# Patient Record
Sex: FEMALE | Race: WHITE | NOT HISPANIC OR LATINO | Employment: OTHER | URBAN - METROPOLITAN AREA
[De-identification: names, ages, dates, MRNs, and addresses within clinical notes are randomized per-mention and may not be internally consistent; named-entity substitution may affect disease eponyms.]

---

## 2017-05-18 PROBLEM — N20.0 KIDNEY STONES: Status: ACTIVE | Noted: 2017-05-18

## 2017-09-13 ENCOUNTER — OFFICE VISIT (OUTPATIENT)
Dept: INTERNAL MEDICINE | Facility: CLINIC | Age: 62
End: 2017-09-13

## 2017-09-13 VITALS
SYSTOLIC BLOOD PRESSURE: 108 MMHG | DIASTOLIC BLOOD PRESSURE: 64 MMHG | HEIGHT: 66 IN | OXYGEN SATURATION: 97 % | BODY MASS INDEX: 24.08 KG/M2 | TEMPERATURE: 98.1 F | HEART RATE: 63 BPM | WEIGHT: 149.8 LBS

## 2017-09-13 DIAGNOSIS — M79.644 CHRONIC THUMB PAIN, BILATERAL: ICD-10-CM

## 2017-09-13 DIAGNOSIS — Z00.00 ANNUAL PHYSICAL EXAM: Primary | ICD-10-CM

## 2017-09-13 DIAGNOSIS — R73.09 ABNORMAL GLUCOSE: ICD-10-CM

## 2017-09-13 DIAGNOSIS — E61.1 IRON DEFICIENCY: ICD-10-CM

## 2017-09-13 DIAGNOSIS — Z12.11 SCREENING FOR COLON CANCER: ICD-10-CM

## 2017-09-13 DIAGNOSIS — R53.83 OTHER FATIGUE: ICD-10-CM

## 2017-09-13 DIAGNOSIS — E78.00 PURE HYPERCHOLESTEROLEMIA: ICD-10-CM

## 2017-09-13 DIAGNOSIS — G89.29 CHRONIC THUMB PAIN, BILATERAL: ICD-10-CM

## 2017-09-13 DIAGNOSIS — N84.1 ENDOCERVICAL POLYP: ICD-10-CM

## 2017-09-13 DIAGNOSIS — R82.90 ABNORMAL URINALYSIS: ICD-10-CM

## 2017-09-13 DIAGNOSIS — M79.645 CHRONIC THUMB PAIN, BILATERAL: ICD-10-CM

## 2017-09-13 DIAGNOSIS — Z01.411 ENCOUNTER FOR GYNECOLOGICAL EXAMINATION WITH ABNORMAL FINDING: ICD-10-CM

## 2017-09-13 PROBLEM — E78.5 HYPERLIPIDEMIA: Status: ACTIVE | Noted: 2017-09-13

## 2017-09-13 PROBLEM — Z85.3 HISTORY OF CANCER OF LEFT BREAST: Status: ACTIVE | Noted: 2017-09-13

## 2017-09-13 LAB
ALBUMIN SERPL-MCNC: 4.3 G/DL (ref 3.2–4.8)
ALBUMIN/GLOB SERPL: 1.6 G/DL (ref 1.5–2.5)
ALP SERPL-CCNC: 86 U/L (ref 25–100)
ALT SERPL-CCNC: 17 U/L (ref 7–40)
AST SERPL-CCNC: 21 U/L (ref 0–33)
BASOPHILS # BLD AUTO: 0.01 10*3/MM3 (ref 0–0.2)
BASOPHILS NFR BLD AUTO: 0.2 % (ref 0–1)
BILIRUB BLD-MCNC: NEGATIVE MG/DL
BILIRUB SERPL-MCNC: 0.8 MG/DL (ref 0.3–1.2)
BUN SERPL-MCNC: 17 MG/DL (ref 9–23)
BUN/CREAT SERPL: 24.3 (ref 7–25)
CALCIUM SERPL-MCNC: 8.9 MG/DL (ref 8.7–10.4)
CHLORIDE SERPL-SCNC: 103 MMOL/L (ref 99–109)
CHOLEST SERPL-MCNC: 293 MG/DL (ref 0–200)
CLARITY, POC: CLEAR
CO2 SERPL-SCNC: 30 MMOL/L (ref 20–31)
COLOR UR: YELLOW
CREAT SERPL-MCNC: 0.7 MG/DL (ref 0.6–1.3)
DEVELOPER EXPIRATION DATE: NORMAL
DEVELOPER LOT NUMBER: NORMAL
EOSINOPHIL # BLD AUTO: 0.04 10*3/MM3 (ref 0–0.3)
EOSINOPHIL NFR BLD AUTO: 1 % (ref 0–3)
ERYTHROCYTE [DISTWIDTH] IN BLOOD BY AUTOMATED COUNT: 13.2 % (ref 11.3–14.5)
EXPIRATION DATE: NORMAL
FECAL OCCULT BLOOD SCREEN, POC: NEGATIVE
FOLATE SERPL-MCNC: 20.76 NG/ML (ref 3.2–20)
GLOBULIN SER CALC-MCNC: 2.7 GM/DL
GLUCOSE SERPL-MCNC: 92 MG/DL (ref 70–100)
GLUCOSE UR STRIP-MCNC: NEGATIVE MG/DL
HBA1C MFR BLD: 5.3 % (ref 4.8–5.6)
HCT VFR BLD AUTO: 43.1 % (ref 34.5–44)
HDLC SERPL-MCNC: 90 MG/DL (ref 40–60)
HGB BLD-MCNC: 13.9 G/DL (ref 11.5–15.5)
IMM GRANULOCYTES # BLD: 0 10*3/MM3 (ref 0–0.03)
IMM GRANULOCYTES NFR BLD: 0 % (ref 0–0.6)
KETONES UR QL: NEGATIVE
LDLC SERPL CALC-MCNC: 190 MG/DL (ref 0–100)
LEUKOCYTE EST, POC: ABNORMAL
LYMPHOCYTES # BLD AUTO: 0.86 10*3/MM3 (ref 0.6–4.8)
LYMPHOCYTES NFR BLD AUTO: 21 % (ref 24–44)
Lab: NORMAL
MCH RBC QN AUTO: 32 PG (ref 27–31)
MCHC RBC AUTO-ENTMCNC: 32.3 G/DL (ref 32–36)
MCV RBC AUTO: 99.1 FL (ref 80–99)
MONOCYTES # BLD AUTO: 0.31 10*3/MM3 (ref 0–1)
MONOCYTES NFR BLD AUTO: 7.6 % (ref 0–12)
NEGATIVE CONTROL: NEGATIVE
NEUTROPHILS # BLD AUTO: 2.87 10*3/MM3 (ref 1.5–8.3)
NEUTROPHILS NFR BLD AUTO: 70.2 % (ref 41–71)
NITRITE UR-MCNC: NEGATIVE MG/ML
PH UR: 6 [PH] (ref 5–8)
PLATELET # BLD AUTO: 214 10*3/MM3 (ref 150–450)
POSITIVE CONTROL: POSITIVE
POTASSIUM SERPL-SCNC: 4 MMOL/L (ref 3.5–5.5)
PROT SERPL-MCNC: 7 G/DL (ref 5.7–8.2)
PROT UR STRIP-MCNC: NEGATIVE MG/DL
RBC # BLD AUTO: 4.35 10*6/MM3 (ref 3.89–5.14)
RBC # UR STRIP: NEGATIVE /UL
SODIUM SERPL-SCNC: 138 MMOL/L (ref 132–146)
SP GR UR: 1.03 (ref 1–1.03)
T4 FREE SERPL-MCNC: 1.26 NG/DL (ref 0.89–1.76)
TRIGL SERPL-MCNC: 67 MG/DL (ref 0–150)
TSH SERPL DL<=0.005 MIU/L-ACNC: 1.97 MIU/ML (ref 0.35–5.35)
UROBILINOGEN UR QL: NORMAL
VIT B12 SERPL-MCNC: 274 PG/ML (ref 211–911)
VLDLC SERPL CALC-MCNC: 13.4 MG/DL
WBC # BLD AUTO: 4.09 10*3/MM3 (ref 3.5–10.8)

## 2017-09-13 PROCEDURE — 81003 URINALYSIS AUTO W/O SCOPE: CPT | Performed by: FAMILY MEDICINE

## 2017-09-13 PROCEDURE — 82270 OCCULT BLOOD FECES: CPT | Performed by: FAMILY MEDICINE

## 2017-09-13 PROCEDURE — 99396 PREV VISIT EST AGE 40-64: CPT | Performed by: FAMILY MEDICINE

## 2017-09-13 RX ORDER — EXEMESTANE 25 MG/1
1 TABLET ORAL DAILY
COMMUNITY
Start: 2017-08-16 | End: 2023-03-13 | Stop reason: ALTCHOICE

## 2017-09-13 RX ORDER — CLOBETASOL PROPIONATE 0.5 MG/G
CREAM TOPICAL AS NEEDED
COMMUNITY
Start: 2017-07-19 | End: 2019-03-12

## 2017-09-13 RX ORDER — TRIAMCINOLONE ACETONIDE 1 MG/G
CREAM TOPICAL AS NEEDED
COMMUNITY
Start: 2017-07-19 | End: 2020-07-09

## 2017-09-13 RX ORDER — NYSTATIN 100000 U/G
1 CREAM TOPICAL AS NEEDED
COMMUNITY
Start: 2017-07-19

## 2017-09-13 NOTE — PROGRESS NOTES
"Subjective   Kaitlynn Le is a 62 y.o. female.     Chief Complaint   Patient presents with   • Annual Exam   • sleep issues     patient concerned she is not getting enough REM sleep   • Arthritis     patient c/o possible arthritis in her right thumb   • Hearing Loss       Visit Vitals   • /64   • Pulse 63   • Temp 98.1 °F (36.7 °C)   • Ht 65.7\" (166.9 cm)   • Wt 149 lb 12.8 oz (67.9 kg)   • SpO2 97%   • BMI 24.4 kg/m2       Gynecologic Exam   The patient's pertinent negatives include no genital itching, genital lesions, genital odor, genital rash, missed menses, pelvic pain, vaginal bleeding or vaginal discharge. The patient is experiencing no pain. She is not pregnant. Associated symptoms include joint pain. Pertinent negatives include no abdominal pain, anorexia, back pain, chills, constipation, diarrhea, discolored urine, dysuria, fever, flank pain, frequency, headaches, hematuria, joint swelling, nausea, painful intercourse, rash, sore throat, urgency or vomiting. She is sexually active. No, her partner does not have an STD. She is postmenopausal. There is no history of an abdominal surgery, a  section, an ectopic pregnancy, endometriosis, a gynecological surgery, herpes simplex, menorrhagia, metrorrhagia, miscarriage, ovarian cysts, perineal abscess, PID, an STD, a terminated pregnancy or vaginosis.   Arthritis   Presents for initial visit. The disease course has been worsening. She complains of pain and stiffness. She reports no joint swelling or joint warmth. Affected location: base of thumbs bilaterally. Her pain is at a severity of 6/10. Associated symptoms include fatigue. Pertinent negatives include no diarrhea, dry eyes, dry mouth, dysuria, fever, pain at night, pain while resting, rash, Raynaud's syndrome, uveitis or weight loss. There is no history of chronic back pain, lupus, osteoarthritis, psoriasis or rheumatoid arthritis.   Her pertinent risk factors include overuse. Risk factors do " not include scoliosis. She shows no family history of chronic back pain, family history of lupus, family history of osteoarthritis, family history of psoriasis, family history of rheumatoid arthritis or family history of unspecified arthritis. Past treatments include rest. The treatment provided significant relief. Factors aggravating her arthritis include gripping and lifting. Compliance with prior treatments has been good.      Pt is here for Physical.   Pt has had fatigue and is waking 20 minutes after falling asleep. Pt's  told her that she snores.     Pt has pain in the base of both thumbs. Pt plays the piano.     Pt has noticed decreased hearing. Pt has been seen in past with ENT about 2 years ago and had ears cleaned.  Pt does get vertigo.    The following portions of the patient's history were reviewed and updated as appropriate: allergies, current medications, past family history, past medical history, past social history, past surgical history and problem list.    Past Medical History:   Diagnosis Date   • Kidney stones 04/13/2017    bilateral       Past Surgical History:   Procedure Laterality Date   • BREAST RECONSTRUCTION Bilateral 2014   • CYSTOSCOPY  04/13/2017    right ureteral stone Dr Lofton   • CYSTOSCOPY  12/07/2009   • CYSTOSCOPY INSERTION / REMOVAL STENT / STONE  05/01/2009    calcium oxxylate mono   • EXTRACORPOREAL SHOCK WAVE LITHOTRIPSY (ESWL) Left 03/2009   • KNEE ARTHROSCOPY W/ MENISCAL REPAIR Right 2011   • MASTECTOMY MODIFIED RADICAL / SIMPLE / COMPLETE Left 2013    breast cancer   • SIMPLE MASTECTOMY Right 2013    hx breast Ca left   • SKIN BIOPSY  07/2017    mid thoracic back Dr Salinas   • TONSILLECTOMY  09/2015   • WISDOM TOOTH EXTRACTION  2010       Allergies   Allergen Reactions   • Ibuprofen GI Intolerance   • Adhesive Tape Rash       Family History   Problem Relation Age of Onset   • Breast cancer Mother    • Mitral valve prolapse Mother    • Heart failure Mother    •  Mitral valve prolapse Father    • Migraines Sister    • Mitral valve prolapse Brother        Review of Systems   Constitutional: Positive for fatigue. Negative for activity change, appetite change, chills, diaphoresis, fever, unexpected weight change and weight loss.   HENT: Positive for hearing loss and tinnitus. Negative for congestion, dental problem, drooling, ear discharge, ear pain, facial swelling, mouth sores, nosebleeds, postnasal drip, rhinorrhea, sinus pressure, sneezing, sore throat, trouble swallowing and voice change.    Eyes: Negative.  Negative for photophobia, pain, discharge, redness, itching and visual disturbance.   Respiratory: Negative.  Negative for apnea, cough, choking, chest tightness, shortness of breath, wheezing and stridor.    Cardiovascular: Negative.  Negative for chest pain, palpitations and leg swelling.   Gastrointestinal: Negative.  Negative for abdominal distention, abdominal pain, anal bleeding, anorexia, blood in stool, constipation, diarrhea, nausea, rectal pain and vomiting.   Endocrine: Negative.  Negative for cold intolerance, heat intolerance, polydipsia, polyphagia and polyuria.   Genitourinary: Negative.  Negative for decreased urine volume, difficulty urinating, dyspareunia, dysuria, enuresis, flank pain, frequency, genital sores, hematuria, menorrhagia, menstrual problem, missed menses, pelvic pain, urgency, vaginal bleeding, vaginal discharge and vaginal pain.   Musculoskeletal: Positive for arthralgias, arthritis, joint pain, neck stiffness and stiffness. Negative for back pain, gait problem, joint swelling, myalgias and neck pain.        Bilateral thumb pain. Pt carries her stress in her neck.  Pt does not turn to the right as it will precipitate vertigo.   Skin: Negative.  Negative for color change, pallor, rash and wound.   Allergic/Immunologic: Negative.  Negative for environmental allergies, food allergies and immunocompromised state.   Neurological: Positive  for dizziness. Negative for tremors, seizures, syncope, facial asymmetry, speech difficulty, weakness, light-headedness, numbness and headaches.   Hematological: Negative.  Negative for adenopathy. Does not bruise/bleed easily.   Psychiatric/Behavioral: Positive for sleep disturbance. Negative for agitation, behavioral problems, confusion, decreased concentration, dysphoric mood, hallucinations, self-injury and suicidal ideas. The patient is not nervous/anxious and is not hyperactive.        Objective   Physical Exam   Constitutional: She is oriented to person, place, and time. She appears well-developed and well-nourished. No distress.   HENT:   Head: Normocephalic and atraumatic.   Right Ear: Tympanic membrane, external ear and ear canal normal.   Left Ear: Tympanic membrane, external ear and ear canal normal.   Nose: Nose normal.   Mouth/Throat: Oropharynx is clear and moist.   Eyes: Conjunctivae and EOM are normal. Pupils are equal, round, and reactive to light. Right eye exhibits no chemosis, no discharge, no exudate and no hordeolum. No foreign body present in the right eye. Left eye exhibits no chemosis, no discharge and no exudate. No foreign body present in the left eye. Right conjunctiva is not injected. Right conjunctiva has no hemorrhage. Left conjunctiva is not injected. Left conjunctiva has no hemorrhage. No scleral icterus. Right eye exhibits normal extraocular motion and no nystagmus. Left eye exhibits normal extraocular motion and no nystagmus.   Neck: Trachea normal and normal range of motion. Neck supple. Carotid bruit is not present. No tracheal deviation present. No thyroid mass and no thyromegaly present.   Cardiovascular: Normal rate, regular rhythm, normal heart sounds and intact distal pulses.  Exam reveals no gallop and no friction rub.    No murmur heard.  Pulses:       Dorsalis pedis pulses are 2+ on the right side, and 2+ on the left side.        Posterior tibial pulses are 2+ on the  right side, and 2+ on the left side.   Pulmonary/Chest: Effort normal and breath sounds normal. No respiratory distress. She has no decreased breath sounds. She has no wheezes. She has no rhonchi. She has no rales. Chest wall is not dull to percussion. She exhibits no mass and no tenderness. Right breast exhibits no skin change and no tenderness. Left breast exhibits no skin change and no tenderness.       Abdominal: Soft. Bowel sounds are normal. She exhibits no distension and no mass. There is no hepatosplenomegaly. There is no tenderness. There is no rebound and no guarding. Hernia confirmed negative in the right inguinal area and confirmed negative in the left inguinal area.   Genitourinary: Rectum normal. Rectal exam shows no external hemorrhoid, no internal hemorrhoid, no fissure, no mass, no tenderness, anal tone normal and guaiac negative stool. Pelvic exam was performed with patient supine. There is no rash, tenderness, lesion or injury on the right labia. There is no rash, tenderness, lesion or injury on the left labia. Uterus is not deviated, not enlarged, not fixed and not tender. Cervix exhibits no motion tenderness, no discharge and no friability. Right adnexum displays no mass, no tenderness and no fullness. Left adnexum displays no mass, no tenderness and no fullness. No erythema, tenderness or bleeding in the vagina. No foreign body in the vagina. No vaginal discharge found.       Musculoskeletal: Normal range of motion. She exhibits no edema, tenderness or deformity.        Hands:  Lymphadenopathy:        Head (right side): No submandibular adenopathy present.        Head (left side): No submandibular adenopathy present.     She has no cervical adenopathy.        Right cervical: No superficial cervical, no deep cervical and no posterior cervical adenopathy present.       Left cervical: No superficial cervical, no deep cervical and no posterior cervical adenopathy present.     She has no axillary  adenopathy.        Right axillary: No pectoral and no lateral adenopathy present.        Left axillary: No pectoral and no lateral adenopathy present.       Right: No inguinal and no supraclavicular adenopathy present.        Left: No inguinal and no supraclavicular adenopathy present.   Neurological: She is alert and oriented to person, place, and time. She has normal strength and normal reflexes. No cranial nerve deficit or sensory deficit. Coordination normal.   Reflex Scores:       Bicep reflexes are 2+ on the right side and 2+ on the left side.       Brachioradialis reflexes are 2+ on the right side and 2+ on the left side.       Patellar reflexes are 2+ on the right side and 2+ on the left side.  Skin: Skin is warm and dry. No rash noted. She is not diaphoretic. No cyanosis. Nails show no clubbing.   Psychiatric: She has a normal mood and affect. Her speech is normal and behavior is normal. Judgment and thought content normal. Cognition and memory are normal.   Nursing note and vitals reviewed.      Assessment/Plan   Kaitlynn was seen today for annual exam, sleep issues, arthritis and hearing loss.    Diagnoses and all orders for this visit:    Annual physical exam  -     POCT urinalysis dipstick, automated  -     Comprehensive Metabolic Panel  -     CBC & Differential  -     TSH  -     Lipid Panel  -     POCT occult blood x 1 stool    Abnormal glucose  -     Hemoglobin A1c    Abnormal urinalysis  -     Urine Culture    Iron deficiency    Other fatigue  -     Vitamin B12  -     Folate  -     T4, Free  -     Ambulatory Referral to Sleep Medicine    Encounter for gynecological examination with abnormal finding  -     Liquid-based Pap Smear, Screening; Future    Endocervical polyp  -     Liquid-based Pap Smear, Screening; Future  -     Ambulatory Referral to Gynecology    Chronic thumb pain, bilateral  -     Ambulatory Referral to Orthopedic Surgery  -     XR hand 3+ vw bilateral; Future    Pure  hypercholesterolemia    Screening for colon cancer  -     Ambulatory Referral For Screening Colonoscopy    pt [facility]    Healthy diet and regular exercise           Current Outpatient Prescriptions:   •  Calcium Citrate-Vitamin D (CALCIUM CITRATE + D PO), Take  by mouth., Disp: , Rfl:   •  clobetasol (TEMOVATE) 0.05 % cream, As Needed., Disp: , Rfl:   •  exemestane (AROMASIN) 25 MG chemo tablet, 1 tablet Daily., Disp: , Rfl:   •  metroNIDAZOLE (METROCREAM) 0.75 % cream, As Needed., Disp: , Rfl:   •  nystatin (MYCOSTATIN) 111240 UNIT/GM cream, As Needed., Disp: , Rfl:   •  triamcinolone (KENALOG) 0.1 % cream, As Needed., Disp: , Rfl:     Return in about 6 months (around 3/13/2018), or if symptoms worsen or fail to improve, for Recheck.    Recent Results (from the past 168 hour(s))   POCT urinalysis dipstick, automated    Collection Time: 09/13/17  8:51 AM   Result Value Ref Range    Color Yellow Yellow, Straw, Dark Yellow, Candice    Clarity, UA Clear Clear    Glucose, UA Negative Negative, 1000 mg/dL (3+) mg/dL    Bilirubin Negative Negative    Ketones, UA Negative Negative    Specific Gravity  1.030 1.005 - 1.030    Blood, UA Negative Negative    pH, Urine 6.0 5.0 - 8.0    Protein, POC Negative Negative mg/dL    Urobilinogen, UA Normal Normal    Leukocytes Small (1+) (A) Negative    Nitrite, UA Negative Negative   Comprehensive Metabolic Panel    Collection Time: 09/13/17  9:45 AM   Result Value Ref Range    Glucose 92 70 - 100 mg/dL    BUN 17 9 - 23 mg/dL    Creatinine 0.70 0.60 - 1.30 mg/dL    eGFR Non African Am 85 >60 mL/min/1.73    eGFR African Am 103 >60 mL/min/1.73    BUN/Creatinine Ratio 24.3 7.0 - 25.0    Sodium 138 132 - 146 mmol/L    Potassium 4.0 3.5 - 5.5 mmol/L    Chloride 103 99 - 109 mmol/L    Total CO2 30.0 20.0 - 31.0 mmol/L    Calcium 8.9 8.7 - 10.4 mg/dL    Total Protein 7.0 5.7 - 8.2 g/dL    Albumin 4.30 3.20 - 4.80 g/dL    Globulin 2.7 gm/dL    A/G Ratio 1.6 1.5 - 2.5 g/dL     Total Bilirubin 0.8 0.3 - 1.2 mg/dL    Alkaline Phosphatase 86 25 - 100 U/L    AST (SGOT) 21 0 - 33 U/L    ALT (SGPT) 17 7 - 40 U/L   CBC & Differential    Collection Time: 09/13/17  9:45 AM   Result Value Ref Range    WBC 4.09 3.50 - 10.80 10*3/mm3    RBC 4.35 3.89 - 5.14 10*6/mm3    Hemoglobin 13.9 11.5 - 15.5 g/dL    Hematocrit 43.1 34.5 - 44.0 %    MCV 99.1 (H) 80.0 - 99.0 fL    MCH 32.0 (H) 27.0 - 31.0 pg    MCHC 32.3 32.0 - 36.0 g/dL    RDW 13.2 11.3 - 14.5 %    Platelets 214 150 - 450 10*3/mm3    Neutrophil Rel % 70.2 41.0 - 71.0 %    Lymphocyte Rel % 21.0 (L) 24.0 - 44.0 %    Monocyte Rel % 7.6 0.0 - 12.0 %    Eosinophil Rel % 1.0 0.0 - 3.0 %    Basophil Rel % 0.2 0.0 - 1.0 %    Neutrophils Absolute 2.87 1.50 - 8.30 10*3/mm3    Lymphocytes Absolute 0.86 0.60 - 4.80 10*3/mm3    Monocytes Absolute 0.31 0.00 - 1.00 10*3/mm3    Eosinophils Absolute 0.04 0.00 - 0.30 10*3/mm3    Basophils Absolute 0.01 0.00 - 0.20 10*3/mm3    Immature Granulocyte Rel % 0.0 0.0 - 0.6 %    Immature Grans Absolute 0.00 0.00 - 0.03 10*3/mm3   TSH    Collection Time: 09/13/17  9:45 AM   Result Value Ref Range    TSH 1.971 0.350 - 5.350 mIU/mL   Hemoglobin A1c    Collection Time: 09/13/17  9:45 AM   Result Value Ref Range    Hemoglobin A1C 5.30 4.80 - 5.60 %   Lipid Panel    Collection Time: 09/13/17  9:45 AM   Result Value Ref Range    Total Cholesterol 293 (H) 0 - 200 mg/dL    Triglycerides 67 0 - 150 mg/dL    HDL Cholesterol 90 (H) 40 - 60 mg/dL    VLDL Cholesterol 13.4 mg/dL    LDL Cholesterol  190 (H) 0 - 100 mg/dL   Vitamin B12    Collection Time: 09/13/17  9:45 AM   Result Value Ref Range    Vitamin B-12 274 211 - 911 pg/mL   Folate    Collection Time: 09/13/17  9:45 AM   Result Value Ref Range    Folate 20.76 (H) 3.20 - 20.00 ng/mL   T4, Free    Collection Time: 09/13/17  9:45 AM   Result Value Ref Range    Free T4 1.26 0.89 - 1.76 ng/dL   POCT occult blood x 1 stool    Collection Time: 09/13/17 12:15 PM   Result Value Ref Range     Fecal Occult Blood Negative Negative    Lot Number [id number]     Expiration Date 06/30/2020     DEVELOPER LOT NUMBER [id number]     DEVELOPER EXPIRATION DATE 4/2018     Positive Control Positive Positive    Negative Control Negative Negative

## 2017-09-15 LAB
BACTERIA UR CULT: NORMAL
BACTERIA UR CULT: NORMAL

## 2017-09-18 ENCOUNTER — OFFICE VISIT (OUTPATIENT)
Dept: OBSTETRICS AND GYNECOLOGY | Facility: CLINIC | Age: 62
End: 2017-09-18

## 2017-09-18 VITALS
SYSTOLIC BLOOD PRESSURE: 110 MMHG | HEIGHT: 66 IN | DIASTOLIC BLOOD PRESSURE: 72 MMHG | WEIGHT: 150 LBS | BODY MASS INDEX: 24.11 KG/M2

## 2017-09-18 DIAGNOSIS — N84.1 ENDOCERVICAL POLYP: Primary | ICD-10-CM

## 2017-09-18 DIAGNOSIS — Z85.3 PERSONAL HISTORY OF BREAST CANCER: ICD-10-CM

## 2017-09-18 PROCEDURE — 57500 BIOPSY OF CERVIX: CPT | Performed by: OBSTETRICS & GYNECOLOGY

## 2017-09-18 PROCEDURE — 99203 OFFICE O/P NEW LOW 30 MIN: CPT | Performed by: OBSTETRICS & GYNECOLOGY

## 2017-09-18 NOTE — PROGRESS NOTES
Chief Complaint   Patient presents with   • Gynecologic Exam     referred by PCP due to endocervical polyp.       Kaitlynn Le is a 62 y.o. year old  presenting to be seeniIn consultation from Dr. Adela Valdez for evaluation of a cervical polyp.  This patient has a personal history of breast cancer with a previous modified radical mastectomy of the left breast, and a simple mastectomy of the right breast with breast reconstruction.  She currently takes Aromasin, 25 mg daily.  She has complaints of urinary urgency and vaginal irritation.  She was noted on examination by Dr. Valdez to have a cervical polyp.  She denies postmenopausal bleeding.    History   Sexual Activity   • Sexual activity: Yes   • Partners: Male   • Birth control/ protection: Post-menopausal     Comment:      SCREENING TESTS    Year 2012   Age                         PAP      Neg.                   HPV high risk                         Mammogram                         CONNIE score                         Breast MRI                         Lipids                         Vitamin D                         Colonoscopy                         DEXA  Frax (hip/any)                         Ovarian Screen                             No Additional Complaints Reported    The following portions of the patient's history were reviewed and updated as appropriate:vital signs and   She  does not have any pertinent problems on file.  She  has a past surgical history that includes Cystoscopy (2017); Cystoscopy insertion / removal stent / stone (2009); Extracorporeal shock wave lithotripsy (Left, 2009); Cystoscopy (2009); Simple mastectomy (Right, ); Mastectomy modified radical / simple / complete (Left, ); Breast reconstruction (Bilateral, ); Tonsillectomy (2015); Knee arthroscopy w/ meniscal repair (Right,  "2011); [place] tooth extraction (2010); and Skin biopsy (07/2017).  Her family history includes Breast cancer in her mother; Heart failure in her mother; Migraines in her sister; Mitral valve prolapse in her brother, father, and mother.  She  reports that she has never smoked. She has never used smokeless tobacco. She reports that she does not drink alcohol or use illicit drugs.  Current Outpatient Prescriptions   Medication Sig Dispense Refill   • Calcium Citrate-Vitamin D (CALCIUM CITRATE + D PO) Take  by mouth.     • clobetasol (TEMOVATE) 0.05 % cream As Needed.     • exemestane (AROMASIN) 25 MG chemo tablet 1 tablet Daily.     • metroNIDAZOLE (METROCREAM) 0.75 % cream As Needed.     • nystatin (MYCOSTATIN) 482996 UNIT/GM cream As Needed.     • triamcinolone (KENALOG) 0.1 % cream As Needed.       No current facility-administered medications for this visit.      She is allergic to ibuprofen and adhesive tape..        Review of Systems  A comprehensive review of systems was done.  Constitutional: negative for fever, chills, activity change, appetite change, fatigue and unexpected weight change.  Respiratory: negative  Cardiovascular: negative  Gastrointestinal: negative  Genitourinary:positive for urgency  Musculoskeletal:negative  Behavioral/Psych: negative          /72  Ht 65.7\" (166.9 cm)  Wt 150 lb (68 kg)  BMI 24.43 kg/m2    Physical Exam    General:  alert; cooperative; well developed; well nourished   Skin:  No suspicious lesions seen   Thyroid: normal to inspection and palpation   Lungs:  clear to auscultation bilaterally   Heart:  regular rate and rhythm, S1, S2 normal, no murmur, click, rub or gallop   Breasts:  Not performed.   Abdomen: soft, non-tender; no masses  no umbilical or inginual hernias are present  no hepato-splenomegaly   Pelvis: Clinical staff was present for exam  External genitalia:  normal appearance of the external genitalia including Bartholin's and [place]'s glands.  Vaginal:  " atrophic mucosal changes are present;  Cervix:  erythematous polyp present  Uterus:  normal size, shape and consistency. anteverted;  Adnexa:  non palpable bilaterally.     Lab Review   No data reviewed    Imaging   No data reviewed    Medical counseling was given to patient for the following topics: diagnostic results, instructions for management, impressions and risks and benefits of treatment options . Total time of the encounter was 35 minute(s) and 17 minute(s)  was spent in face to face counseling.  I have counseled the patient that her urinary urgency and vaginal irritation is likely due to vaginal atrophy.  Since she is on an estrogen receptor modulator, and I counseled her to use Replens vaginally and on the urethra daily.  I have counseled the patient that she has an endocervical polyp.  I counseled her that we need to rule out an endometrial polyp as well.  I have counseled her to have a pelvic ultrasound and then we will determine her method of management; she is agreeable to this.    In the exam room the patient was placed in lithotomy position.  A speculum was introduced.  The endocervical polyp was grasped with ring forceps and was twisted from its base.  Silver nitrate was placed at the base of the polyp.  The specimen was sent for pathology.  There were no complications.        ASSESSMENT  Problems Addressed this Visit     None      Visit Diagnoses     Endocervical polyp    -  Primary    Relevant Orders    US Non-ob Transvaginal    Personal history of breast cancer                PLAN    · Medications prescribed this encounter:  No orders of the defined types were placed in this encounter.  · Pelvic ultrasound scan- The patient's pelvic ultrasound reveals a normal size anteverted uterus is ring 47.6 cm³.  The endometrial stripe is only 1.5 mm.  There is a small intramural uterine leiomyoma-ring on centimeter in size.  The ovaries are not visualized.  · I have discussed the findings with the patient  and indicated that we should be able to do an endocervical polypectomy in the office without difficulty.  She agrees to this.  I have informed her that there is no evidence of an endometrial polyp.  I have informed her that she may spot for 3-5 days.  I have informed her of the risks of surgery including bleeding, infection, and pain.  Informed consent was signed. Endocervical polypectomy done.  · Use Replens daily- intravaginally  · Follow up: Per Pelvic ultrasound results  · Calcium, 600 mg/ Vit. D, 400 IU daily     *Please note that portions of this documentation may have been completed with a voice recognition program.  Efforts were made to edit this dictation, but occasional words may have been mistranscribed.     This note was electronically signed.    CHASE Pinzon MD  September 18, 2017  1:34 PM

## 2017-09-18 NOTE — PROGRESS NOTES
Please call the patient regarding her abnormal result. LDL is high, need low animal fat, low sugar diet.  Need to consider adding statins, low if she is following diet.   B12 is low, add otc B12 1000 mcg per day.

## 2017-09-19 ENCOUNTER — TELEPHONE (OUTPATIENT)
Dept: INTERNAL MEDICINE | Facility: CLINIC | Age: 62
End: 2017-09-19

## 2017-09-19 NOTE — TELEPHONE ENCOUNTER
----- Message from Adela LIN MD sent at 9/17/2017 11:16 PM EDT -----  Please call the patient regarding her abnormal result. LDL is high, need low animal fat, low sugar diet.  Need to consider adding statins, low if she is following diet.   B12 is low, add otc B12 1000 mcg per day.

## 2017-09-19 NOTE — TELEPHONE ENCOUNTER
Called and notified patient of results. She is going to tighten diet and has already added B12 on.

## 2017-09-22 DIAGNOSIS — R87.610 ASCUS OF CERVIX WITH NEGATIVE HIGH RISK HPV: Primary | ICD-10-CM

## 2017-10-24 DIAGNOSIS — H91.90 HEARING PROBLEM, UNSPECIFIED LATERALITY: Primary | ICD-10-CM

## 2017-10-26 ENCOUNTER — OFFICE VISIT (OUTPATIENT)
Dept: ORTHOPEDIC SURGERY | Facility: CLINIC | Age: 62
End: 2017-10-26

## 2017-10-26 VITALS
WEIGHT: 146 LBS | SYSTOLIC BLOOD PRESSURE: 139 MMHG | HEART RATE: 84 BPM | DIASTOLIC BLOOD PRESSURE: 63 MMHG | BODY MASS INDEX: 22.13 KG/M2 | HEIGHT: 68 IN

## 2017-10-26 DIAGNOSIS — R52 PAIN: ICD-10-CM

## 2017-10-26 DIAGNOSIS — M18.0 PRIMARY OSTEOARTHRITIS OF BOTH FIRST CARPOMETACARPAL JOINTS: Primary | ICD-10-CM

## 2017-10-26 DIAGNOSIS — G56.01 CARPAL TUNNEL SYNDROME OF RIGHT WRIST: ICD-10-CM

## 2017-10-26 PROCEDURE — 99203 OFFICE O/P NEW LOW 30 MIN: CPT | Performed by: ORTHOPAEDIC SURGERY

## 2017-10-26 RX ORDER — MELOXICAM 7.5 MG/1
TABLET ORAL
Qty: 90 TABLET | Refills: 2 | Status: SHIPPED | OUTPATIENT
Start: 2017-10-26 | End: 2018-07-09

## 2017-10-26 NOTE — PROGRESS NOTES
[facility] Note    Subjective     Chief Complaint   Patient presents with   • Right Thumb - Pain     Right thumb pain x 4 months, pain scale a 9 but not constant pain, pain mostly when using the twisting motion or grabbing a hold of something and when writing. Pain decreases at rest.   • Left Thumb - Pain     Left thumb pain x 4 months, pain scale a 9 but not constant pain, pain mostly when using the twisting motion or grabbing a hold of something. Pain decreases at rest.          HPI    Kaitlynn Le is a 62 y.o. female. Patient is right-hand dominant and has had difficulty with her thumbs for the last 6 months.  She did a lot of gardening and she was posting a wedding in her backyard over the summer.  Her right side is much more affected than her left.  She has no obvious complaints of numbness or tingling.  She has throbbing pain in her thumbs.  She has trouble pinching and gripping and opening jars.  She's had no treatment thus far.     Past Medical History:   Diagnosis Date   • Cancer    • History of radiation therapy    • Hyperlipidemia    • Kidney stones 04/13/2017    bilateral   • Osteoarthritis of knee    • Osteoporosis    • Pes planus, flexible    • Tibialis posterior tendinitis    • Varicose veins of legs       Past Surgical History:   Procedure Laterality Date   • BREAST RECONSTRUCTION Bilateral 2014   • CYSTOSCOPY  04/13/2017    right ureteral stone Dr Lofton   • CYSTOSCOPY  12/07/2009   • CYSTOSCOPY INSERTION / REMOVAL STENT / STONE  05/01/2009    calcium oxxylate mono   • EXTRACORPOREAL SHOCK WAVE LITHOTRIPSY (ESWL) Left 03/2009   • KNEE ARTHROSCOPY W/ MENISCAL REPAIR Right 2011   • MASTECTOMY MODIFIED RADICAL / SIMPLE / COMPLETE Left 2013    breast cancer   • SIMPLE MASTECTOMY Right 2013    hx breast Ca left   • SKIN BIOPSY  07/2017    mid thoracic back Dr Salinas   • TONSILLECTOMY  09/2015   • WISDOM TOOTH EXTRACTION  2010      Family History   Problem Relation Age of Onset    • Breast cancer Mother    • Mitral valve prolapse Mother    • Heart failure Mother    • Heart disease Mother    • Mitral valve prolapse Father    • Heart disease Father    • Cancer Father    • Migraines Sister    • Mitral valve prolapse Brother    • Cancer Other      Social History     Social History   • Marital status:      Spouse name: N/A   • Number of children: N/A   • Years of education: N/A     Occupational History   • Not on file.     Social History Main Topics   • Smoking status: Never Smoker   • Smokeless tobacco: Never Used   • Alcohol use No   • Drug use: No   • Sexual activity: Yes     Partners: Male     Birth control/ protection: Post-menopausal      Comment:      Other Topics Concern   • Not on file     Social History Narrative      Current Outpatient Prescriptions on File Prior to Visit   Medication Sig Dispense Refill   • Calcium Citrate-Vitamin D (CALCIUM CITRATE + D PO) Take  by mouth.     • clobetasol (TEMOVATE) 0.05 % cream As Needed.     • exemestane (AROMASIN) 25 MG chemo tablet 1 tablet Daily.     • metroNIDAZOLE (METROCREAM) 0.75 % cream As Needed.     • nystatin (MYCOSTATIN) 328394 UNIT/GM cream As Needed.     • triamcinolone (KENALOG) 0.1 % cream As Needed.       No current facility-administered medications on file prior to visit.       Allergies   Allergen Reactions   • Ibuprofen GI Intolerance   • Latex    • Adhesive Tape Rash        The following portions of the patient's history were reviewed and updated as appropriate: allergies, current medications, past family history, past medical history, past social history, past surgical history and problem list.    Review of Systems   Constitutional: Negative.    HENT: Positive for hearing loss.    Eyes: Negative.    Respiratory: Negative.    Cardiovascular: Negative.    Gastrointestinal: Negative.    Endocrine: Negative.    Genitourinary: Negative.    Musculoskeletal: Positive for arthralgias.   Skin: Negative.   "  Allergic/Immunologic: Positive for environmental allergies.        Caffeine causes headaches and her heart to race   Neurological: Negative.    Hematological: Negative.    Psychiatric/Behavioral: Negative.         Objective      Physical Exam  /63  Pulse 84  Ht 67.52\" (171.5 cm)  Wt 146 lb (66.2 kg)  BMI 22.52 kg/m2    Body mass index is 22.52 kg/(m^2).    General  Mental Status - alert  General Appearance - cooperative, well groomed, not in acute distress  Orientation - Oriented X3  Build & Nutrition - well developed and well nourished  Posture - normal posture  Gait - normal gait     Integumentary  Global Assessment  Examination of related systems reveals - no lymphadenopathy  General Characteristics  Overall examination of the patient's skin reveals - no rashes, no evidence of scars, no suspicious lesions and no bruises.  Color - normal coloration of skin.    Ortho Exam  Peripheral Vascular   Bilateral Upper Extremity    No cyanotic nail beds    Pink nail beds and rapid capillary refill   Palpation    Radial Pulse - Bilaterally normal    Neurologic   Sensory: Light touch intact- Right and left hand    Left Upper Extremity    Left wrist extensors: 5/5    Left wrist flexors: 5/5    Left intrinsics: 5/5      Right Upper Extremity    Right wrist extensors: 5/5    Right wrist flexors: 5/5    Right intrinsics: 5/5    Musculoskeletal   Left Elbow    Forearm supination: AROM - 90 degrees    Forearm pronation: AROM - 90 degrees   Right Elbow    Forearm supination: AROM - 90 degrees    Forearm pronation: AROM - 90 degrees     Inspection and Palpation   Right Wrist      Tenderness - none    Swelling - none    Crepitus - none    Muscle tone - no atrophy   Left Wrist    Tenderness - none    Swelling - none    Crepitus - none    Muscle tone - no atrophy     ROJM:   Left Wrist    Flexion: AROM - 90 degrees    Extension: AROM - 90 degrees   Right Wrist    Flexion: AROM - 90 degrees    Extension: AROM - 90 " degrees     Deformities, Malalignments, Discrepancies    None     Functional Testing   Right Wrist    Tinel's Sign negative    Phalen's Sign positive    Carpal Compression Test positive    Thenar wasting mild    APB 5 out of 5   Left Wrist    Tinel's Sign negative    Phalen's Sign negative    Carpal Compression Test negative    Thenar Wasting mild    APB 4+ out of 5       Strength and Tone    Right  strength: good    Left  strength: good     Hand Exam:  No obvious crepitance with CMC shuck and grind testing.  There is pain however.  There is some stiffness as well.  No hyper mobility of the MCP joints.    Imaging/Studies  X-rays of bilateral hands are taken this morning in the office and reviewed.  On her left side, she has CMC arthritis of moderate severity.  On the right side she has STT arthritis as well as CMC arthritis.    Assessment:  1. Primary osteoarthritis of both first carpometacarpal joints    2. Pain    3. Carpal tunnel syndrome of right wrist        Plan:  1. CMC arthritis bilaterally with right-sided STT arthritis--we will try an oral anti-inflammatory for 6 weeks.  Discontinue for GI discomfort.  We will also add a neoprene thumb spica splint for daytime use.  2. For the carpal tunnel syndrome, she would benefit from nighttime splinting.  3. Follow-up in 6 weeks and we will assess her symptoms.  She may be a good candidate for some nerve studies or other modalities to her thumbs.  4. Right side is much more affected than the left overall.      Medical Decision Making  Management Options : prescription/IM medicine  Data/Risk: radiology tests and independent visualization of imaging, lab tests, or EMG/NCV    Harsh Moreno MD  10/26/17  9:59 AM

## 2018-07-09 ENCOUNTER — CONSULT (OUTPATIENT)
Dept: SLEEP MEDICINE | Facility: HOSPITAL | Age: 63
End: 2018-07-09

## 2018-07-09 VITALS
OXYGEN SATURATION: 98 % | HEART RATE: 83 BPM | DIASTOLIC BLOOD PRESSURE: 79 MMHG | BODY MASS INDEX: 23.17 KG/M2 | WEIGHT: 147.6 LBS | SYSTOLIC BLOOD PRESSURE: 162 MMHG | HEIGHT: 67 IN

## 2018-07-09 DIAGNOSIS — R06.83 SNORING: Primary | ICD-10-CM

## 2018-07-09 DIAGNOSIS — M26.19 RETROGNATHIA: ICD-10-CM

## 2018-07-09 DIAGNOSIS — G47.33 OBSTRUCTIVE SLEEP APNEA, ADULT: ICD-10-CM

## 2018-07-09 DIAGNOSIS — F51.04 PSYCHOPHYSIOLOGICAL INSOMNIA: ICD-10-CM

## 2018-07-09 PROCEDURE — 99204 OFFICE O/P NEW MOD 45 MIN: CPT | Performed by: INTERNAL MEDICINE

## 2018-07-09 RX ORDER — ASPIRIN 81 MG/1
81 TABLET, CHEWABLE ORAL DAILY
COMMUNITY
End: 2021-03-19 | Stop reason: HOSPADM

## 2018-07-09 RX ORDER — CHOLECALCIFEROL (VITAMIN D3) 125 MCG
500 CAPSULE ORAL 3 TIMES WEEKLY
COMMUNITY

## 2018-07-09 NOTE — PATIENT INSTRUCTIONS
Insomnia  Insomnia is a sleep disorder that makes it difficult to fall asleep or to stay asleep. Insomnia can cause tiredness (fatigue), low energy, difficulty concentrating, mood swings, and poor performance at work or school.  There are three different ways to classify insomnia:  · Difficulty falling asleep.  · Difficulty staying asleep.  · Waking up too early in the morning.    Any type of insomnia can be long-term (chronic) or short-term (acute). Both are common. Short-term insomnia usually lasts for three months or less. Chronic insomnia occurs at least three times a week for longer than three months.  What are the causes?  Insomnia may be caused by another condition, situation, or substance, such as:  · Anxiety.  · Certain medicines.  · Gastroesophageal reflux disease (GERD) or other gastrointestinal conditions.  · Asthma or other breathing conditions.  · Restless legs syndrome, sleep apnea, or other sleep disorders.  · Chronic pain.  · Menopause. This may include hot flashes.  · Stroke.  · Abuse of alcohol, tobacco, or illegal drugs.  · Depression.  · Caffeine.  · Neurological disorders, such as Alzheimer disease.  · An overactive thyroid (hyperthyroidism).    The cause of insomnia may not be known.  What increases the risk?  Risk factors for insomnia include:  · Gender. Women are more commonly affected than men.  · Age. Insomnia is more common as you get older.  · Stress. This may involve your professional or personal life.  · Income. Insomnia is more common in people with lower income.  · Lack of exercise.  · Irregular work schedule or night shifts.  · Traveling between different time zones.    What are the signs or symptoms?  If you have insomnia, trouble falling asleep or trouble staying asleep is the main symptom. This may lead to other symptoms, such as:  · Feeling fatigued.  · Feeling nervous about going to sleep.  · Not feeling rested in the morning.  · Having trouble concentrating.  · Feeling  irritable, anxious, or depressed.    How is this treated?  Treatment for insomnia depends on the cause. If your insomnia is caused by an underlying condition, treatment will focus on addressing the condition. Treatment may also include:  · Medicines to help you sleep.  · Counseling or therapy.  · Lifestyle adjustments.    Follow these instructions at home:  · Take medicines only as directed by your health care provider.  · Keep regular sleeping and waking hours. Avoid naps.  · Keep a sleep diary to help you and your health care provider figure out what could be causing your insomnia. Include:  ? When you sleep.  ? When you wake up during the night.  ? How well you sleep.  ? How rested you feel the next day.  ? Any side effects of medicines you are taking.  ? What you eat and drink.  · Make your bedroom a comfortable place where it is easy to fall asleep:  ? Put up shades or special blackout curtains to block light from outside.  ? Use a white noise machine to block noise.  ? Keep the temperature cool.  · Exercise regularly as directed by your health care provider. Avoid exercising right before bedtime.  · Use relaxation techniques to manage stress. Ask your health care provider to suggest some techniques that may work well for you. These may include:  ? Breathing exercises.  ? Routines to release muscle tension.  ? Visualizing peaceful scenes.  · Cut back on alcohol, caffeinated beverages, and cigarettes, especially close to bedtime. These can disrupt your sleep.  · Do not overeat or eat spicy foods right before bedtime. This can lead to digestive discomfort that can make it hard for you to sleep.  · Limit screen use before bedtime. This includes:  ? Watching TV.  ? Using your smartphone, tablet, and computer.  · Stick to a routine. This can help you fall asleep faster. Try to do a quiet activity, brush your teeth, and go to bed at the same time each night.  · Get out of bed if you are still awake after 15 minutes  of trying to sleep. Keep the lights down, but try reading or doing a quiet activity. When you feel sleepy, go back to bed.  · Make sure that you drive carefully. Avoid driving if you feel very sleepy.  · Keep all follow-up appointments as directed by your health care provider. This is important.  Contact a health care provider if:  · You are tired throughout the day or have trouble in your daily routine due to sleepiness.  · You continue to have sleep problems or your sleep problems get worse.  Get help right away if:  · You have serious thoughts about hurting yourself or someone else.  This information is not intended to replace advice given to you by your health care provider. Make sure you discuss any questions you have with your health care provider.  Document Released: 12/15/2001 Document Revised: 05/19/2017 Document Reviewed: 09/18/2015  [employer] Interactive Patient Education © 2018 [employer]  Sleep Apnea  Sleep apnea is a condition in which breathing pauses or becomes shallow during sleep. Episodes of sleep apnea usually last 10 seconds or longer, and they may occur as many as 20 times an hour. Sleep apnea disrupts your sleep and keeps your body from getting the rest that it needs. This condition can increase your risk of certain health problems, including:  · Heart attack.  · Stroke.  · Obesity.  · Diabetes.  · Heart failure.  · Irregular heartbeat.    There are three kinds of sleep apnea:  · Obstructive sleep apnea. This kind is caused by a blocked or collapsed airway.  · Central sleep apnea. This kind happens when the part of the brain that controls breathing does not send the correct signals to the muscles that control breathing.  · Mixed sleep apnea. This is a combination of obstructive and central sleep apnea.    What are the causes?  The most common cause of this condition is a collapsed or blocked airway. An airway can collapse or become blocked if:  · Your throat muscles are abnormally  relaxed.  · Your tongue and tonsils are larger than normal.  · You are overweight.  · Your airway is smaller than normal.    What increases the risk?  This condition is more likely to develop in people who:  · Are overweight.  · Smoke.  · Have a smaller than normal airway.  · Are elderly.  · Are male.  · Drink alcohol.  · Take sedatives or tranquilizers.  · Have a family history of sleep apnea.    What are the signs or symptoms?  Symptoms of this condition include:  · Trouble staying asleep.  · Daytime sleepiness and tiredness.  · Irritability.  · Loud snoring.  · Morning headaches.  · Trouble concentrating.  · Forgetfulness.  · Decreased interest in sex.  · Unexplained sleepiness.  · Mood swings.  · Personality changes.  · Feelings of depression.  · Waking up often during the night to urinate.  · Dry mouth.  · Sore throat.    How is this diagnosed?  This condition may be diagnosed with:  · A medical history.  · A physical exam.  · A series of tests that are done while you are sleeping (sleep study). These tests are usually done in a sleep lab, but they may also be done at home.    How is this treated?  Treatment for this condition aims to restore normal breathing and to ease symptoms during sleep. It may involve managing health issues that can affect breathing, such as high blood pressure or obesity. Treatment may include:  · Sleeping on your side.  · Using a decongestant if you have nasal congestion.  · Avoiding the use of depressants, including alcohol, sedatives, and narcotics.  · Losing weight if you are overweight.  · Making changes to your diet.  · Quitting smoking.  · Using a device to open your airway while you sleep, such as:  ? An oral appliance. This is a custom-made mouthpiece that shifts your lower jaw forward.  ? A continuous positive airway pressure (CPAP) device. This device delivers oxygen to your airway through a mask.  ? A nasal expiratory positive airway pressure (EPAP) device. This device has  valves that you put into each nostril.  ? A bi-level positive airway pressure (BPAP) device. This device delivers oxygen to your airway through a mask.  · Surgery if other treatments do not work. During surgery, excess tissue is removed to create a wider airway.    It is important to get treatment for sleep apnea. Without treatment, this condition can lead to:  · High blood pressure.  · Coronary artery disease.  · (Men) An inability to achieve or maintain an erection (impotence).  · Reduced thinking abilities.    Follow these instructions at home:  · Make any lifestyle changes that your health care provider recommends.  · Eat a healthy, well-balanced diet.  · Take over-the-counter and prescription medicines only as told by your health care provider.  · Avoid using depressants, including alcohol, sedatives, and narcotics.  · Take steps to lose weight if you are overweight.  · If you were given a device to open your airway while you sleep, use it only as told by your health care provider.  · Do not use any tobacco products, such as cigarettes, chewing tobacco, and e-cigarettes. If you need help quitting, ask your health care provider.  · Keep all follow-up visits as told by your health care provider. This is important.  Contact a health care provider if:  · The device that you received to open your airway during sleep is uncomfortable or does not seem to be working.  · Your symptoms do not improve.  · Your symptoms get worse.  Get help right away if:  · You develop chest pain.  · You develop shortness of breath.  · You develop discomfort in your back, arms, or stomach.  · You have trouble speaking.  · You have weakness on one side of your body.  · You have drooping in your face.  These symptoms may represent a serious problem that is an emergency. Do not wait to see if the symptoms will go away. Get medical help right away. Call your local emergency services (911 in the [place] Do not drive yourself to the  [facility].  This information is not intended to replace advice given to you by your health care provider. Make sure you discuss any questions you have with your health care provider.  Document Released: 12/08/2003 Document Revised: 08/13/2017 Document Reviewed: 09/26/2016  [employer] Interactive Patient Education © 2018 [employer]
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
## 2018-07-16 ENCOUNTER — OFFICE VISIT (OUTPATIENT)
Dept: INTERNAL MEDICINE | Facility: CLINIC | Age: 63
End: 2018-07-16

## 2018-07-16 VITALS
OXYGEN SATURATION: 98 % | SYSTOLIC BLOOD PRESSURE: 128 MMHG | WEIGHT: 145 LBS | HEART RATE: 73 BPM | DIASTOLIC BLOOD PRESSURE: 84 MMHG | HEIGHT: 67 IN | BODY MASS INDEX: 22.76 KG/M2 | TEMPERATURE: 98 F

## 2018-07-16 DIAGNOSIS — H81.10 BENIGN PAROXYSMAL POSITIONAL VERTIGO, UNSPECIFIED LATERALITY: ICD-10-CM

## 2018-07-16 DIAGNOSIS — I10 BENIGN ESSENTIAL HTN: Primary | ICD-10-CM

## 2018-07-16 PROCEDURE — 99213 OFFICE O/P EST LOW 20 MIN: CPT | Performed by: NURSE PRACTITIONER

## 2018-07-16 RX ORDER — LISINOPRIL 2.5 MG/1
2.5 TABLET ORAL DAILY
Qty: 30 TABLET | Refills: 0 | Status: SHIPPED | OUTPATIENT
Start: 2018-07-16 | End: 2018-09-04

## 2018-07-16 NOTE — PROGRESS NOTES
Subjective   Kaitlynn Le is a 63 y.o. female.     Hypertension   This is a new problem. The current episode started 1 to 4 weeks ago. The problem is unchanged. The problem is uncontrolled. Pertinent negatives include no anxiety, blurred vision, chest pain, headaches, malaise/fatigue, neck pain, orthopnea, palpitations, peripheral edema, PND, shortness of breath or sweats. There are no associated agents to hypertension. Risk factors for coronary artery disease include dyslipidemia and sedentary lifestyle. Past treatments include nothing. Current antihypertension treatment includes nothing. There is no history of angina, CVA, left ventricular hypertrophy or PVD.      Pt reports recent BP readings of :   171/90 on Wednesday  140/90 on Friday   And yesterady was 158/78    Has been avoiding salt    Also Being treated for BPPV  With therapy/epley maneuver.     Has had elevation in BP in the past, but has never been treated  For HTN.       The following portions of the patient's history were reviewed and updated as appropriate: allergies, current medications, past family history, past medical history, past social history, past surgical history and problem list.    Review of Systems   Constitutional: Negative for fatigue, malaise/fatigue and unexpected weight change.   Eyes: Negative for blurred vision.   Respiratory: Negative for cough, chest tightness and shortness of breath.    Cardiovascular: Negative for chest pain, palpitations, orthopnea, leg swelling and PND.   Gastrointestinal: Negative for nausea.   Musculoskeletal: Negative for neck pain.   Skin: Negative for color change and rash.   Neurological: Positive for dizziness. Negative for syncope, weakness and headaches.       Objective   Physical Exam   Constitutional: She is oriented to person, place, and time. She appears well-developed and well-nourished. No distress.   HENT:   Head: Normocephalic and atraumatic.   Neck: No JVD present.   Cardiovascular: Normal  rate, regular rhythm, normal heart sounds and intact distal pulses.    No murmur heard.  Pulmonary/Chest: Effort normal and breath sounds normal. No respiratory distress. She exhibits no tenderness.   Abdominal: Soft. She exhibits no distension. There is no tenderness.   Musculoskeletal: She exhibits no edema.   Neurological: She is alert and oriented to person, place, and time.   Skin: Skin is warm and dry. Capillary refill takes less than 2 seconds. She is not diaphoretic. No erythema. No pallor.   Psychiatric: She has a normal mood and affect. Her behavior is normal.   Nursing note and vitals reviewed.      Assessment/Plan      Kaitlynn was seen today for high blood pressure.    Diagnoses and all orders for this visit:    Benign essential HTN  -     lisinopril (PRINIVIL,ZESTRIL) 2.5 MG tablet; Take 1 tablet by mouth Daily.    Benign paroxysmal positional vertigo, unspecified laterality      Start lisinopril 2.5 mg daily.   Check BP daily and log- bring log to FU  Return in about 4 weeks (around 8/13/2018) for and in 2 weeks for a nurse BP check.

## 2018-07-16 NOTE — PATIENT INSTRUCTIONS
How to Take Your Blood Pressure  You can take your blood pressure at home with a machine. You may need to check your blood pressure at home:  · To check if you have high blood pressure (hypertension).  · To check your blood pressure over time.  · To make sure your blood pressure medicine is working.    Supplies needed:  You will need a blood pressure machine, or monitor. You can buy one at a [employer] or online. When choosing one:  · Choose one with an arm cuff.  · Choose one that wraps around your upper arm. Only one finger should fit between your arm and the cuff.  · Do not choose one that measures your blood pressure from your wrist or finger.    Your doctor can suggest a monitor.  How to prepare  Avoid these things for 30 minutes before checking your blood pressure:  · Drinking caffeine.  · Drinking alcohol.  · Eating.  · Smoking.  · Exercising.    Five minutes before checking your blood pressure:  · Pee.  · Sit in a dining chair. Avoid sitting in a soft couch or armchair.  · Be quiet. Do not talk.    How to take your blood pressure  Follow the instructions that came with your machine. If you have a digital blood pressure monitor, these may be the instructions:  1. Sit up straight.  2. Place your feet on the floor. Do not cross your ankles or legs.  3. Rest your left arm at the level of your heart. You may rest it on a table, desk, or chair.  4. Pull up your shirt sleeve.  5. Wrap the blood pressure cuff around the upper part of your left arm. The cuff should be 1 inch (2.5 cm) above your elbow. It is best to wrap the cuff around bare skin.  6. Fit the cuff snugly around your arm. You should be able to place only one finger between the cuff and your arm.  7. Put the cord inside the groove of your elbow.  8. Press the power button.  9. Sit quietly while the cuff fills with air and loses air.  10. Write down the numbers on the screen.  11. Wait 2-3 minutes and then repeat steps 1-10.    What do the numbers  "mean?  Two numbers make up your blood pressure. The first number is called systolic pressure. The second is called diastolic pressure. An example of a blood pressure reading is \"120 over 80\" (or 120/80).  If you are an adult and do not have a medical condition, use this guide to find out if your blood pressure is normal:  Normal  · First number: below 120.  · Second number: below 80.  Elevated  · First number: 120-129.  · Second number: below 80.  Hypertension stage 1  · First number: 130-139.  · Second number: 80-89.  Hypertension stage 2  · First number: 140 or above.  · Second number: 90 or above.  Your blood pressure is above normal even if only the top or bottom number is above normal.  Follow these instructions at home:  · Check your blood pressure as often as your doctor tells you to.  · Take your monitor to your next doctor's appointment. Your doctor will:  ? Make sure you are using it correctly.  ? Make sure it is working right.  · Make sure you understand what your blood pressure numbers should be.  · Tell your doctor if your medicines are causing side effects.  Contact a doctor if:  · Your blood pressure keeps being high.  Get help right away if:  · Your first blood pressure number is higher than 180.  · Your second blood pressure number is higher than 120.  This information is not intended to replace advice given to you by your health care provider. Make sure you discuss any questions you have with your health care provider.  Document Released: 11/30/2009 Document Revised: 11/15/2017 Document Reviewed: 05/26/2017  [employer] Interactive Patient Education © 2018 [employer]    Hypertension  Hypertension is another name for high blood pressure. High blood pressure forces your heart to work harder to pump blood. This can cause problems over time.  There are two numbers in a blood pressure reading. There is a top number (systolic) over a bottom number (diastolic). It is best to have a blood pressure below " 120/80. Healthy choices can help lower your blood pressure. You may need medicine to help lower your blood pressure if:  · Your blood pressure cannot be lowered with healthy choices.  · Your blood pressure is higher than 130/80.    Follow these instructions at home:  Eating and drinking  · If directed, follow the DASH eating plan. This diet includes:  ? Filling half of your plate at each meal with fruits and vegetables.  ? Filling one quarter of your plate at each meal with whole grains. Whole grains include whole wheat pasta, brown rice, and whole grain bread.  ? Eating or drinking low-fat dairy products, such as skim milk or low-fat yogurt.  ? Filling one quarter of your plate at each meal with low-fat (lean) proteins. Low-fat proteins include fish, skinless chicken, eggs, beans, and tofu.  ? Avoiding fatty meat, cured and processed meat, or chicken with skin.  ? Avoiding premade or processed food.  · Eat less than 1,500 mg of salt (sodium) a day.  · Limit alcohol use to no more than 1 drink a day for nonpregnant women and 2 drinks a day for men. One drink equals 12 oz of beer, 5 oz of wine, or 1½ oz of hard liquor.  Lifestyle  · Work with your doctor to stay at a healthy weight or to lose weight. Ask your doctor what the best weight is for you.  · Get at least 30 minutes of exercise that causes your heart to beat faster (aerobic exercise) most days of the week. This may include walking, swimming, or biking.  · Get at least 30 minutes of exercise that strengthens your muscles (resistance exercise) at least 3 days a week. This may include lifting weights or pilates.  · Do not use any products that contain nicotine or tobacco. This includes cigarettes and e-cigarettes. If you need help quitting, ask your doctor.  · Check your blood pressure at home as told by your doctor.  · Keep all follow-up visits as told by your doctor. This is important.  Medicines  · Take over-the-counter and prescription medicines only as  told by your doctor. Follow directions carefully.  · Do not skip doses of blood pressure medicine. The medicine does not work as well if you skip doses. Skipping doses also puts you at risk for problems.  · Ask your doctor about side effects or reactions to medicines that you should watch for.  Contact a doctor if:  · You think you are having a reaction to the medicine you are taking.  · You have headaches that keep coming back (recurring).  · You feel dizzy.  · You have swelling in your ankles.  · You have trouble with your vision.  Get help right away if:  · You get a very bad headache.  · You start to feel confused.  · You feel weak or numb.  · You feel faint.  · You get very bad pain in your:  ? Chest.  ? Belly (abdomen).  · You throw up (vomit) more than once.  · You have trouble breathing.  Summary  · Hypertension is another name for high blood pressure.  · Making healthy choices can help lower blood pressure. If your blood pressure cannot be controlled with healthy choices, you may need to take medicine.  This information is not intended to replace advice given to you by your health care provider. Make sure you discuss any questions you have with your health care provider.  Document Released: 06/05/2009 Document Revised: 11/15/2017 Document Reviewed: 11/15/2017  [employer] Interactive Patient Education © 2018 [employer]

## 2018-07-17 ENCOUNTER — HOSPITAL ENCOUNTER (OUTPATIENT)
Dept: SLEEP MEDICINE | Facility: HOSPITAL | Age: 63
Discharge: HOME | End: 2018-07-17
Attending: INTERNAL MEDICINE | Admitting: INTERNAL MEDICINE

## 2018-07-17 VITALS
WEIGHT: 148 LBS | SYSTOLIC BLOOD PRESSURE: 133 MMHG | HEIGHT: 67 IN | BODY MASS INDEX: 23.23 KG/M2 | HEART RATE: 80 BPM | OXYGEN SATURATION: 97 % | DIASTOLIC BLOOD PRESSURE: 68 MMHG

## 2018-07-17 DIAGNOSIS — R06.83 SNORING: ICD-10-CM

## 2018-07-17 DIAGNOSIS — G47.33 OBSTRUCTIVE SLEEP APNEA, ADULT: ICD-10-CM

## 2018-07-17 PROCEDURE — 95800 SLP STDY UNATTENDED: CPT | Performed by: INTERNAL MEDICINE

## 2018-07-17 PROCEDURE — 95800 SLP STDY UNATTENDED: CPT

## 2018-07-18 DIAGNOSIS — G47.33 MODERATE OBSTRUCTIVE SLEEP APNEA: Primary | ICD-10-CM

## 2018-07-23 ENCOUNTER — TELEPHONE (OUTPATIENT)
Dept: INTERNAL MEDICINE | Facility: CLINIC | Age: 63
End: 2018-07-23

## 2018-07-23 NOTE — TELEPHONE ENCOUNTER
Patient was seen by MB and placed on BP medications. Now her BP is running low and she needs advice on if she should continue medication.

## 2018-07-23 NOTE — PROGRESS NOTES
CALLED PATIENT AND ADVISED OF STUDY RESULTS. PATIENT VERBALIZED UNDERSTANDING AND WAS AGREEABLE TO PAP THERAPY. FAXED ORDER TO [facility] 07/23/18

## 2018-07-30 ENCOUNTER — TELEPHONE (OUTPATIENT)
Dept: INTERNAL MEDICINE | Facility: CLINIC | Age: 63
End: 2018-07-30

## 2018-07-30 ENCOUNTER — CLINICAL SUPPORT (OUTPATIENT)
Dept: INTERNAL MEDICINE | Facility: CLINIC | Age: 63
End: 2018-07-30

## 2018-07-30 VITALS — SYSTOLIC BLOOD PRESSURE: 126 MMHG | DIASTOLIC BLOOD PRESSURE: 82 MMHG

## 2018-07-30 NOTE — TELEPHONE ENCOUNTER
----- Message from OJ Grijalva sent at 7/30/2018 12:14 PM EDT -----  Sounds great. To continue to monitor and hold the lisinopril.   Thanks  ----- Message -----  From: Cherelle Conklin MA  Sent: 7/30/2018  11:44 AM  To: OJ Grijalva    BP today was 126/82. I have given you a copy of her BP readings she has been doing. I told her as of right now to continue what she is doing and to keep monitoring until her next appt with Dr. Valdez. Or call us sooner if her BP starts to go up again.

## 2018-09-04 ENCOUNTER — TELEPHONE (OUTPATIENT)
Dept: INTERNAL MEDICINE | Facility: CLINIC | Age: 63
End: 2018-09-04

## 2018-09-04 ENCOUNTER — OFFICE VISIT (OUTPATIENT)
Dept: INTERNAL MEDICINE | Facility: CLINIC | Age: 63
End: 2018-09-04

## 2018-09-04 VITALS
OXYGEN SATURATION: 99 % | TEMPERATURE: 99 F | HEART RATE: 56 BPM | HEIGHT: 67 IN | BODY MASS INDEX: 23.64 KG/M2 | SYSTOLIC BLOOD PRESSURE: 122 MMHG | WEIGHT: 150.6 LBS | DIASTOLIC BLOOD PRESSURE: 72 MMHG

## 2018-09-04 DIAGNOSIS — E53.8 B12 DEFICIENCY: ICD-10-CM

## 2018-09-04 DIAGNOSIS — R42 VERTIGO: ICD-10-CM

## 2018-09-04 DIAGNOSIS — Z13.820 SCREENING FOR OSTEOPOROSIS: ICD-10-CM

## 2018-09-04 DIAGNOSIS — Z11.59 NEED FOR HEPATITIS C SCREENING TEST: ICD-10-CM

## 2018-09-04 DIAGNOSIS — E78.00 PURE HYPERCHOLESTEROLEMIA: Primary | ICD-10-CM

## 2018-09-04 PROCEDURE — 99214 OFFICE O/P EST MOD 30 MIN: CPT | Performed by: FAMILY MEDICINE
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
## 2018-09-05 ENCOUNTER — TELEPHONE (OUTPATIENT)
Dept: INTERNAL MEDICINE | Facility: CLINIC | Age: 63
End: 2018-09-05

## 2018-09-05 LAB
ALBUMIN SERPL-MCNC: 4.82 G/DL (ref 3.2–4.8)
ALBUMIN/GLOB SERPL: 2.2 G/DL (ref 1.5–2.5)
ALP SERPL-CCNC: 81 U/L (ref 25–100)
ALT SERPL-CCNC: 20 U/L (ref 7–40)
AST SERPL-CCNC: 24 U/L (ref 0–33)
BASOPHILS # BLD AUTO: 0.02 10*3/MM3 (ref 0–0.2)
BASOPHILS NFR BLD AUTO: 0.6 % (ref 0–1)
BILIRUB SERPL-MCNC: 0.6 MG/DL (ref 0.3–1.2)
BUN SERPL-MCNC: 13 MG/DL (ref 9–23)
BUN/CREAT SERPL: 17.8 (ref 7–25)
CALCIUM SERPL-MCNC: 9.3 MG/DL (ref 8.7–10.4)
CHLORIDE SERPL-SCNC: 106 MMOL/L (ref 99–109)
CHOLEST SERPL-MCNC: 291 MG/DL (ref 0–200)
CO2 SERPL-SCNC: 25 MMOL/L (ref 20–31)
CREAT SERPL-MCNC: 0.73 MG/DL (ref 0.6–1.3)
EOSINOPHIL # BLD AUTO: 0.07 10*3/MM3 (ref 0–0.3)
EOSINOPHIL NFR BLD AUTO: 2 % (ref 0–3)
ERYTHROCYTE [DISTWIDTH] IN BLOOD BY AUTOMATED COUNT: 13.3 % (ref 11.3–14.5)
FOLATE SERPL-MCNC: 22.07 NG/ML (ref 3.2–20)
GLOBULIN SER CALC-MCNC: 2.2 GM/DL
GLUCOSE SERPL-MCNC: 86 MG/DL (ref 70–100)
HCT VFR BLD AUTO: 43.2 % (ref 34.5–44)
HCV AB S/CO SERPL IA: <0.1 S/CO RATIO (ref 0–0.9)
HDLC SERPL-MCNC: 95 MG/DL (ref 40–60)
HGB BLD-MCNC: 13.6 G/DL (ref 11.5–15.5)
IMM GRANULOCYTES # BLD: 0 10*3/MM3 (ref 0–0.03)
IMM GRANULOCYTES NFR BLD: 0 % (ref 0–0.6)
LDLC SERPL CALC-MCNC: 183 MG/DL (ref 0–100)
LYMPHOCYTES # BLD AUTO: 1.18 10*3/MM3 (ref 0.6–4.8)
LYMPHOCYTES NFR BLD AUTO: 33.7 % (ref 24–44)
MCH RBC QN AUTO: 32.2 PG (ref 27–31)
MCHC RBC AUTO-ENTMCNC: 31.5 G/DL (ref 32–36)
MCV RBC AUTO: 102.1 FL (ref 80–99)
MONOCYTES # BLD AUTO: 0.32 10*3/MM3 (ref 0–1)
MONOCYTES NFR BLD AUTO: 9.1 % (ref 0–12)
NEUTROPHILS # BLD AUTO: 1.91 10*3/MM3 (ref 1.5–8.3)
NEUTROPHILS NFR BLD AUTO: 54.6 % (ref 41–71)
PLATELET # BLD AUTO: 204 10*3/MM3 (ref 150–450)
POTASSIUM SERPL-SCNC: 4 MMOL/L (ref 3.5–5.5)
PROT SERPL-MCNC: 7 G/DL (ref 5.7–8.2)
RBC # BLD AUTO: 4.23 10*6/MM3 (ref 3.89–5.14)
SODIUM SERPL-SCNC: 143 MMOL/L (ref 132–146)
TRIGL SERPL-MCNC: 66 MG/DL (ref 0–150)
VIT B12 SERPL-MCNC: 1082 PG/ML (ref 211–911)
VLDLC SERPL CALC-MCNC: 13.2 MG/DL
WBC # BLD AUTO: 3.5 10*3/MM3 (ref 3.5–10.8)

## 2018-09-05 NOTE — TELEPHONE ENCOUNTER
----- Message from Adela LIN MD sent at 9/4/2018  1:38 PM EDT -----  Regarding: colonoscopy  Please check with Dr Babcock and Kerri's office for last colonoscopy about 2016

## 2018-09-19 ENCOUNTER — HOSPITAL ENCOUNTER (OUTPATIENT)
Dept: BONE DENSITY | Facility: HOSPITAL | Age: 63
Discharge: HOME | End: 2018-09-19
Admitting: FAMILY MEDICINE

## 2018-09-19 DIAGNOSIS — Z13.820 SCREENING FOR OSTEOPOROSIS: ICD-10-CM

## 2018-09-19 PROBLEM — M85.89 OSTEOPENIA OF MULTIPLE SITES: Status: ACTIVE | Noted: 2018-09-19

## 2018-09-19 PROCEDURE — 77080 DXA BONE DENSITY AXIAL: CPT

## 2018-10-11 ENCOUNTER — OFFICE VISIT (OUTPATIENT)
Dept: SLEEP MEDICINE | Facility: HOSPITAL | Age: 63
End: 2018-10-11

## 2018-10-11 VITALS
WEIGHT: 150.8 LBS | BODY MASS INDEX: 23.67 KG/M2 | TEMPERATURE: 97.1 F | DIASTOLIC BLOOD PRESSURE: 64 MMHG | HEIGHT: 67 IN | OXYGEN SATURATION: 97 % | HEART RATE: 56 BPM | SYSTOLIC BLOOD PRESSURE: 120 MMHG

## 2018-10-11 DIAGNOSIS — G47.33 OSA (OBSTRUCTIVE SLEEP APNEA): Primary | ICD-10-CM

## 2018-10-11 DIAGNOSIS — G47.34 NOCTURNAL HYPOXEMIA: ICD-10-CM

## 2018-10-11 PROCEDURE — 99213 OFFICE O/P EST LOW 20 MIN: CPT | Performed by: NURSE PRACTITIONER

## 2018-10-11 NOTE — PROGRESS NOTES
Subjective: Sleeping Problem        Chief Complaint:   Chief Complaint   Patient presents with   • Sleeping Problem       HPI:    Kaitlynn Le is a 63 y.o. female here for follow-up of day.  Patient was originally seen in consult on 7/9/18 and did have us home sleep study 717 of 18.  She is a patient of Dr. Adela Valdez.  When she awakens during the night she is unable to go back to sleep, she does snore, and she is not rested in the a.m.  She has used her CPAP therapy 100% of the time but is not able to tell any difference in her sleep.  She stills feels unrested upon awakening.  She is getting approximately 4 hours of sleep nightly and her Esperance score is 14/24.  She did have some nocturnal hypoxemia on her study and we must do a titration to make sure that that has been corrected with CPAP therapy.  Patient is open to this idea and would like to proceed forward.        Current medications are:   Current Outpatient Prescriptions:   •  aspirin 81 MG chewable tablet, Chew 81 mg Daily., Disp: , Rfl:   •  Calcium Citrate-Vitamin D (CALCIUM CITRATE + D PO), Take  by mouth., Disp: , Rfl:   •  clobetasol (TEMOVATE) 0.05 % cream, As Needed., Disp: , Rfl:   •  exemestane (AROMASIN) 25 MG chemo tablet, 1 tablet Daily., Disp: , Rfl:   •  metroNIDAZOLE (METROCREAM) 0.75 % cream, As Needed., Disp: , Rfl:   •  nystatin (MYCOSTATIN) 644715 UNIT/GM cream, As Needed., Disp: , Rfl:   •  triamcinolone (KENALOG) 0.1 % cream, As Needed., Disp: , Rfl:   •  vitamin B-12 (CYANOCOBALAMIN) 500 MCG tablet, Take 500 mcg by mouth Daily., Disp: , Rfl: .      The patient's relevant past medical, surgical, family and social history were reviewed and updated in Epic as appropriate.       Review of Systems   HENT: Positive for hearing loss and tinnitus.         Vertigo   Respiratory: Positive for apnea.    Psychiatric/Behavioral: Positive for sleep disturbance.   All other systems reviewed and are negative.        Objective:    Physical Exam    Constitutional: She is oriented to person, place, and time. She appears well-developed and well-nourished.   HENT:   Head: Normocephalic and atraumatic.   Mouth/Throat: Oropharynx is clear and moist.   Mallampati 3 anatomy   Eyes: Conjunctivae are normal.   Neck: Neck supple. No thyromegaly present.   Cardiovascular: Normal rate and regular rhythm.    Pulmonary/Chest: Effort normal and breath sounds normal.   Lymphadenopathy:     She has no cervical adenopathy.   Neurological: She is alert and oriented to person, place, and time.   Skin: Skin is warm and dry.   Psychiatric: She has a normal mood and affect. Her behavior is normal. Judgment and thought content normal.   Nursing note and vitals reviewed.   download and compliance has been reviewed.  She has 100% compliant with her CPAP therapy.  Her AHI is 2.3 and 90% pressure is 10.7   ASSESSMENT/PLAN    Kaitlynn was seen today for sleeping problem.    Diagnoses and all orders for this visit:    LAINE (obstructive sleep apnea)  -     CPAP Therapy  -     Polysomnography 4 or More Parameters With CPAP; Future    Nocturnal hypoxemia  -     Polysomnography 4 or More Parameters With CPAP; Future            1. Counseled patient regarding multimodal approach with healthy nutrition, healthy sleep, regular physical activity, social activities, counseling, and medications. Encouraged to practice lateral sleep position. Avoid alcohol and sedatives close to bedtime.  2. I have refilled her supplies.  Patient in order for polysomnogram with CPAP therapy to make sure her nocturnal hypoxemia was corrected.  I'll then see her back after her study.    I have reviewed the results of my evaluation and impression and discussed my recommendations in detail with the patient.      Signed by  OJ Almendarez    October 11, 2018      CC: Adela Valdez MD          No ref. provider found

## 2018-10-11 NOTE — PATIENT INSTRUCTIONS
Hypoxemia  Hypoxemia occurs when the blood does not contain enough oxygen. The body cannot work well when it does not have enough oxygen because every part of the body needs oxygen. Oxygen enters the lungs when we breathe in, then it travels to all parts of the body through the blood. Hypoxemia can develop suddenly or slowly.  What are the causes?  Common causes of this condition include:  · Long-term (chronic) lung diseases, such as chronic obstructive pulmonary disease (COPD) or interstitial lung disease.  · Disorders that affect breathing at night, such as sleep apnea.  · Fluid buildup in the lungs (pulmonary edema).  · Lung infection (pneumonia).  · Lung or throat cancer.  · Abnormal blood flow that bypasses the lungs (having a shunt).  · Certain diseases that affect nerves or muscles.  · A collapsed lung (pneumothorax).  · A blood clot in the lungs (pulmonary embolus).  · Certain types of heart disease.  · Slow or shallow breathing (hypoventilation).  · Certain medicines.  · High altitudes.  · Toxic chemicals, smoke, and gases.    What are the signs or symptoms?  In some cases, there may be no symptoms of this condition. If you do have symptoms, they may include:  · Shortness of breath (dyspnea).  · Bluish color of the skin, lips, or nail beds.  · Breathing that is fast, noisy, or shallow.  · A fast heartbeat.  · Feeling tired or sleepy.  · Feeling confused or worried.    If hypoxemia develops quickly, you will likely have dyspnea. If hypoxemia develops slowly over months or years, you may not notice any symptoms.  How is this diagnosed?  This condition is diagnosed by:  · A physical exam.  · Blood tests.  · A test that measures the percentage of oxygen in your blood (pulse oximetry). This is done with a sensor that is placed on your finger, toe, or earlobe.    How is this treated?  Treatment for this condition depends on the underlying cause of your hypoxemia. You will likely be treated with oxygen therapy to  restore your blood oxygen level. Depending on the cause of your hypoxemia, you may need oxygen therapy for a short time (weeks or months), or you may need it for the rest of your life.  Your health care provider may also recommend other therapies to treat the underlying cause of your hypoxemia.  Follow these instructions at home:  · Take over-the-counter and prescription medicines only as told by your health care provider.  · If you are on oxygen therapy, follow oxygen safety precautions as directed by your health care provider. These may include:  ? Always having a backup supply of oxygen.  ? Not allowing anyone to smoke or have a fire around oxygen.  ? Handling oxygen tanks carefully and as instructed.  · Do not use any products that contain nicotine or tobacco, such as cigarettes and e-cigarettes. If you need help quitting, ask your health care provider. Stay away from people who smoke.  · Keep all follow-up visits as told by your health care provider. This is important.  Contact a health care provider if:  · You have any concerns about your oxygen therapy.  · You have trouble breathing, even during or after treatment.  · You become short of breath when you exercise.  · You are tired when you wake up.  · You have a headache when you wake up.  Get help right away if:  · Your shortness of breath gets worse, especially with normal or minimal activity.  · You have a bluish color of the skin, lips, or nail beds.  · You become confused or you cannot think properly.  · You cough up dark mucus or blood.  · You have chest pain.  · You have a fever.  Summary  · Hypoxemia occurs when the blood does not contain enough oxygen.  · Hypoxemia may or may not cause symptoms. Often, the main symptom is shortness of breath (dyspnea).  · Depending on the cause of your hypoxemia, you may need oxygen therapy for a short time (weeks or months), or you may need it for the rest of your life.  · If you are on oxygen therapy, follow oxygen  safety precautions as directed by your health care provider.  This information is not intended to replace advice given to you by your health care provider. Make sure you discuss any questions you have with your health care provider.  Document Released: 07/02/2012 Document Revised: 11/21/2017 Document Reviewed: 11/21/2017  [employer] Interactive Patient Education © 2017 [employer]  Insomnia  Insomnia is a sleep disorder that makes it difficult to fall asleep or to stay asleep. Insomnia can cause tiredness (fatigue), low energy, difficulty concentrating, mood swings, and poor performance at work or school.  There are three different ways to classify insomnia:  · Difficulty falling asleep.  · Difficulty staying asleep.  · Waking up too early in the morning.    Any type of insomnia can be long-term (chronic) or short-term (acute). Both are common. Short-term insomnia usually lasts for three months or less. Chronic insomnia occurs at least three times a week for longer than three months.  What are the causes?  Insomnia may be caused by another condition, situation, or substance, such as:  · Anxiety.  · Certain medicines.  · Gastroesophageal reflux disease (GERD) or other gastrointestinal conditions.  · Asthma or other breathing conditions.  · Restless legs syndrome, sleep apnea, or other sleep disorders.  · Chronic pain.  · Menopause. This may include hot flashes.  · Stroke.  · Abuse of alcohol, tobacco, or illegal drugs.  · Depression.  · Caffeine.  · Neurological disorders, such as Alzheimer disease.  · An overactive thyroid (hyperthyroidism).    The cause of insomnia may not be known.  What increases the risk?  Risk factors for insomnia include:  · Gender. Women are more commonly affected than men.  · Age. Insomnia is more common as you get older.  · Stress. This may involve your professional or personal life.  · Income. Insomnia is more common in people with lower income.  · Lack of exercise.  · Irregular work  schedule or night shifts.  · Traveling between different time zones.    What are the signs or symptoms?  If you have insomnia, trouble falling asleep or trouble staying asleep is the main symptom. This may lead to other symptoms, such as:  · Feeling fatigued.  · Feeling nervous about going to sleep.  · Not feeling rested in the morning.  · Having trouble concentrating.  · Feeling irritable, anxious, or depressed.    How is this treated?  Treatment for insomnia depends on the cause. If your insomnia is caused by an underlying condition, treatment will focus on addressing the condition. Treatment may also include:  · Medicines to help you sleep.  · Counseling or therapy.  · Lifestyle adjustments.    Follow these instructions at home:  · Take medicines only as directed by your health care provider.  · Keep regular sleeping and waking hours. Avoid naps.  · Keep a sleep diary to help you and your health care provider figure out what could be causing your insomnia. Include:  ? When you sleep.  ? When you wake up during the night.  ? How well you sleep.  ? How rested you feel the next day.  ? Any side effects of medicines you are taking.  ? What you eat and drink.  · Make your bedroom a comfortable place where it is easy to fall asleep:  ? Put up shades or special blackout curtains to block light from outside.  ? Use a white noise machine to block noise.  ? Keep the temperature cool.  · Exercise regularly as directed by your health care provider. Avoid exercising right before bedtime.  · Use relaxation techniques to manage stress. Ask your health care provider to suggest some techniques that may work well for you. These may include:  ? Breathing exercises.  ? Routines to release muscle tension.  ? Visualizing peaceful scenes.  · Cut back on alcohol, caffeinated beverages, and cigarettes, especially close to bedtime. These can disrupt your sleep.  · Do not overeat or eat spicy foods right before bedtime. This can lead to  digestive discomfort that can make it hard for you to sleep.  · Limit screen use before bedtime. This includes:  ? Watching TV.  ? Using your smartphone, tablet, and computer.  · Stick to a routine. This can help you fall asleep faster. Try to do a quiet activity, brush your teeth, and go to bed at the same time each night.  · Get out of bed if you are still awake after 15 minutes of trying to sleep. Keep the lights down, but try reading or doing a quiet activity. When you feel sleepy, go back to bed.  · Make sure that you drive carefully. Avoid driving if you feel very sleepy.  · Keep all follow-up appointments as directed by your health care provider. This is important.  Contact a health care provider if:  · You are tired throughout the day or have trouble in your daily routine due to sleepiness.  · You continue to have sleep problems or your sleep problems get worse.  Get help right away if:  · You have serious thoughts about hurting yourself or someone else.  This information is not intended to replace advice given to you by your health care provider. Make sure you discuss any questions you have with your health care provider.  Document Released: 12/15/2001 Document Revised: 05/19/2017 Document Reviewed: 09/18/2015  [employer] Interactive Patient Education © 2018 [employer]  Sleep Apnea  Sleep apnea is a condition that affects breathing. People with sleep apnea have moments during sleep when their breathing pauses briefly or gets shallow. Sleep apnea can cause these symptoms:  · Trouble staying asleep.  · Sleepiness or tiredness during the day.  · Irritability.  · Loud snoring.  · Morning headaches.  · Trouble concentrating.  · Forgetting things.  · Less interest in sex.  · Being sleepy for no reason.  · Mood swings.  · Personality changes.  · Depression.  · Waking up a lot during the night to pee (urinate).  · Dry mouth.  · Sore throat.    Follow these instructions at home:  · Make any changes in your routine  that your doctor recommends.  · Eat a healthy, well-balanced diet.  · Take over-the-counter and prescription medicines only as told by your doctor.  · Avoid using alcohol, calming medicines (sedatives), and narcotic medicines.  · Take steps to lose weight if you are overweight.  · If you were given a machine (device) to use while you sleep, use it only as told by your doctor.  · Do not use any tobacco products, such as cigarettes, chewing tobacco, and e-cigarettes. If you need help quitting, ask your doctor.  · Keep all follow-up visits as told by your doctor. This is important.  Contact a doctor if:  · The machine that you were given to use during sleep is uncomfortable or does not seem to be working.  · Your symptoms do not get better.  · Your symptoms get worse.  Get help right away if:  · Your chest hurts.  · You have trouble breathing in enough air (shortness of breath).  · You have an uncomfortable feeling in your back, arms, or stomach.  · You have trouble talking.  · One side of your body feels weak.  · A part of your face is hanging down (drooping).  These symptoms may be an emergency. Do not wait to see if the symptoms will go away. Get medical help right away. Call your local emergency services (911 in the [place] Do not drive yourself to the hospital.  This information is not intended to replace advice given to you by your health care provider. Make sure you discuss any questions you have with your health care provider.  Document Released: 09/26/2009 Document Revised: 08/13/2017 Document Reviewed: 09/26/2016  [employer] Interactive Patient Education © 2018 [employer]

## 2018-10-30 ENCOUNTER — HOSPITAL ENCOUNTER (OUTPATIENT)
Dept: SLEEP MEDICINE | Facility: HOSPITAL | Age: 63
Discharge: HOME | End: 2018-10-30
Admitting: NURSE PRACTITIONER

## 2018-10-30 VITALS
DIASTOLIC BLOOD PRESSURE: 49 MMHG | SYSTOLIC BLOOD PRESSURE: 139 MMHG | OXYGEN SATURATION: 96 % | HEART RATE: 74 BPM | HEIGHT: 67 IN | WEIGHT: 153.66 LBS | BODY MASS INDEX: 24.12 KG/M2

## 2018-10-30 DIAGNOSIS — G47.33 OSA (OBSTRUCTIVE SLEEP APNEA): ICD-10-CM

## 2018-10-30 DIAGNOSIS — G47.34 NOCTURNAL HYPOXEMIA: ICD-10-CM

## 2018-10-30 PROCEDURE — 95811 POLYSOM 6/>YRS CPAP 4/> PARM: CPT | Performed by: INTERNAL MEDICINE

## 2018-10-30 PROCEDURE — 95811 POLYSOM 6/>YRS CPAP 4/> PARM: CPT

## 2018-10-31 ENCOUNTER — TELEPHONE (OUTPATIENT)
Dept: SLEEP MEDICINE | Facility: HOSPITAL | Age: 63
End: 2018-10-31

## 2018-10-31 NOTE — TELEPHONE ENCOUNTER
CALLED PATIENT TO ADVISE OF STUDY RESULTS. PATIENT STATED THAT SHE WAS IN A MEETING AND WOULD CALL BACK

## 2018-10-31 NOTE — TELEPHONE ENCOUNTER
----- Message from Nickolas Espinosa sent at 10/31/2018 10:10 AM EDT -----  Regarding: Needs a follow up visit  Her titration study did show that her LAINE is well-controlled, however she has numerous limb movements that will likely require medication.  I'm sure that either Dr. Beard or Maggy will want to speak with her in person to talk about any medication being started so if you'll schedule her in sometime in the coming week or two for results that would be good.  She does need a nasal pillow size change - she was wearing a medium at home and benefits more from a size large nasal pillow in the particular mask she is using.

## 2018-11-02 ENCOUNTER — TELEPHONE (OUTPATIENT)
Dept: SLEEP MEDICINE | Facility: HOSPITAL | Age: 63
End: 2018-11-02

## 2018-11-05 ENCOUNTER — OFFICE VISIT (OUTPATIENT)
Dept: SLEEP MEDICINE | Facility: HOSPITAL | Age: 63
End: 2018-11-05

## 2018-11-05 VITALS
HEART RATE: 72 BPM | OXYGEN SATURATION: 97 % | BODY MASS INDEX: 23.92 KG/M2 | SYSTOLIC BLOOD PRESSURE: 106 MMHG | DIASTOLIC BLOOD PRESSURE: 56 MMHG | HEIGHT: 67 IN | WEIGHT: 152.4 LBS

## 2018-11-05 DIAGNOSIS — G47.61 PLMD (PERIODIC LIMB MOVEMENT DISORDER): ICD-10-CM

## 2018-11-05 DIAGNOSIS — G47.33 OSA (OBSTRUCTIVE SLEEP APNEA): Primary | ICD-10-CM

## 2018-11-05 PROCEDURE — 99213 OFFICE O/P EST LOW 20 MIN: CPT | Performed by: NURSE PRACTITIONER

## 2018-11-05 RX ORDER — ROPINIROLE 0.25 MG/1
TABLET, FILM COATED ORAL
Qty: 60 TABLET | Refills: 3 | Status: SHIPPED | OUTPATIENT
Start: 2018-11-05 | End: 2019-03-11 | Stop reason: SDUPTHER

## 2018-11-05 NOTE — PROGRESS NOTES
Subjective: Follow-up        Chief Complaint:   Chief Complaint   Patient presents with   • Follow-up       HPI:    Kaitlynn Le is a 63 y.o. female here for follow-up of day.nocturnal hypoxemia, plmd.  Patient continues to do well with her CPAP therapy.  She is sleeping approximately 6 hours nightly and her Merkel score is 13/24.  Her AHI is normal and her current settings.  Patient does feel rested upon awakening and does not nap.  It also appears her nocturnal hypoxemia has been corrected with CPAP therapy.  Patient is still having moderate leg movement while reclining in her chair and at night this seems to be mostly in her right leg.  She does feel this does keep her at times from getting a restful sleep.  Patient is interested in trying a medication to help with this.        Current medications are:   Current Outpatient Prescriptions:   •  aspirin 81 MG chewable tablet, Chew 81 mg Daily., Disp: , Rfl:   •  Calcium Citrate-Vitamin D (CALCIUM CITRATE + D PO), Take  by mouth., Disp: , Rfl:   •  clobetasol (TEMOVATE) 0.05 % cream, As Needed., Disp: , Rfl:   •  exemestane (AROMASIN) 25 MG chemo tablet, 1 tablet Daily., Disp: , Rfl:   •  metroNIDAZOLE (METROCREAM) 0.75 % cream, As Needed., Disp: , Rfl:   •  nystatin (MYCOSTATIN) 459069 UNIT/GM cream, As Needed., Disp: , Rfl:   •  triamcinolone (KENALOG) 0.1 % cream, As Needed., Disp: , Rfl:   •  vitamin B-12 (CYANOCOBALAMIN) 500 MCG tablet, Take 500 mcg by mouth Daily., Disp: , Rfl:   •  rOPINIRole (REQUIP) 0.25 MG tablet, 1-2 po qhs, Disp: 60 tablet, Rfl: 3.      The patient's relevant past medical, surgical, family and social history were reviewed and updated in Epic as appropriate.       Review of Systems   HENT: Positive for tinnitus.    Respiratory: Positive for apnea.    Musculoskeletal: Positive for neck pain.   Psychiatric/Behavioral: Positive for sleep disturbance.         Objective:    Physical Exam   Constitutional: She is oriented to person, place,  and time. She appears well-developed and well-nourished.   HENT:   Head: Normocephalic and atraumatic.   Mouth/Throat: Oropharynx is clear and moist.   Mallampati 3 anatomy   Eyes: Conjunctivae are normal.   Neck: Neck supple. No thyromegaly present.   Cardiovascular: Normal rate and regular rhythm.    Pulmonary/Chest: Effort normal and breath sounds normal.   Lymphadenopathy:     She has no cervical adenopathy.   Neurological: She is alert and oriented to person, place, and time.   Skin: Skin is warm and dry.   Psychiatric: She has a normal mood and affect. Her behavior is normal. Judgment and thought content normal.   Nursing note and vitals reviewed.    Download a compliance reviewed with patient today.  Greater than 4 hour use 97.8%.  AHI controlled at 2.4.  90% pressure at 10.9.    ASSESSMENT/PLAN    Kaitlynn was seen today for follow-up.    Diagnoses and all orders for this visit:    LAINE (obstructive sleep apnea)  -     CPAP Therapy    PLMD (periodic limb movement disorder)  -     rOPINIRole (REQUIP) 0.25 MG tablet; 1-2 po qhs            1. Counseled patient regarding multimodal approach with healthy nutrition, healthy sleep, regular physical activity, social activities, counseling, and medications. Encouraged to practice lateral sleep position. Avoid alcohol and sedatives close to bedtime.  2. We will try a low dose of Requip she can titrate this from one to 2 tablets by mouth daily at bedtime and I will see her back in 3 months to reevaluate efficacy.    I have reviewed the results of my evaluation and impression and discussed my recommendations in detail with the patient.      Signed by  OJ Almendarez    November 5, 2018      CC: Adela Valdez MD          No ref. provider found

## 2018-11-05 NOTE — PATIENT INSTRUCTIONS
Restless Legs Syndrome  Restless legs syndrome is a condition that causes uncomfortable feelings or sensations in the legs, especially while sitting or lying down. The sensations usually cause an overwhelming urge to move the legs. The arms can also sometimes be affected.  The condition can range from mild to severe. The symptoms often interfere with a person's ability to sleep.  What are the causes?  The cause of this condition is not known.  What increases the risk?  This condition is more likely to develop in:  · People who are older than age 50.  · Pregnant women. In general, restless legs syndrome is more common in women than in men.  · People who have a family history of the condition.  · People who have certain medical conditions, such as iron deficiency, kidney disease, Parkinson disease, or nerve damage.  · People who take certain medicines, such as medicines for high blood pressure, nausea, colds, allergies, depression, and some heart conditions.    What are the signs or symptoms?  The main symptom of this condition is uncomfortable sensations in the legs. These sensations may be:  · Described as pulling, tingling, prickling, throbbing, crawling, or burning.  · Worse while you are sitting or lying down.  · Worse during periods of rest or inactivity.  · Worse at night, often interfering with your sleep.  · Accompanied by a very strong urge to move your legs.  · Temporarily relieved by movement of your legs.    The sensations usually affect both sides of the body. The arms can also be affected, but this is rare. People who have this condition often have tiredness during the day because of their lack of sleep at night.  How is this diagnosed?  This condition may be diagnosed based on your description of the symptoms. You may also have tests, including blood tests, to check for other conditions that may lead to your symptoms. In some cases, you may be asked to spend some time in a sleep lab so your sleeping  can be monitored.  How is this treated?  Treatment for this condition is focused on managing the symptoms. Treatment may include:  · Self-help and lifestyle changes.  · Medicines.    Follow these instructions at home:  · Take medicines only as directed by your health care provider.  · Try these methods to get temporary relief from the uncomfortable sensations:  ? Massage your legs.  ? Walk or stretch.  ? Take a cold or hot bath.  · Practice good sleep habits. For example, go to bed and get up at the same time every day.  · Exercise regularly.  · Practice ways of relaxing, such as yoga or meditation.  · Avoid caffeine and alcohol.  · Do not use any tobacco products, including cigarettes, chewing tobacco, or electronic cigarettes. If you need help quitting, ask your health care provider.  · Keep all follow-up visits as directed by your health care provider. This is important.  Contact a health care provider if:  Your symptoms do not improve with treatment, or they get worse.  This information is not intended to replace advice given to you by your health care provider. Make sure you discuss any questions you have with your health care provider.  Document Released: 12/08/2003 Document Revised: 05/25/2017 Document Reviewed: 12/14/2015  [employer] Interactive Patient Education © 2018 [employer]  Sleep Apnea  Sleep apnea is a condition that affects breathing. People with sleep apnea have moments during sleep when their breathing pauses briefly or gets shallow. Sleep apnea can cause these symptoms:  · Trouble staying asleep.  · Sleepiness or tiredness during the day.  · Irritability.  · Loud snoring.  · Morning headaches.  · Trouble concentrating.  · Forgetting things.  · Less interest in sex.  · Being sleepy for no reason.  · Mood swings.  · Personality changes.  · Depression.  · Waking up a lot during the night to pee (urinate).  · Dry mouth.  · Sore throat.    Follow these instructions at home:  · Make any changes in  your routine that your doctor recommends.  · Eat a healthy, well-balanced diet.  · Take over-the-counter and prescription medicines only as told by your doctor.  · Avoid using alcohol, calming medicines (sedatives), and narcotic medicines.  · Take steps to lose weight if you are overweight.  · If you were given a machine (device) to use while you sleep, use it only as told by your doctor.  · Do not use any tobacco products, such as cigarettes, chewing tobacco, and e-cigarettes. If you need help quitting, ask your doctor.  · Keep all follow-up visits as told by your doctor. This is important.  Contact a doctor if:  · The machine that you were given to use during sleep is uncomfortable or does not seem to be working.  · Your symptoms do not get better.  · Your symptoms get worse.  Get help right away if:  · Your chest hurts.  · You have trouble breathing in enough air (shortness of breath).  · You have an uncomfortable feeling in your back, arms, or stomach.  · You have trouble talking.  · One side of your body feels weak.  · A part of your face is hanging down (drooping).  These symptoms may be an emergency. Do not wait to see if the symptoms will go away. Get medical help right away. Call your local emergency services (911 in the [place] Do not drive yourself to the hospital.  This information is not intended to replace advice given to you by your health care provider. Make sure you discuss any questions you have with your health care provider.  Document Released: 09/26/2009 Document Revised: 08/13/2017 Document Reviewed: 09/26/2016  [employer] Interactive Patient Education © 2018 [employer]

## 2019-01-23 ENCOUNTER — TELEPHONE (OUTPATIENT)
Dept: INTERNAL MEDICINE | Facility: CLINIC | Age: 64
End: 2019-01-23

## 2019-01-23 NOTE — TELEPHONE ENCOUNTER
MS. MILLER SAYS THAT SHE HAS AN APPT ON 3/4/2018 , SHE WOULD LIKE TO KNOW IF SHE IS STILL NEEDING TO KEEP APPT BECAUSE DR. PHAM ORIGINALLY PUT HER ON [facility] AND TOOK HER OFF. SO SHE WOULD LIKE A CALL BACK

## 2019-01-24 NOTE — TELEPHONE ENCOUNTER
LVM that she would need to keep her appt so she can see Dr. Valdez and repeat her labs from last time.

## 2019-02-05 ENCOUNTER — OFFICE VISIT (OUTPATIENT)
Dept: SLEEP MEDICINE | Facility: HOSPITAL | Age: 64
End: 2019-02-05

## 2019-02-05 VITALS
OXYGEN SATURATION: 98 % | WEIGHT: 155 LBS | SYSTOLIC BLOOD PRESSURE: 140 MMHG | HEIGHT: 67 IN | DIASTOLIC BLOOD PRESSURE: 64 MMHG | BODY MASS INDEX: 24.33 KG/M2 | HEART RATE: 67 BPM

## 2019-02-05 DIAGNOSIS — G47.33 OSA (OBSTRUCTIVE SLEEP APNEA): Primary | ICD-10-CM

## 2019-02-05 DIAGNOSIS — F51.04 PSYCHOPHYSIOLOGICAL INSOMNIA: ICD-10-CM

## 2019-02-05 DIAGNOSIS — G25.81 RESTLESS LEGS SYNDROME (RLS): ICD-10-CM

## 2019-02-05 PROCEDURE — 99214 OFFICE O/P EST MOD 30 MIN: CPT | Performed by: NURSE PRACTITIONER

## 2019-02-05 NOTE — PROGRESS NOTES
Subjective:   Follow-up      Chief Complaint:   Chief Complaint   Patient presents with   • Follow-up       HPI:    Kaitlynn Le is a 63 y.o. female here for follow-up of day, plmd, insomnia.  Patient was last seen 11/5/18 for DAY and PLMD.  Patient did start Requip 0.25 mg 2 at bedtime and did very well with this dose.  In the past month she has realized that she sleeps very well throughout the night and has no leg kicking, however, in the late afternoon she is very conscious of kicking and jerking of her legs.  Patient is having trouble with insomnia.  She did do the [facility] sleep program without relief.  She does go to bed and sleeps 2 hours but then awakens every hour thereafter before finally getting up for the day.  She is not rested.  Her [place] score is 14/24.  Patient has not tried anything in the past medication wise to help with sleep.        Current medications are:   Current Outpatient Medications:   •  aspirin 81 MG chewable tablet, Chew 81 mg Daily., Disp: , Rfl:   •  Calcium Citrate-Vitamin D (CALCIUM CITRATE + D PO), Take  by mouth., Disp: , Rfl:   •  exemestane (AROMASIN) 25 MG chemo tablet, 1 tablet Daily., Disp: , Rfl:   •  metroNIDAZOLE (METROCREAM) 0.75 % cream, As Needed., Disp: , Rfl:   •  nystatin (MYCOSTATIN) [id number] UNIT/GM cream, As Needed., Disp: , Rfl:   •  rOPINIRole (REQUIP) 0.25 MG tablet, 1-2 po qhs, Disp: 60 tablet, Rfl: 3  •  triamcinolone (KENALOG) 0.1 % cream, As Needed., Disp: , Rfl:   •  vitamin B-12 (CYANOCOBALAMIN) 500 MCG tablet, Take 500 mcg by mouth Daily., Disp: , Rfl:   •  clobetasol (TEMOVATE) 0.05 % cream, As Needed., Disp: , Rfl: .      The patient's relevant past medical, surgical, family and social history were reviewed and updated in Epic as appropriate.       Review of Systems   Constitutional: Positive for fatigue.   HENT: Positive for tinnitus.    Eyes: Positive for visual disturbance.   Respiratory: Positive for apnea.    Musculoskeletal: Positive  for arthralgias, joint swelling and neck pain.   Psychiatric/Behavioral: Positive for sleep disturbance.   All other systems reviewed and are negative.        Objective:    Physical Exam   Constitutional: She is oriented to person, place, and time. She appears well-developed and well-nourished.   HENT:   Head: Normocephalic and atraumatic.   Mouth/Throat: Oropharynx is clear and moist.   Mallampati 3 anatomy   Eyes: Conjunctivae are normal.   Neck: Neck supple. No thyromegaly present.   Cardiovascular: Normal rate and regular rhythm.   Pulmonary/Chest: Effort normal and breath sounds normal.   Lymphadenopathy:     She has no cervical adenopathy.   Neurological: She is alert and oriented to person, place, and time.   Skin: Skin is warm and dry.   Psychiatric: She has a normal mood and affect. Her behavior is normal. Judgment and thought content normal.   Nursing note and vitals reviewed.    179/180 days of use.  AHI 2.  Greater than 4 hour usage 99.4.  90% pressure 11.3 cm H2O.    ASSESSMENT/PLAN    Kaitlynn was seen today for follow-up.    Diagnoses and all orders for this visit:    LAINE (obstructive sleep apnea)  -     CPAP Therapy    Psychophysiological insomnia    Restless legs syndrome (RLS)            1. Counseled patient regarding multimodal approach with healthy nutrition, healthy sleep, regular physical activity, social activities, counseling, and medications. Encouraged to practice lateral sleep position. Avoid alcohol and sedatives close to bedtime.  2. Refill supplies ×1 year.  Patient to take REM fresh nightly to see if this does help with her insomnia.  If it does not think she needs to try an additional medication she will return to clinic in 4-5 weeks.  If this does help her she will buy this over-the-counter and she can return to clinic in one year.  3. She is going to try Requip 1 at night and one at noon to see if this helps with both nighttime and daytime restless legs.  If this does not help she  would be interested in increasing her dose.    I have reviewed the results of my evaluation and impression and discussed my recommendations in detail with the patient.      Signed by  Maggy Carr, APRN    February 5, 2019      CC: Adela Valdez MD          No ref. provider found

## 2019-02-05 NOTE — PATIENT INSTRUCTIONS
Insomnia  Insomnia is a sleep disorder that makes it difficult to fall asleep or to stay asleep. Insomnia can cause tiredness (fatigue), low energy, difficulty concentrating, mood swings, and poor performance at work or school.  There are three different ways to classify insomnia:  · Difficulty falling asleep.  · Difficulty staying asleep.  · Waking up too early in the morning.    Any type of insomnia can be long-term (chronic) or short-term (acute). Both are common. Short-term insomnia usually lasts for three months or less. Chronic insomnia occurs at least three times a week for longer than three months.  What are the causes?  Insomnia may be caused by another condition, situation, or substance, such as:  · Anxiety.  · Certain medicines.  · Gastroesophageal reflux disease (GERD) or other gastrointestinal conditions.  · Asthma or other breathing conditions.  · Restless legs syndrome, sleep apnea, or other sleep disorders.  · Chronic pain.  · Menopause. This may include hot flashes.  · Stroke.  · Abuse of alcohol, tobacco, or illegal drugs.  · Depression.  · Caffeine.  · Neurological disorders, such as Alzheimer disease.  · An overactive thyroid (hyperthyroidism).    The cause of insomnia may not be known.  What increases the risk?  Risk factors for insomnia include:  · Gender. Women are more commonly affected than men.  · Age. Insomnia is more common as you get older.  · Stress. This may involve your professional or personal life.  · Income. Insomnia is more common in people with lower income.  · Lack of exercise.  · Irregular work schedule or night shifts.  · Traveling between different time zones.    What are the signs or symptoms?  If you have insomnia, trouble falling asleep or trouble staying asleep is the main symptom. This may lead to other symptoms, such as:  · Feeling fatigued.  · Feeling nervous about going to sleep.  · Not feeling rested in the morning.  · Having trouble concentrating.  · Feeling  irritable, anxious, or depressed.    How is this treated?  Treatment for insomnia depends on the cause. If your insomnia is caused by an underlying condition, treatment will focus on addressing the condition. Treatment may also include:  · Medicines to help you sleep.  · Counseling or therapy.  · Lifestyle adjustments.    Follow these instructions at home:  · Take medicines only as directed by your health care provider.  · Keep regular sleeping and waking hours. Avoid naps.  · Keep a sleep diary to help you and your health care provider figure out what could be causing your insomnia. Include:  ? When you sleep.  ? When you wake up during the night.  ? How well you sleep.  ? How rested you feel the next day.  ? Any side effects of medicines you are taking.  ? What you eat and drink.  · Make your bedroom a comfortable place where it is easy to fall asleep:  ? Put up shades or special blackout curtains to block light from outside.  ? Use a white noise machine to block noise.  ? Keep the temperature cool.  · Exercise regularly as directed by your health care provider. Avoid exercising right before bedtime.  · Use relaxation techniques to manage stress. Ask your health care provider to suggest some techniques that may work well for you. These may include:  ? Breathing exercises.  ? Routines to release muscle tension.  ? Visualizing peaceful scenes.  · Cut back on alcohol, caffeinated beverages, and cigarettes, especially close to bedtime. These can disrupt your sleep.  · Do not overeat or eat spicy foods right before bedtime. This can lead to digestive discomfort that can make it hard for you to sleep.  · Limit screen use before bedtime. This includes:  ? Watching TV.  ? Using your smartphone, tablet, and computer.  · Stick to a routine. This can help you fall asleep faster. Try to do a quiet activity, brush your teeth, and go to bed at the same time each night.  · Get out of bed if you are still awake after 15 minutes  of trying to sleep. Keep the lights down, but try reading or doing a quiet activity. When you feel sleepy, go back to bed.  · Make sure that you drive carefully. Avoid driving if you feel very sleepy.  · Keep all follow-up appointments as directed by your health care provider. This is important.  Contact a health care provider if:  · You are tired throughout the day or have trouble in your daily routine due to sleepiness.  · You continue to have sleep problems or your sleep problems get worse.  Get help right away if:  · You have serious thoughts about hurting yourself or someone else.  This information is not intended to replace advice given to you by your health care provider. Make sure you discuss any questions you have with your health care provider.  Document Released: 12/15/2001 Document Revised: 05/19/2017 Document Reviewed: 09/18/2015  [email or web address] Interactive Patient Education © 2018 [email or web address]  Restless Legs Syndrome  Restless legs syndrome is a condition that causes uncomfortable feelings or sensations in the legs, especially while sitting or lying down. The sensations usually cause an overwhelming urge to move the legs. The arms can also sometimes be affected.  The condition can range from mild to severe. The symptoms often interfere with a person's ability to sleep.  What are the causes?  The cause of this condition is not known.  What increases the risk?  This condition is more likely to develop in:  · People who are older than age 50.  · Pregnant women. In general, restless legs syndrome is more common in women than in men.  · People who have a family history of the condition.  · People who have certain medical conditions, such as iron deficiency, kidney disease, Parkinson disease, or nerve damage.  · People who take certain medicines, such as medicines for high blood pressure, nausea, colds, allergies, depression, and some heart conditions.    What are the signs or symptoms?  The main symptom of this  condition is uncomfortable sensations in the legs. These sensations may be:  · Described as pulling, tingling, prickling, throbbing, crawling, or burning.  · Worse while you are sitting or lying down.  · Worse during periods of rest or inactivity.  · Worse at night, often interfering with your sleep.  · Accompanied by a very strong urge to move your legs.  · Temporarily relieved by movement of your legs.    The sensations usually affect both sides of the body. The arms can also be affected, but this is rare. People who have this condition often have tiredness during the day because of their lack of sleep at night.  How is this diagnosed?  This condition may be diagnosed based on your description of the symptoms. You may also have tests, including blood tests, to check for other conditions that may lead to your symptoms. In some cases, you may be asked to spend some time in a sleep lab so your sleeping can be monitored.  How is this treated?  Treatment for this condition is focused on managing the symptoms. Treatment may include:  · Self-help and lifestyle changes.  · Medicines.    Follow these instructions at home:  · Take medicines only as directed by your health care provider.  · Try these methods to get temporary relief from the uncomfortable sensations:  ? Massage your legs.  ? Walk or stretch.  ? Take a cold or hot bath.  · Practice good sleep habits. For example, go to bed and get up at the same time every day.  · Exercise regularly.  · Practice ways of relaxing, such as yoga or meditation.  · Avoid caffeine and alcohol.  · Do not use any tobacco products, including cigarettes, chewing tobacco, or electronic cigarettes. If you need help quitting, ask your health care provider.  · Keep all follow-up visits as directed by your health care provider. This is important.  Contact a health care provider if:  Your symptoms do not improve with treatment, or they get worse.  This information is not intended to replace  advice given to you by your health care provider. Make sure you discuss any questions you have with your health care provider.  Document Released: 12/08/2003 Document Revised: 05/25/2017 Document Reviewed: 12/14/2015  [employer] Interactive Patient Education © 2018 [employer]  Sleep Apnea  Sleep apnea is a condition that affects breathing. People with sleep apnea have moments during sleep when their breathing pauses briefly or gets shallow. Sleep apnea can cause these symptoms:  · Trouble staying asleep.  · Sleepiness or tiredness during the day.  · Irritability.  · Loud snoring.  · Morning headaches.  · Trouble concentrating.  · Forgetting things.  · Less interest in sex.  · Being sleepy for no reason.  · Mood swings.  · Personality changes.  · Depression.  · Waking up a lot during the night to pee (urinate).  · Dry mouth.  · Sore throat.    Follow these instructions at home:  · Make any changes in your routine that your doctor recommends.  · Eat a healthy, well-balanced diet.  · Take over-the-counter and prescription medicines only as told by your doctor.  · Avoid using alcohol, calming medicines (sedatives), and narcotic medicines.  · Take steps to lose weight if you are overweight.  · If you were given a machine (device) to use while you sleep, use it only as told by your doctor.  · Do not use any tobacco products, such as cigarettes, chewing tobacco, and e-cigarettes. If you need help quitting, ask your doctor.  · Keep all follow-up visits as told by your doctor. This is important.  Contact a doctor if:  · The machine that you were given to use during sleep is uncomfortable or does not seem to be working.  · Your symptoms do not get better.  · Your symptoms get worse.  Get help right away if:  · Your chest hurts.  · You have trouble breathing in enough air (shortness of breath).  · You have an uncomfortable feeling in your back, arms, or stomach.  · You have trouble talking.  · One side of your body feels  weak.  · A part of your face is hanging down (drooping).  These symptoms may be an emergency. Do not wait to see if the symptoms will go away. Get medical help right away. Call your local emergency services (911 in the [place] Do not drive yourself to the hospital.  This information is not intended to replace advice given to you by your health care provider. Make sure you discuss any questions you have with your health care provider.  Document Released: 09/26/2009 Document Revised: 08/13/2017 Document Reviewed: 09/26/2016  [employer] Interactive Patient Education © 2018 [employer]

## 2019-02-27 DIAGNOSIS — G47.61 PLMD (PERIODIC LIMB MOVEMENT DISORDER): ICD-10-CM

## 2019-02-27 RX ORDER — ROPINIROLE 0.25 MG/1
TABLET, FILM COATED ORAL
Qty: 60 TABLET | Refills: 0 | OUTPATIENT
Start: 2019-02-27

## 2019-03-11 DIAGNOSIS — G47.61 PLMD (PERIODIC LIMB MOVEMENT DISORDER): ICD-10-CM

## 2019-03-11 RX ORDER — ROPINIROLE 0.25 MG/1
TABLET, FILM COATED ORAL
Qty: 60 TABLET | Refills: 3 | Status: SHIPPED | OUTPATIENT
Start: 2019-03-11 | End: 2019-04-15 | Stop reason: SINTOL

## 2019-03-12 ENCOUNTER — OFFICE VISIT (OUTPATIENT)
Dept: INTERNAL MEDICINE | Facility: CLINIC | Age: 64
End: 2019-03-12

## 2019-03-12 VITALS
HEIGHT: 67 IN | TEMPERATURE: 97.4 F | HEART RATE: 61 BPM | OXYGEN SATURATION: 99 % | WEIGHT: 156.4 LBS | BODY MASS INDEX: 24.55 KG/M2 | DIASTOLIC BLOOD PRESSURE: 68 MMHG | SYSTOLIC BLOOD PRESSURE: 128 MMHG

## 2019-03-12 DIAGNOSIS — R20.0 NUMBNESS OF TOES: ICD-10-CM

## 2019-03-12 DIAGNOSIS — F51.04 PSYCHOPHYSIOLOGICAL INSOMNIA: ICD-10-CM

## 2019-03-12 DIAGNOSIS — E61.1 IRON DEFICIENCY: ICD-10-CM

## 2019-03-12 DIAGNOSIS — K21.9 GASTROESOPHAGEAL REFLUX DISEASE, ESOPHAGITIS PRESENCE NOT SPECIFIED: ICD-10-CM

## 2019-03-12 DIAGNOSIS — Z23 NEED FOR TDAP VACCINATION: ICD-10-CM

## 2019-03-12 DIAGNOSIS — Z85.3 HISTORY OF CANCER OF LEFT BREAST: ICD-10-CM

## 2019-03-12 DIAGNOSIS — E78.00 PURE HYPERCHOLESTEROLEMIA: Primary | ICD-10-CM

## 2019-03-12 DIAGNOSIS — Z79.899 ENCOUNTER FOR LONG-TERM (CURRENT) USE OF MEDICATIONS: ICD-10-CM

## 2019-03-12 DIAGNOSIS — G47.33 MODERATE OBSTRUCTIVE SLEEP APNEA: ICD-10-CM

## 2019-03-12 PROCEDURE — 90471 IMMUNIZATION ADMIN: CPT | Performed by: FAMILY MEDICINE

## 2019-03-12 PROCEDURE — 99214 OFFICE O/P EST MOD 30 MIN: CPT | Performed by: FAMILY MEDICINE

## 2019-03-12 PROCEDURE — 90715 TDAP VACCINE 7 YRS/> IM: CPT | Performed by: FAMILY MEDICINE

## 2019-03-12 NOTE — PROGRESS NOTES
"Subjective   Kaitlynn Le is a 63 y.o. female.     Chief Complaint   Patient presents with   • vitamin b12 deficiency   • Hyperlipidemia     f/u       Visit Vitals  /68   Pulse 61   Temp 97.4 °F (36.3 °C)   Ht 170.2 cm (67\")   Wt 70.9 kg (156 lb 6.4 oz)   SpO2 99%   BMI 24.50 kg/m²         History of Present Illness   Pt has hx of breast cancer and has had her aromasin extended another 5 yrs.     Pt has LAINE and is being treated with  CPAP. Pt is no longer snoring.    Pt has insomnia and is currently on melatonin. She has follow up with sleep clinic tomorrow, but states that the melatonin is not working well.     Pt has hx of hyperlipidemia, currently on diet control.    Pt has iron h/o iron deficiency.   Pt is feeling weak in her legs and has RLS. Pt has new shoes.     Pt has numbness of left 3rd toe after getting new shoes. Tennis shoes cause more numbness. Pt has Podiatry appt with at [facility].     Pt has had her first hep A vaccine.     Pt has reflux symptoms with tomato products.   The following portions of the patient's history were reviewed and updated as appropriate: allergies, current medications, past family history, past medical history, past social history, past surgical history and problem list.    Past Medical History:   Diagnosis Date   • Cancer (CMS/HCC)    • GERD (gastroesophageal reflux disease) 3/12/2019   • History of medical problems July 2018    Vertigo   • History of radiation therapy    • Hyperlipidemia    • Kidney stones 04/13/2017    bilateral   • Osteoarthritis of knee    • Osteopenia of multiple sites 9/19/2018   • Osteoporosis    • Pes planus, flexible    • Tibialis posterior tendinitis    • Urinary tract infection July 2017   • Varicose veins of legs       Past Surgical History:   Procedure Laterality Date   • BREAST RECONSTRUCTION Bilateral 2014   • COLONOSCOPY  [id number]   • CYSTOSCOPY  04/13/2017    right ureteral stone Dr Lofton   • CYSTOSCOPY  12/07/2009 "   • CYSTOSCOPY INSERTION / REMOVAL STENT / STONE  05/01/2009    calcium oxxylate mono   • EXTRACORPOREAL SHOCK WAVE LITHOTRIPSY (ESWL) Left 03/2009   • KNEE ARTHROSCOPY W/ MENISCAL REPAIR Right 2011   • MASTECTOMY MODIFIED RADICAL / SIMPLE / COMPLETE Left 2013    breast cancer   • SIMPLE MASTECTOMY Right 2013    hx breast Ca left   • SKIN BIOPSY  07/2017    mid thoracic back Dr Salinas   • TONSILLECTOMY  09/2015   • WISDOM TOOTH EXTRACTION  2010      Family History   Problem Relation Age of Onset   • Breast cancer Mother    • Mitral valve prolapse Mother    • Heart failure Mother    • Heart disease Mother         Congestive heart failure   • Cancer Mother         Breast cancer   • Mitral valve prolapse Father    • Heart disease Father         Mitral valve replacement   • Cancer Father         Prostate cancer   • Migraines Sister    • Mitral valve prolapse Brother    • Cancer Brother         Prostate cancer   • Heart disease Brother         Mitral valve replacement   • Cancer Other       Social History     Socioeconomic History   • Marital status:      Spouse name: Not on file   • Number of children: Not on file   • Years of education: Not on file   • Highest education level: Not on file   Social Needs   • Financial resource strain: Not on file   • Food insecurity - worry: Not on file   • Food insecurity - inability: Not on file   • Transportation needs - medical: Not on file   • Transportation needs - non-medical: Not on file   Occupational History   • Not on file   Tobacco Use   • Smoking status: Never Smoker   • Smokeless tobacco: Never Used   Substance and Sexual Activity   • Alcohol use: No   • Drug use: No   • Sexual activity: Yes     Partners: Male     Birth control/protection: Post-menopausal     Comment:    Other Topics Concern   • Not on file   Social History Narrative   • Not on file      Allergies   Allergen Reactions   • Caffeine Other (See Comments)     Headaches, speeds up heart rate, gets  acne with chocolate, keeps her from sleeping   • Ibuprofen GI Intolerance   • Adhesive Tape Rash       Review of Systems   Constitutional: Positive for fatigue. Negative for chills, diaphoresis and fever.   HENT: Negative.  Negative for ear pain, nosebleeds, postnasal drip, rhinorrhea, sinus pressure, sneezing and sore throat.    Eyes: Negative.  Negative for redness and itching.   Respiratory: Positive for apnea (on CPAP). Negative for cough, shortness of breath and wheezing.    Cardiovascular: Negative.  Negative for chest pain and palpitations.   Gastrointestinal: Negative.  Negative for abdominal pain, constipation, diarrhea, nausea and vomiting.   Endocrine: Negative.  Negative for cold intolerance and heat intolerance.   Genitourinary: Negative.  Negative for dysuria, frequency, hematuria and urgency.   Musculoskeletal: Negative.  Negative for arthralgias, back pain and neck pain.   Skin: Negative.  Negative for color change and rash.   Allergic/Immunologic: Negative.  Negative for environmental allergies.   Neurological: Positive for numbness (middle toe left). Negative for dizziness, syncope, light-headedness and headaches.   Hematological: Negative.  Negative for adenopathy. Does not bruise/bleed easily.   Psychiatric/Behavioral: Positive for sleep disturbance. Negative for dysphoric mood. The patient is not nervous/anxious.        Objective   Physical Exam   Constitutional: She is oriented to person, place, and time. She appears well-developed.   HENT:   Head: Normocephalic.   Right Ear: External ear normal.   Left Ear: External ear normal.   Nose: Nose normal.   Eyes: Conjunctivae, EOM and lids are normal. Pupils are equal, round, and reactive to light.   Neck: Trachea normal and normal range of motion. Neck supple. Carotid bruit is not present. No thyroid mass and no thyromegaly present.   Cardiovascular: Normal rate and regular rhythm.   No murmur heard.  Pulmonary/Chest: Effort normal and breath sounds  normal. No respiratory distress. She has no decreased breath sounds. She has no wheezes. She has no rhonchi. She has no rales. She exhibits no tenderness.   Abdominal: Soft. Bowel sounds are normal. There is no tenderness.   Musculoskeletal: Normal range of motion.   Neurological: She is alert and oriented to person, place, and time.   Skin: Skin is warm and dry.   Psychiatric: She has a normal mood and affect. Her behavior is normal.   Nursing note and vitals reviewed.      Assessment/Plan   Kaitlynn was seen today for vitamin b12 deficiency and hyperlipidemia.    Diagnoses and all orders for this visit:    Pure hypercholesterolemia  -     Comprehensive Metabolic Panel  -     Lipid Panel    Moderate obstructive sleep apnea    History of cancer of left breast    Psychophysiological insomnia    Iron deficiency  -     CBC & Differential  -     Iron Profile    Numbness of toes  -     TSH  -     Folate  -     Vitamin B12  -     Vitamin B6  -     T4, Free    Gastroesophageal reflux disease, esophagitis presence not specified    Encounter for long-term (current) use of medications  -     Comprehensive Metabolic Panel  -     Lipid Panel  -     TSH  -     CBC & Differential    Need for Tdap vaccination  -     Tdap Vaccine Greater Than or Equal To 6yo IM      Discussed Shingrix vaccine with pt,  that is currently available at the pharmacy.   otc zantac or pepcid. For reflux.              Current Outpatient Medications:   •  aspirin 81 MG chewable tablet, Chew 81 mg Daily., Disp: , Rfl:   •  Calcium Citrate-Vitamin D (CALCIUM CITRATE + D PO), Take  by mouth., Disp: , Rfl:   •  exemestane (AROMASIN) 25 MG chemo tablet, 1 tablet Daily., Disp: , Rfl:   •  metroNIDAZOLE (METROCREAM) 0.75 % cream, As Needed., Disp: , Rfl:   •  nystatin (MYCOSTATIN) [id number] UNIT/GM cream, As Needed., Disp: , Rfl:   •  rOPINIRole (REQUIP) 0.25 MG tablet, 1-2 po qhs, Disp: 60 tablet, Rfl: 3  •  triamcinolone (KENALOG) 0.1 % cream, As Needed., Disp: ,  Rfl:   •  vitamin B-12 (CYANOCOBALAMIN) 500 MCG tablet, Take 500 mcg by mouth Daily., Disp: , Rfl:     Return in about 6 months (around 9/12/2019), or if symptoms worsen or fail to improve, for Annual, Recheck.

## 2019-03-13 ENCOUNTER — OFFICE VISIT (OUTPATIENT)
Dept: SLEEP MEDICINE | Facility: HOSPITAL | Age: 64
End: 2019-03-13

## 2019-03-13 VITALS
BODY MASS INDEX: 24.61 KG/M2 | SYSTOLIC BLOOD PRESSURE: 126 MMHG | HEIGHT: 67 IN | DIASTOLIC BLOOD PRESSURE: 59 MMHG | OXYGEN SATURATION: 98 % | HEART RATE: 69 BPM | WEIGHT: 156.8 LBS

## 2019-03-13 DIAGNOSIS — F51.04 PSYCHOPHYSIOLOGICAL INSOMNIA: Primary | ICD-10-CM

## 2019-03-13 DIAGNOSIS — G47.61 PERIODIC LIMB MOVEMENT DISORDER: ICD-10-CM

## 2019-03-13 PROCEDURE — 99213 OFFICE O/P EST LOW 20 MIN: CPT | Performed by: NURSE PRACTITIONER

## 2019-03-13 RX ORDER — TRAZODONE HYDROCHLORIDE 50 MG/1
50 TABLET ORAL NIGHTLY
Qty: 30 TABLET | Refills: 3 | Status: SHIPPED | OUTPATIENT
Start: 2019-03-13 | End: 2019-03-29 | Stop reason: DRUGHIGH

## 2019-03-13 NOTE — PROGRESS NOTES
Subjective: Follow-up        Chief Complaint:   Chief Complaint   Patient presents with   • Follow-up       HPI:    Kaitlynn Le is a 63 y.o. female here for follow-up of plmd, insomnia.  Patient is doing very well with her leg movements taking her Requip once in the morning and once at night.  She is going to continue at this dose.  Patient's biggest concern today is insomnia.  Patient has trouble going to sleep and staying asleep for several months.  After she awakens in the night she cannot get back to sleep.  She did try 1-2 melatonin at bedtime without relief.  Patient has had over-the-counter Benadryl in the past without relief.  Patient wishes to try prescription medication for her insomnia.        Current medications are:   Current Outpatient Medications:   •  aspirin 81 MG chewable tablet, Chew 81 mg Daily., Disp: , Rfl:   •  Calcium Citrate-Vitamin D (CALCIUM CITRATE + D PO), Take  by mouth., Disp: , Rfl:   •  exemestane (AROMASIN) 25 MG chemo tablet, 1 tablet Daily., Disp: , Rfl:   •  metroNIDAZOLE (METROCREAM) 0.75 % cream, As Needed., Disp: , Rfl:   •  nystatin (MYCOSTATIN) 618205 UNIT/GM cream, As Needed., Disp: , Rfl:   •  rOPINIRole (REQUIP) 0.25 MG tablet, 1-2 po qhs, Disp: 60 tablet, Rfl: 3  •  triamcinolone (KENALOG) 0.1 % cream, As Needed., Disp: , Rfl:   •  vitamin B-12 (CYANOCOBALAMIN) 500 MCG tablet, Take 500 mcg by mouth Daily., Disp: , Rfl:   •  traZODone (DESYREL) 50 MG tablet, Take 1 tablet by mouth Every Night., Disp: 30 tablet, Rfl: 3.      The patient's relevant past medical, surgical, family and social history were reviewed and updated in Epic as appropriate.       Review of Systems   Constitutional: Positive for fatigue.   HENT: Positive for tinnitus.    Eyes: Positive for visual disturbance.   Respiratory: Positive for apnea.    Musculoskeletal: Positive for arthralgias, joint swelling and neck pain.   Psychiatric/Behavioral: Positive for sleep disturbance.   All other systems  reviewed and are negative.        Objective:    Physical Exam   Constitutional: She is oriented to person, place, and time. She appears well-developed and well-nourished.   HENT:   Head: Normocephalic and atraumatic.   Mouth/Throat: Oropharynx is clear and moist.   Eyes: Conjunctivae are normal.   Neck: Neck supple. No thyromegaly present.   Cardiovascular: Normal rate and regular rhythm.   Pulmonary/Chest: Effort normal and breath sounds normal.   Lymphadenopathy:     She has no cervical adenopathy.   Neurological: She is alert and oriented to person, place, and time.   Skin: Skin is warm and dry.   Psychiatric: She has a normal mood and affect. Her behavior is normal. Judgment and thought content normal.   Nursing note and vitals reviewed.        ASSESSMENT/PLAN    Kaitlynn was seen today for follow-up.    Diagnoses and all orders for this visit:    Psychophysiological insomnia  -     traZODone (DESYREL) 50 MG tablet; Take 1 tablet by mouth Every Night.    Periodic limb movement disorder            1. Counseled patient regarding multimodal approach with healthy nutrition, healthy sleep, regular physical activity, social activities, counseling, and medications. Encouraged to practice lateral sleep position. Avoid alcohol and sedatives close to bedtime.  2. Patient has been given prescription for trazodone 50 mg 1 p.o. nightly as needed #30 with 3 refills if patient feels she needs an increased dose she may take 2 of these.   3. Patient is to continue Requip.  4. I have put patient back in 3 months for reassessment.  However, patient does understand that at any time she should need a visit to immediately get her an appointment.  I do not want her to wait 3 months if her trazodone is not working.    I have reviewed the results of my evaluation and impression and discussed my recommendations in detail with the patient.      Signed by  OJ Almendarez    March 13, 2019      CC: Adela Valdez MD          No  ref. provider found

## 2019-03-13 NOTE — PATIENT INSTRUCTIONS
Restless Legs Syndrome  Restless legs syndrome is a condition that causes uncomfortable feelings or sensations in the legs, especially while sitting or lying down. The sensations usually cause an overwhelming urge to move the legs. The arms can also sometimes be affected.  The condition can range from mild to severe. The symptoms often interfere with a person's ability to sleep.  What are the causes?  The cause of this condition is not known.  What increases the risk?  This condition is more likely to develop in:  · People who are older than age 50.  · Pregnant women. In general, restless legs syndrome is more common in women than in men.  · People who have a family history of the condition.  · People who have certain medical conditions, such as iron deficiency, kidney disease, Parkinson disease, or nerve damage.  · People who take certain medicines, such as medicines for high blood pressure, nausea, colds, allergies, depression, and some heart conditions.    What are the signs or symptoms?  The main symptom of this condition is uncomfortable sensations in the legs. These sensations may be:  · Described as pulling, tingling, prickling, throbbing, crawling, or burning.  · Worse while you are sitting or lying down.  · Worse during periods of rest or inactivity.  · Worse at night, often interfering with your sleep.  · Accompanied by a very strong urge to move your legs.  · Temporarily relieved by movement of your legs.    The sensations usually affect both sides of the body. The arms can also be affected, but this is rare. People who have this condition often have tiredness during the day because of their lack of sleep at night.  How is this diagnosed?  This condition may be diagnosed based on your description of the symptoms. You may also have tests, including blood tests, to check for other conditions that may lead to your symptoms. In some cases, you may be asked to spend some time in a sleep lab so your sleeping  can be monitored.  How is this treated?  Treatment for this condition is focused on managing the symptoms. Treatment may include:  · Self-help and lifestyle changes.  · Medicines.    Follow these instructions at home:  · Take medicines only as directed by your health care provider.  · Try these methods to get temporary relief from the uncomfortable sensations:  ? Massage your legs.  ? Walk or stretch.  ? Take a cold or hot bath.  · Practice good sleep habits. For example, go to bed and get up at the same time every day.  · Exercise regularly.  · Practice ways of relaxing, such as yoga or meditation.  · Avoid caffeine and alcohol.  · Do not use any tobacco products, including cigarettes, chewing tobacco, or electronic cigarettes. If you need help quitting, ask your health care provider.  · Keep all follow-up visits as directed by your health care provider. This is important.  Contact a health care provider if:  Your symptoms do not improve with treatment, or they get worse.  This information is not intended to replace advice given to you by your health care provider. Make sure you discuss any questions you have with your health care provider.  Document Released: 12/08/2003 Document Revised: 05/25/2017 Document Reviewed: 12/14/2015  [employer] Interactive Patient Education © 2019 [employer]  Insomnia  Insomnia is a sleep disorder that makes it difficult to fall asleep or to stay asleep. Insomnia can cause tiredness (fatigue), low energy, difficulty concentrating, mood swings, and poor performance at work or school.  There are three different ways to classify insomnia:  · Difficulty falling asleep.  · Difficulty staying asleep.  · Waking up too early in the morning.    Any type of insomnia can be long-term (chronic) or short-term (acute). Both are common. Short-term insomnia usually lasts for three months or less. Chronic insomnia occurs at least three times a week for longer than three months.  What are the  causes?  Insomnia may be caused by another condition, situation, or substance, such as:  · Anxiety.  · Certain medicines.  · Gastroesophageal reflux disease (GERD) or other gastrointestinal conditions.  · Asthma or other breathing conditions.  · Restless legs syndrome, sleep apnea, or other sleep disorders.  · Chronic pain.  · Menopause. This may include hot flashes.  · Stroke.  · Abuse of alcohol, tobacco, or illegal drugs.  · Depression.  · Caffeine.  · Neurological disorders, such as Alzheimer disease.  · An overactive thyroid (hyperthyroidism).    The cause of insomnia may not be known.  What increases the risk?  Risk factors for insomnia include:  · Gender. Women are more commonly affected than men.  · Age. Insomnia is more common as you get older.  · Stress. This may involve your professional or personal life.  · Income. Insomnia is more common in people with lower income.  · Lack of exercise.  · Irregular work schedule or night shifts.  · Traveling between different time zones.    What are the signs or symptoms?  If you have insomnia, trouble falling asleep or trouble staying asleep is the main symptom. This may lead to other symptoms, such as:  · Feeling fatigued.  · Feeling nervous about going to sleep.  · Not feeling rested in the morning.  · Having trouble concentrating.  · Feeling irritable, anxious, or depressed.    How is this treated?  Treatment for insomnia depends on the cause. If your insomnia is caused by an underlying condition, treatment will focus on addressing the condition. Treatment may also include:  · Medicines to help you sleep.  · Counseling or therapy.  · Lifestyle adjustments.    Follow these instructions at home:  · Take medicines only as directed by your health care provider.  · Keep regular sleeping and waking hours. Avoid naps.  · Keep a sleep diary to help you and your health care provider figure out what could be causing your insomnia. Include:  ? When you sleep.  ? When you wake  up during the night.  ? How well you sleep.  ? How rested you feel the next day.  ? Any side effects of medicines you are taking.  ? What you eat and drink.  · Make your bedroom a comfortable place where it is easy to fall asleep:  ? Put up shades or special blackout curtains to block light from outside.  ? Use a white noise machine to block noise.  ? Keep the temperature cool.  · Exercise regularly as directed by your health care provider. Avoid exercising right before bedtime.  · Use relaxation techniques to manage stress. Ask your health care provider to suggest some techniques that may work well for you. These may include:  ? Breathing exercises.  ? Routines to release muscle tension.  ? Visualizing peaceful scenes.  · Cut back on alcohol, caffeinated beverages, and cigarettes, especially close to bedtime. These can disrupt your sleep.  · Do not overeat or eat spicy foods right before bedtime. This can lead to digestive discomfort that can make it hard for you to sleep.  · Limit screen use before bedtime. This includes:  ? Watching TV.  ? Using your smartphone, tablet, and computer.  · Stick to a routine. This can help you fall asleep faster. Try to do a quiet activity, brush your teeth, and go to bed at the same time each night.  · Get out of bed if you are still awake after 15 minutes of trying to sleep. Keep the lights down, but try reading or doing a quiet activity. When you feel sleepy, go back to bed.  · Make sure that you drive carefully. Avoid driving if you feel very sleepy.  · Keep all follow-up appointments as directed by your health care provider. This is important.  Contact a health care provider if:  · You are tired throughout the day or have trouble in your daily routine due to sleepiness.  · You continue to have sleep problems or your sleep problems get worse.  Get help right away if:  · You have serious thoughts about hurting yourself or someone else.  This information is not intended to replace  advice given to you by your health care provider. Make sure you discuss any questions you have with your health care provider.  Document Released: 12/15/2001 Document Revised: 05/19/2017 Document Reviewed: 09/18/2015  [employer] Interactive Patient Education © 2018 [employer]

## 2019-03-15 DIAGNOSIS — D72.819 LEUKOPENIA, UNSPECIFIED TYPE: Primary | ICD-10-CM

## 2019-03-15 LAB
ALBUMIN SERPL-MCNC: 4.72 G/DL (ref 3.2–4.8)
ALBUMIN/GLOB SERPL: 2.1 G/DL (ref 1.5–2.5)
ALP SERPL-CCNC: 88 U/L (ref 25–100)
ALT SERPL-CCNC: 21 U/L (ref 7–40)
AST SERPL-CCNC: 23 U/L (ref 0–33)
BASOPHILS # BLD AUTO: 0.03 10*3/MM3 (ref 0–0.2)
BASOPHILS NFR BLD AUTO: 1 % (ref 0–1)
BILIRUB SERPL-MCNC: 0.6 MG/DL (ref 0.3–1.2)
BUN SERPL-MCNC: 15 MG/DL (ref 9–23)
BUN/CREAT SERPL: 20 (ref 7–25)
CALCIUM SERPL-MCNC: 9.6 MG/DL (ref 8.7–10.4)
CHLORIDE SERPL-SCNC: 105 MMOL/L (ref 99–109)
CHOLEST SERPL-MCNC: 242 MG/DL (ref 0–200)
CO2 SERPL-SCNC: 28 MMOL/L (ref 20–31)
CREAT SERPL-MCNC: 0.75 MG/DL (ref 0.6–1.3)
EOSINOPHIL # BLD AUTO: 0.09 10*3/MM3 (ref 0–0.3)
EOSINOPHIL NFR BLD AUTO: 3.1 % (ref 0–3)
ERYTHROCYTE [DISTWIDTH] IN BLOOD BY AUTOMATED COUNT: 13 % (ref 11.3–14.5)
FOLATE SERPL-MCNC: >24 NG/ML (ref 3.2–20)
GLOBULIN SER CALC-MCNC: 2.3 GM/DL
GLUCOSE SERPL-MCNC: 85 MG/DL (ref 70–100)
HCT VFR BLD AUTO: 42.1 % (ref 34.5–44)
HDLC SERPL-MCNC: 87 MG/DL (ref 40–60)
HGB BLD-MCNC: 13.7 G/DL (ref 11.5–15.5)
IMM GRANULOCYTES # BLD AUTO: 0.01 10*3/MM3 (ref 0–0.05)
IMM GRANULOCYTES NFR BLD AUTO: 0.3 % (ref 0–0.6)
IRON SATN MFR SERPL: 30 % (ref 15–50)
IRON SERPL-MCNC: 101 MCG/DL (ref 50–175)
LDLC SERPL CALC-MCNC: 146 MG/DL (ref 0–100)
LYMPHOCYTES # BLD AUTO: 0.99 10*3/MM3 (ref 0.6–4.8)
LYMPHOCYTES NFR BLD AUTO: 34 % (ref 24–44)
MCH RBC QN AUTO: 32.2 PG (ref 27–31)
MCHC RBC AUTO-ENTMCNC: 32.5 G/DL (ref 32–36)
MCV RBC AUTO: 99.1 FL (ref 80–99)
MONOCYTES # BLD AUTO: 0.32 10*3/MM3 (ref 0–1)
MONOCYTES NFR BLD AUTO: 11 % (ref 0–12)
NEUTROPHILS # BLD AUTO: 1.47 10*3/MM3 (ref 1.5–8.3)
NEUTROPHILS NFR BLD AUTO: 50.6 % (ref 41–71)
PLATELET # BLD AUTO: 217 10*3/MM3 (ref 150–450)
POTASSIUM SERPL-SCNC: 3.8 MMOL/L (ref 3.5–5.5)
PROT SERPL-MCNC: 7 G/DL (ref 5.7–8.2)
RBC # BLD AUTO: 4.25 10*6/MM3 (ref 3.89–5.14)
SODIUM SERPL-SCNC: 140 MMOL/L (ref 132–146)
T4 FREE SERPL-MCNC: 1.19 NG/DL (ref 0.89–1.76)
TIBC SERPL-MCNC: 334 MCG/DL (ref 250–450)
TRIGL SERPL-MCNC: 44 MG/DL (ref 0–150)
TSH SERPL DL<=0.005 MIU/L-ACNC: 2.88 MIU/ML (ref 0.35–5.35)
UIBC SERPL-MCNC: 233 MCG/DL
VIT B12 SERPL-MCNC: 1205 PG/ML (ref 211–911)
VIT B6 SERPL-MCNC: 13.6 UG/L (ref 2–32.8)
VLDLC SERPL CALC-MCNC: 8.8 MG/DL
WBC # BLD AUTO: 2.91 10*3/MM3 (ref 3.5–10.8)

## 2019-03-20 ENCOUNTER — TELEPHONE (OUTPATIENT)
Dept: INTERNAL MEDICINE | Facility: CLINIC | Age: 64
End: 2019-03-20

## 2019-03-20 NOTE — TELEPHONE ENCOUNTER
----- Message from Adela LIN MD sent at 3/15/2019  9:53 PM EDT -----  Please call the patient regarding her abnormal result. Low white cell count, recheck 1-2 weeks. I have already ordered this as a future order in the computer.  LDL staying high, but better. If she is already following low animal fat, low sugar, low alcohol diet, consider adding statins,

## 2019-03-25 ENCOUNTER — OFFICE VISIT (OUTPATIENT)
Dept: INTERNAL MEDICINE | Facility: CLINIC | Age: 64
End: 2019-03-25

## 2019-03-25 VITALS
SYSTOLIC BLOOD PRESSURE: 138 MMHG | TEMPERATURE: 97.8 F | DIASTOLIC BLOOD PRESSURE: 62 MMHG | OXYGEN SATURATION: 99 % | HEIGHT: 67 IN | WEIGHT: 155.6 LBS | HEART RATE: 70 BPM | BODY MASS INDEX: 24.42 KG/M2 | RESPIRATION RATE: 16 BRPM

## 2019-03-25 DIAGNOSIS — N30.01 ACUTE CYSTITIS WITH HEMATURIA: Primary | ICD-10-CM

## 2019-03-25 LAB
BASOPHILS # BLD AUTO: 0.01 10*3/MM3 (ref 0–0.2)
BASOPHILS NFR BLD AUTO: 0.2 % (ref 0–1)
BILIRUB BLD-MCNC: NEGATIVE MG/DL
CLARITY, POC: ABNORMAL
COLOR UR: YELLOW
DEPRECATED RDW RBC AUTO: 47.6 FL (ref 37–54)
EOSINOPHIL # BLD AUTO: 0.07 10*3/MM3 (ref 0–0.3)
EOSINOPHIL NFR BLD AUTO: 1.2 % (ref 0–3)
ERYTHROCYTE [DISTWIDTH] IN BLOOD BY AUTOMATED COUNT: 13 % (ref 11.3–14.5)
GLUCOSE UR STRIP-MCNC: NEGATIVE MG/DL
HCT VFR BLD AUTO: 39.7 % (ref 34.5–44)
HGB BLD-MCNC: 12.7 G/DL (ref 11.5–15.5)
IMM GRANULOCYTES # BLD AUTO: 0.01 10*3/MM3 (ref 0–0.05)
IMM GRANULOCYTES NFR BLD AUTO: 0.2 % (ref 0–0.6)
KETONES UR QL: NEGATIVE
LEUKOCYTE EST, POC: ABNORMAL
LYMPHOCYTES # BLD AUTO: 1.3 10*3/MM3 (ref 0.6–4.8)
LYMPHOCYTES NFR BLD AUTO: 22.8 % (ref 24–44)
MCH RBC QN AUTO: 32 PG (ref 27–31)
MCHC RBC AUTO-ENTMCNC: 32 G/DL (ref 32–36)
MCV RBC AUTO: 100 FL (ref 80–99)
MONOCYTES # BLD AUTO: 0.36 10*3/MM3 (ref 0–1)
MONOCYTES NFR BLD AUTO: 6.3 % (ref 0–12)
NEUTROPHILS # BLD AUTO: 3.97 10*3/MM3 (ref 1.5–8.3)
NEUTROPHILS NFR BLD AUTO: 69.5 % (ref 41–71)
NITRITE UR-MCNC: NEGATIVE MG/ML
PH UR: 8.5 [PH] (ref 5–8)
PLATELET # BLD AUTO: 227 10*3/MM3 (ref 150–450)
PMV BLD AUTO: 11.2 FL (ref 6–12)
PROT UR STRIP-MCNC: ABNORMAL MG/DL
RBC # BLD AUTO: 3.97 10*6/MM3 (ref 3.89–5.14)
RBC # UR STRIP: ABNORMAL /UL
SP GR UR: 1.01 (ref 1–1.03)
UROBILINOGEN UR QL: NORMAL
WBC NRBC COR # BLD: 5.71 10*3/MM3 (ref 3.5–10.8)

## 2019-03-25 PROCEDURE — 36415 COLL VENOUS BLD VENIPUNCTURE: CPT | Performed by: NURSE PRACTITIONER

## 2019-03-25 PROCEDURE — 87077 CULTURE AEROBIC IDENTIFY: CPT | Performed by: NURSE PRACTITIONER

## 2019-03-25 PROCEDURE — 87086 URINE CULTURE/COLONY COUNT: CPT | Performed by: NURSE PRACTITIONER

## 2019-03-25 PROCEDURE — 99214 OFFICE O/P EST MOD 30 MIN: CPT | Performed by: NURSE PRACTITIONER

## 2019-03-25 PROCEDURE — 81003 URINALYSIS AUTO W/O SCOPE: CPT | Performed by: NURSE PRACTITIONER

## 2019-03-25 PROCEDURE — 85025 COMPLETE CBC W/AUTO DIFF WBC: CPT | Performed by: NURSE PRACTITIONER

## 2019-03-25 PROCEDURE — 87186 SC STD MICRODIL/AGAR DIL: CPT | Performed by: NURSE PRACTITIONER

## 2019-03-25 RX ORDER — CIPROFLOXACIN 500 MG/1
500 TABLET, FILM COATED ORAL 2 TIMES DAILY
Qty: 14 TABLET | Refills: 0 | Status: SHIPPED | OUTPATIENT
Start: 2019-03-25 | End: 2019-04-15

## 2019-03-25 RX ORDER — PHENAZOPYRIDINE HYDROCHLORIDE 100 MG/1
100 TABLET, FILM COATED ORAL 3 TIMES DAILY PRN
Qty: 15 TABLET | Refills: 0 | Status: SHIPPED | OUTPATIENT
Start: 2019-03-25 | End: 2019-06-13

## 2019-03-27 LAB — BACTERIA SPEC AEROBE CULT: ABNORMAL

## 2019-03-28 ENCOUNTER — TELEPHONE (OUTPATIENT)
Dept: INTERNAL MEDICINE | Facility: CLINIC | Age: 64
End: 2019-03-28

## 2019-03-28 NOTE — TELEPHONE ENCOUNTER
----- Message from OJ Mcclain sent at 3/27/2019 11:05 PM EDT -----  Urine culture was +, but should be responding to the antibiotic    CBC looks MUCH better!

## 2019-03-29 ENCOUNTER — TELEPHONE (OUTPATIENT)
Dept: SLEEP MEDICINE | Facility: HOSPITAL | Age: 64
End: 2019-03-29

## 2019-03-29 RX ORDER — TRAZODONE HYDROCHLORIDE 150 MG/1
150 TABLET ORAL NIGHTLY
Qty: 30 TABLET | Refills: 6 | Status: SHIPPED | OUTPATIENT
Start: 2019-03-29 | End: 2019-06-13

## 2019-03-29 NOTE — TELEPHONE ENCOUNTER
PT HAS ?'S ABOUT MEDICATION BOTH IN QUANTITY AND REFILLS. SHE IS WANTING TO KNOW IF SHE NEEDS TO UP MEDICATION OR IF ANOTHER RX NEEDS SUBMITTED FOR THAT

## 2019-03-29 NOTE — TELEPHONE ENCOUNTER
CALLED PATIENT AND ADVISED SCRIPT SENT TO PHARMACY    TRAZADONE 150 MG 1 TAB NIGHTLY    PATIENT VERBALIZED UNDERSTANDING

## 2019-03-29 NOTE — TELEPHONE ENCOUNTER
Patient states that she takes  Two tablets of trazodone a night and thinks that if she takes 3, she will sleep through the night. She was wondering if there was a stronger dose or if she could get the directions changed so that she doesn't keep going through the bottle.

## 2019-04-03 ENCOUNTER — TELEPHONE (OUTPATIENT)
Dept: INTERNAL MEDICINE | Facility: CLINIC | Age: 64
End: 2019-04-03

## 2019-04-03 NOTE — TELEPHONE ENCOUNTER
MSLexie PAYANEY SAYS SHE HAS FINISHED RX FOR UTI, BUT DOES NOT THINK THE PROBLEM HAS BEEN SOLVED. PLEASE ADVISE.

## 2019-04-08 ENCOUNTER — TELEPHONE (OUTPATIENT)
Dept: SLEEP MEDICINE | Facility: HOSPITAL | Age: 64
End: 2019-04-08

## 2019-04-08 NOTE — TELEPHONE ENCOUNTER
PT STATES SHE IS HAVING SIDE EFFECTS OF UTI AND ALSO NUMBNESS IN LEGS FROM BEING ON MEDICATION FOR RESTLESS LEG, ROPINIROLE MEDICATION. PT WANTS TO KNOW IF SHE SHOULD STOP TAKING

## 2019-04-15 ENCOUNTER — OFFICE VISIT (OUTPATIENT)
Dept: INTERNAL MEDICINE | Facility: CLINIC | Age: 64
End: 2019-04-15

## 2019-04-15 VITALS
WEIGHT: 154.8 LBS | BODY MASS INDEX: 24.3 KG/M2 | OXYGEN SATURATION: 99 % | SYSTOLIC BLOOD PRESSURE: 116 MMHG | DIASTOLIC BLOOD PRESSURE: 76 MMHG | HEIGHT: 67 IN | TEMPERATURE: 98.2 F | HEART RATE: 64 BPM

## 2019-04-15 DIAGNOSIS — R39.9 UTI SYMPTOMS: Primary | ICD-10-CM

## 2019-04-15 LAB
BILIRUB BLD-MCNC: NEGATIVE MG/DL
CLARITY, POC: CLEAR
COLOR UR: YELLOW
GLUCOSE UR STRIP-MCNC: NEGATIVE MG/DL
KETONES UR QL: NEGATIVE
LEUKOCYTE EST, POC: NEGATIVE
NITRITE UR-MCNC: NEGATIVE MG/ML
PH UR: 7.5 [PH] (ref 5–8)
PROT UR STRIP-MCNC: NEGATIVE MG/DL
RBC # UR STRIP: NEGATIVE /UL
SP GR UR: 1.01 (ref 1–1.03)
UROBILINOGEN UR QL: NORMAL

## 2019-04-15 PROCEDURE — 99213 OFFICE O/P EST LOW 20 MIN: CPT | Performed by: FAMILY MEDICINE

## 2019-04-15 PROCEDURE — 81003 URINALYSIS AUTO W/O SCOPE: CPT | Performed by: FAMILY MEDICINE

## 2019-04-15 RX ORDER — NITROFURANTOIN 25; 75 MG/1; MG/1
100 CAPSULE ORAL 2 TIMES DAILY
Qty: 20 CAPSULE | Refills: 0 | Status: SHIPPED | OUTPATIENT
Start: 2019-04-15 | End: 2019-06-13

## 2019-04-15 NOTE — PROGRESS NOTES
"Subjective   Kaitlynn Le is a 63 y.o. female.     Chief Complaint   Patient presents with   • Urinary Tract Infection     she has discontinued ropinirole, she read side effects and this could cause uti's        Visit Vitals  /76   Pulse 64   Temp 98.2 °F (36.8 °C)   Ht 170.2 cm (67.01\")   Wt 70.2 kg (154 lb 12.8 oz)   SpO2 99%   BMI 24.24 kg/m²         Urinary Tract Infection    This is a recurrent problem. The current episode started 1 to 4 weeks ago. The problem occurs every urination. The problem has been unchanged (did not change after cipro). The quality of the pain is described as aching. The patient is experiencing no pain. There has been no fever. Associated symptoms include frequency and urgency. Pertinent negatives include no chills, discharge, flank pain, hematuria, hesitancy, nausea, possible pregnancy, sweats or vomiting. She has tried antibiotics for the symptoms. The treatment provided no relief. Her past medical history is significant for recurrent UTIs.      Pt has occasional stinging and urgency of urination.   Pt stopped ropinirole because of numbness and uti symptoms and the numbness has stopped.     Pt has appt with Urology for incontinence.   The following portions of the patient's history were reviewed and updated as appropriate: allergies, current medications, past family history, past medical history, past social history, past surgical history and problem list.    Past Medical History:   Diagnosis Date   • Cancer (CMS/[facility])    • GERD (gastroesophageal reflux disease) 3/12/2019   • History of medical problems July 2018    Vertigo   • History of radiation therapy    • Hyperlipidemia    • Kidney stones 04/13/2017    bilateral   • Osteoarthritis of knee    • Osteopenia of multiple sites 9/19/2018   • Osteoporosis    • Pes planus, flexible    • Tibialis posterior tendinitis    • Urinary tract infection July 2017   • Varicose veins of legs       Past Surgical History:   Procedure " Laterality Date   • BREAST RECONSTRUCTION Bilateral 2014   • COLONOSCOPY  2/518500   • CYSTOSCOPY  04/13/2017    right ureteral stone Dr Lofton   • CYSTOSCOPY  12/07/2009   • CYSTOSCOPY INSERTION / REMOVAL STENT / STONE  05/01/2009    calcium oxxylate mono   • EXTRACORPOREAL SHOCK WAVE LITHOTRIPSY (ESWL) Left 03/2009   • KNEE ARTHROSCOPY W/ MENISCAL REPAIR Right 2011   • MASTECTOMY MODIFIED RADICAL / SIMPLE / COMPLETE Left 2013    breast cancer   • SIMPLE MASTECTOMY Right 2013    hx breast Ca left   • SKIN BIOPSY  07/2017    mid thoracic back Dr Salinas   • TONSILLECTOMY  09/2015   • WISDOM TOOTH EXTRACTION  2010      Family History   Problem Relation Age of Onset   • Breast cancer Mother    • Mitral valve prolapse Mother    • Heart failure Mother    • Heart disease Mother         Congestive heart failure   • Cancer Mother         Breast cancer   • Mitral valve prolapse Father    • Heart disease Father         Mitral valve replacement   • Cancer Father         Prostate cancer   • Migraines Sister    • Mitral valve prolapse Brother    • Cancer Brother         Prostate cancer   • Heart disease Brother         Mitral valve replacement   • Cancer Other       Social History     Socioeconomic History   • Marital status:      Spouse name: Not on file   • Number of children: Not on file   • Years of education: Not on file   • Highest education level: Not on file   Tobacco Use   • Smoking status: Never Smoker   • Smokeless tobacco: Never Used   Substance and Sexual Activity   • Alcohol use: No   • Drug use: No   • Sexual activity: Yes     Partners: Male     Birth control/protection: Post-menopausal     Comment:       Allergies   Allergen Reactions   • Caffeine Other (See Comments)     Headaches, speeds up heart rate, gets acne with chocolate, keeps her from sleeping   • Ibuprofen GI Intolerance   • Adhesive Tape Rash       Review of Systems   Constitutional: Negative.  Negative for chills, diaphoresis, fatigue  and fever.   HENT: Positive for rhinorrhea. Negative for ear pain, nosebleeds, postnasal drip, sinus pressure, sneezing and sore throat.    Eyes: Negative.  Negative for redness and itching.   Respiratory: Negative.  Negative for cough, shortness of breath and wheezing.    Cardiovascular: Negative.  Negative for chest pain and palpitations.   Gastrointestinal: Negative.  Negative for abdominal pain, constipation, diarrhea, nausea and vomiting.   Endocrine: Negative.  Negative for cold intolerance and heat intolerance.   Genitourinary: Positive for dysuria (mild), frequency and urgency. Negative for flank pain, hematuria and hesitancy.   Musculoskeletal: Negative.  Negative for arthralgias, back pain and neck pain.   Skin: Negative.  Negative for color change and rash.   Allergic/Immunologic: Positive for environmental allergies.   Neurological: Negative.  Negative for dizziness, syncope, light-headedness and headaches.   Hematological: Negative.  Negative for adenopathy. Does not bruise/bleed easily.   Psychiatric/Behavioral: Negative.  Negative for dysphoric mood. The patient is not nervous/anxious.        Objective   Physical Exam   Constitutional: She is oriented to person, place, and time. She appears well-developed.   HENT:   Head: Normocephalic.   Right Ear: External ear normal.   Left Ear: External ear normal.   Nose: Nose normal.   Eyes: Conjunctivae, EOM and lids are normal. Pupils are equal, round, and reactive to light.   Neck: Trachea normal and normal range of motion. Neck supple. Carotid bruit is not present. No thyroid mass and no thyromegaly present.   Cardiovascular: Normal rate and regular rhythm.   No murmur heard.  Pulmonary/Chest: Effort normal and breath sounds normal. No respiratory distress. She has no decreased breath sounds. She has no wheezes. She has no rhonchi. She has no rales. She exhibits no tenderness.   Abdominal: Soft. Bowel sounds are normal. There is no tenderness. There is no  CVA tenderness.   Musculoskeletal: Normal range of motion.   Neurological: She is alert and oriented to person, place, and time.   Skin: Skin is warm and dry.   Psychiatric: She has a normal mood and affect. Her behavior is normal.   Nursing note and vitals reviewed.      Assessment/Plan   Kaitlynn was seen today for urinary tract infection.    Diagnoses and all orders for this visit:    UTI symptoms  -     POCT urinalysis dipstick, automated  -     Urine Culture - Urine, Urine, Clean Catch    Other orders  -     nitrofurantoin, macrocrystal-monohydrate, (MACROBID) 100 MG capsule; Take 1 capsule by mouth 2 (Two) Times a Day.      Discussed Shingrix vaccine with pt,  that is currently available at the pharmacy.                Current Outpatient Medications:   •  aspirin 81 MG chewable tablet, Chew 81 mg Daily., Disp: , Rfl:   •  Calcium Citrate-Vitamin D (CALCIUM CITRATE + D PO), Take  by mouth., Disp: , Rfl:   •  exemestane (AROMASIN) 25 MG chemo tablet, 1 tablet Daily., Disp: , Rfl:   •  metroNIDAZOLE (METROCREAM) 0.75 % cream, As Needed., Disp: , Rfl:   •  nystatin (MYCOSTATIN) 312348 UNIT/GM cream, As Needed., Disp: , Rfl:   •  phenazopyridine (PYRIDIUM) 100 MG tablet, Take 1 tablet by mouth 3 (Three) Times a Day As Needed for bladder spasms., Disp: 15 tablet, Rfl: 0  •  traZODone (DESYREL) 150 MG tablet, Take 1 tablet by mouth Every Night., Disp: 30 tablet, Rfl: 6  •  triamcinolone (KENALOG) 0.1 % cream, As Needed., Disp: , Rfl:   •  vitamin B-12 (CYANOCOBALAMIN) 500 MCG tablet, Take 500 mcg by mouth Daily., Disp: , Rfl:   •  nitrofurantoin, macrocrystal-monohydrate, (MACROBID) 100 MG capsule, Take 1 capsule by mouth 2 (Two) Times a Day., Disp: 20 capsule, Rfl: 0    Return in about 5 months (around 9/11/2019), or if symptoms worsen or fail to improve, for Next scheduled follow up, Recheck.    Recent Results (from the past 168 hour(s))   POCT urinalysis dipstick, automated    Collection Time: 04/15/19 11:23 AM    Result Value Ref Range    Color Yellow Yellow, Straw, Dark Yellow, Candice    Clarity, UA Clear Clear    Specific Gravity  1.010 1.005 - 1.030    pH, Urine 7.5 5.0 - 8.0    Leukocytes Negative Negative    Nitrite, UA Negative Negative    Protein, POC Negative Negative mg/dL    Glucose, UA Negative Negative, 1000 mg/dL (3+) mg/dL    Ketones, UA Negative Negative    Urobilinogen, UA Normal Normal    Bilirubin Negative Negative    Blood, UA Negative Negative

## 2019-04-17 LAB
BACTERIA UR CULT: NORMAL
BACTERIA UR CULT: NORMAL

## 2019-06-13 ENCOUNTER — OFFICE VISIT (OUTPATIENT)
Dept: SLEEP MEDICINE | Facility: HOSPITAL | Age: 64
End: 2019-06-13

## 2019-06-13 VITALS
HEART RATE: 59 BPM | SYSTOLIC BLOOD PRESSURE: 126 MMHG | WEIGHT: 153.4 LBS | HEIGHT: 67 IN | DIASTOLIC BLOOD PRESSURE: 66 MMHG | OXYGEN SATURATION: 98 % | BODY MASS INDEX: 24.08 KG/M2

## 2019-06-13 DIAGNOSIS — G47.33 OSA (OBSTRUCTIVE SLEEP APNEA): Primary | ICD-10-CM

## 2019-06-13 DIAGNOSIS — F51.04 PSYCHOPHYSIOLOGICAL INSOMNIA: ICD-10-CM

## 2019-06-13 PROCEDURE — 99213 OFFICE O/P EST LOW 20 MIN: CPT | Performed by: NURSE PRACTITIONER

## 2019-06-13 RX ORDER — ZOLPIDEM TARTRATE 5 MG/1
5 TABLET ORAL NIGHTLY PRN
Qty: 30 TABLET | Refills: 0 | Status: SHIPPED | OUTPATIENT
Start: 2019-06-13 | End: 2019-07-02 | Stop reason: ALTCHOICE

## 2019-06-13 NOTE — PROGRESS NOTES
Subjective: Follow-up        Chief Complaint:   Chief Complaint   Patient presents with   • Follow-up       HPI:    Kaitlynn Le is a 63 y.o. female here for follow-up of sleep apnea and insomnia.  She was last seen 3/13/2019.  Patient has trouble going to sleep and staying asleep.  Patient has tried REM pressure in the past, melatonin, and Benadryl and had no relief.  She is also taken the [facility] [employer] course on insomnia and this did not help as well.  She was started on trazodone 50 mg and that was gradually increased to 150 mg over a couple of weeks.  This is no longer working for patient.  Patient is back where she started as far as her symptoms are concerned.  Patient is amendable to trying a different medication.  Patient has good use of her CPAP.  She is sleeping 4 to 5 hours nightly and feels refreshed upon awakening if she is able to get sleep the night before.  Patient has an Darby scale of 12/24.  Patient wishes to continue CPAP.        Current medications are:   Current Outpatient Medications:   •  aspirin 81 MG chewable tablet, Chew 81 mg Daily., Disp: , Rfl:   •  Calcium Citrate-Vitamin D (CALCIUM CITRATE + D PO), Take  by mouth., Disp: , Rfl:   •  exemestane (AROMASIN) 25 MG chemo tablet, 1 tablet Daily., Disp: , Rfl:   •  metroNIDAZOLE (METROCREAM) 0.75 % cream, As Needed., Disp: , Rfl:   •  nystatin (MYCOSTATIN) 409364 UNIT/GM cream, As Needed., Disp: , Rfl:   •  triamcinolone (KENALOG) 0.1 % cream, As Needed., Disp: , Rfl:   •  vitamin B-12 (CYANOCOBALAMIN) 500 MCG tablet, Take 500 mcg by mouth Daily., Disp: , Rfl:   •  zolpidem (AMBIEN) 5 MG tablet, Take 1 tablet by mouth At Night As Needed for Sleep., Disp: 30 tablet, Rfl: 0.      The patient's relevant past medical, surgical, family and social history were reviewed and updated in Epic as appropriate.       Review of Systems   Constitutional: Positive for fatigue.   HENT: Positive for tinnitus.    Eyes: Positive for visual  disturbance.   Respiratory: Positive for apnea.    Musculoskeletal: Positive for arthralgias, joint swelling and neck pain.   Psychiatric/Behavioral: Positive for sleep disturbance.   All other systems reviewed and are negative.        Objective:    Physical Exam   Constitutional: She is oriented to person, place, and time. She appears well-developed and well-nourished.   HENT:   Head: Normocephalic and atraumatic.   Mouth/Throat: Oropharynx is clear and moist.   CLASS 3 AIRWAY   Eyes: Conjunctivae are normal.   Neck: Neck supple.   Cardiovascular: Normal rate and regular rhythm.   Pulmonary/Chest: Effort normal and breath sounds normal.   Neurological: She is alert and oriented to person, place, and time.   Skin: Skin is warm and dry.   Psychiatric: She has a normal mood and affect. Her behavior is normal. Judgment and thought content normal.   Nursing note and vitals reviewed.    90/90 days of use.  Greater than 4-hour use 96.7.  90% pressure 11.2.  AHI of 1.2.  Download reviewed with patient.    ASSESSMENT/PLAN    Kaitlynn was seen today for follow-up.    Diagnoses and all orders for this visit:    LAINE (obstructive sleep apnea)  -     CPAP Therapy    Psychophysiological insomnia  -     zolpidem (AMBIEN) 5 MG tablet; Take 1 tablet by mouth At Night As Needed for Sleep.            1. Counseled patient regarding multimodal approach with healthy nutrition, healthy sleep, regular physical activity, social activities, counseling, and medications. Encouraged to practice lateral sleep position. Avoid alcohol and sedatives close to bedtime.  2. Refill supplies x1 year.  3. Ambien 5 mg 1 p.o. nightly as needed #30 with no refills given.  Patient will return to clinic in 4 weeks to reassess.  Shivam compliant.    I have reviewed the results of my evaluation and impression and discussed my recommendations in detail with the patient.      Signed by  Maggy Carr, APRN    June 13, 2019      CC: Adela Valdez MD           No ref. provider found

## 2019-06-13 NOTE — PATIENT INSTRUCTIONS
Insomnia  Insomnia is a sleep disorder that makes it difficult to fall asleep or to stay asleep. Insomnia can cause tiredness (fatigue), low energy, difficulty concentrating, mood swings, and poor performance at work or school.  There are three different ways to classify insomnia:  · Difficulty falling asleep.  · Difficulty staying asleep.  · Waking up too early in the morning.    Any type of insomnia can be long-term (chronic) or short-term (acute). Both are common. Short-term insomnia usually lasts for three months or less. Chronic insomnia occurs at least three times a week for longer than three months.  What are the causes?  Insomnia may be caused by another condition, situation, or substance, such as:  · Anxiety.  · Certain medicines.  · Gastroesophageal reflux disease (GERD) or other gastrointestinal conditions.  · Asthma or other breathing conditions.  · Restless legs syndrome, sleep apnea, or other sleep disorders.  · Chronic pain.  · Menopause. This may include hot flashes.  · Stroke.  · Abuse of alcohol, tobacco, or illegal drugs.  · Depression.  · Caffeine.  · Neurological disorders, such as Alzheimer disease.  · An overactive thyroid (hyperthyroidism).    The cause of insomnia may not be known.  What increases the risk?  Risk factors for insomnia include:  · Gender. Women are more commonly affected than men.  · Age. Insomnia is more common as you get older.  · Stress. This may involve your professional or personal life.  · Income. Insomnia is more common in people with lower income.  · Lack of exercise.  · Irregular work schedule or night shifts.  · Traveling between different time zones.    What are the signs or symptoms?  If you have insomnia, trouble falling asleep or trouble staying asleep is the main symptom. This may lead to other symptoms, such as:  · Feeling fatigued.  · Feeling nervous about going to sleep.  · Not feeling rested in the morning.  · Having trouble concentrating.  · Feeling  irritable, anxious, or depressed.    How is this treated?  Treatment for insomnia depends on the cause. If your insomnia is caused by an underlying condition, treatment will focus on addressing the condition. Treatment may also include:  · Medicines to help you sleep.  · Counseling or therapy.  · Lifestyle adjustments.    Follow these instructions at home:  · Take medicines only as directed by your health care provider.  · Keep regular sleeping and waking hours. Avoid naps.  · Keep a sleep diary to help you and your health care provider figure out what could be causing your insomnia. Include:  ? When you sleep.  ? When you wake up during the night.  ? How well you sleep.  ? How rested you feel the next day.  ? Any side effects of medicines you are taking.  ? What you eat and drink.  · Make your bedroom a comfortable place where it is easy to fall asleep:  ? Put up shades or special blackout curtains to block light from outside.  ? Use a white noise machine to block noise.  ? Keep the temperature cool.  · Exercise regularly as directed by your health care provider. Avoid exercising right before bedtime.  · Use relaxation techniques to manage stress. Ask your health care provider to suggest some techniques that may work well for you. These may include:  ? Breathing exercises.  ? Routines to release muscle tension.  ? Visualizing peaceful scenes.  · Cut back on alcohol, caffeinated beverages, and cigarettes, especially close to bedtime. These can disrupt your sleep.  · Do not overeat or eat spicy foods right before bedtime. This can lead to digestive discomfort that can make it hard for you to sleep.  · Limit screen use before bedtime. This includes:  ? Watching TV.  ? Using your smartphone, tablet, and computer.  · Stick to a routine. This can help you fall asleep faster. Try to do a quiet activity, brush your teeth, and go to bed at the same time each night.  · Get out of bed if you are still awake after 15 minutes  of trying to sleep. Keep the lights down, but try reading or doing a quiet activity. When you feel sleepy, go back to bed.  · Make sure that you drive carefully. Avoid driving if you feel very sleepy.  · Keep all follow-up appointments as directed by your health care provider. This is important.  Contact a health care provider if:  · You are tired throughout the day or have trouble in your daily routine due to sleepiness.  · You continue to have sleep problems or your sleep problems get worse.  Get help right away if:  · You have serious thoughts about hurting yourself or someone else.  This information is not intended to replace advice given to you by your health care provider. Make sure you discuss any questions you have with your health care provider.  Document Released: 12/15/2001 Document Revised: 05/19/2017 Document Reviewed: 09/18/2015  [employer] Interactive Patient Education © 2018 [employer]  Sleep Apnea  Sleep apnea is a condition that affects breathing. People with sleep apnea have moments during sleep when their breathing pauses briefly or gets shallow. Sleep apnea can cause these symptoms:  · Trouble staying asleep.  · Sleepiness or tiredness during the day.  · Irritability.  · Loud snoring.  · Morning headaches.  · Trouble concentrating.  · Forgetting things.  · Less interest in sex.  · Being sleepy for no reason.  · Mood swings.  · Personality changes.  · Depression.  · Waking up a lot during the night to pee (urinate).  · Dry mouth.  · Sore throat.    Follow these instructions at home:  · Make any changes in your routine that your doctor recommends.  · Eat a healthy, well-balanced diet.  · Take over-the-counter and prescription medicines only as told by your doctor.  · Avoid using alcohol, calming medicines (sedatives), and narcotic medicines.  · Take steps to lose weight if you are overweight.  · If you were given a machine (device) to use while you sleep, use it only as told by your doctor.  · Do  not use any tobacco products, such as cigarettes, chewing tobacco, and e-cigarettes. If you need help quitting, ask your doctor.  · Keep all follow-up visits as told by your doctor. This is important.  Contact a doctor if:  · The machine that you were given to use during sleep is uncomfortable or does not seem to be working.  · Your symptoms do not get better.  · Your symptoms get worse.  Get help right away if:  · Your chest hurts.  · You have trouble breathing in enough air (shortness of breath).  · You have an uncomfortable feeling in your back, arms, or stomach.  · You have trouble talking.  · One side of your body feels weak.  · A part of your face is hanging down (drooping).  These symptoms may be an emergency. Do not wait to see if the symptoms will go away. Get medical help right away. Call your local emergency services (911 in the [place] Do not drive yourself to the hospital.  This information is not intended to replace advice given to you by your health care provider. Make sure you discuss any questions you have with your health care provider.  Document Released: 09/26/2009 Document Revised: 07/16/2018 Document Reviewed: 09/26/2016  [employer] Interactive Patient Education © 2019 [employer]

## 2019-07-02 ENCOUNTER — OFFICE VISIT (OUTPATIENT)
Dept: ORTHOPEDIC SURGERY | Facility: CLINIC | Age: 64
End: 2019-07-02

## 2019-07-02 VITALS — HEART RATE: 71 BPM | HEIGHT: 67 IN | BODY MASS INDEX: 24.08 KG/M2 | OXYGEN SATURATION: 100 % | WEIGHT: 153.44 LBS

## 2019-07-02 DIAGNOSIS — M25.562 LEFT KNEE PAIN, UNSPECIFIED CHRONICITY: Primary | ICD-10-CM

## 2019-07-02 DIAGNOSIS — M17.12 PRIMARY OSTEOARTHRITIS OF LEFT KNEE: ICD-10-CM

## 2019-07-02 PROCEDURE — 99214 OFFICE O/P EST MOD 30 MIN: CPT | Performed by: ORTHOPAEDIC SURGERY

## 2019-07-02 PROCEDURE — 20610 DRAIN/INJ JOINT/BURSA W/O US: CPT | Performed by: ORTHOPAEDIC SURGERY

## 2019-07-02 RX ORDER — TRAZODONE HYDROCHLORIDE 150 MG/1
150 TABLET ORAL NIGHTLY
COMMUNITY
End: 2019-11-06 | Stop reason: SDUPTHER

## 2019-07-02 RX ORDER — LIDOCAINE HYDROCHLORIDE 10 MG/ML
3 INJECTION, SOLUTION EPIDURAL; INFILTRATION; INTRACAUDAL; PERINEURAL
Status: COMPLETED | OUTPATIENT
Start: 2019-07-02 | End: 2019-07-02

## 2019-07-02 RX ORDER — TRIAMCINOLONE ACETONIDE 40 MG/ML
40 INJECTION, SUSPENSION INTRA-ARTICULAR; INTRAMUSCULAR
Status: COMPLETED | OUTPATIENT
Start: 2019-07-02 | End: 2019-07-02

## 2019-07-02 RX ADMIN — TRIAMCINOLONE ACETONIDE 40 MG: 40 INJECTION, SUSPENSION INTRA-ARTICULAR; INTRAMUSCULAR at 14:28

## 2019-07-02 RX ADMIN — LIDOCAINE HYDROCHLORIDE 3 ML: 10 INJECTION, SOLUTION EPIDURAL; INFILTRATION; INTRACAUDAL; PERINEURAL at 14:28

## 2019-07-02 NOTE — PROGRESS NOTES
Procedure   Large Joint Arthrocentesis: L knee  Date/Time: 7/2/2019 2:28 PM  Consent given by: patient  Site marked: site marked  Timeout: Immediately prior to procedure a time out was called to verify the correct patient, procedure, equipment, support staff and site/side marked as required   Supporting Documentation  Indications: pain   Procedure Details  Location: knee - L knee  Preparation: Patient was prepped and draped in the usual sterile fashion  Needle size: 22 G  Approach: anterolateral  Medications administered: 3 mL lidocaine PF 1% 1 %; 40 mg triamcinolone acetonide 40 MG/ML  Patient tolerance: patient tolerated the procedure well with no immediate complications

## 2019-07-02 NOTE — PROGRESS NOTES
[facility] [place] [facility]opa[facility] Note    Subjective     Pain of the Left Knee (Pain has been present for 2-3 months. The pain is more of a dull/ache and is not consistent. The pain is more present at night. When the knee is hurting it is more of a 6-7/10. No modifying factors./)      HPI    Kaitlynn Le is a 63 y.o. female.  Patient is here today for new problem today regarding her left knee.  This has been going on for 2 to 3 months.  No history of any trauma.  She has a dull ache and the need to move the knee when she sitting still for too long.  The pain is mostly medial to superior medial.  She rates her discomfort a 7 out of 10.  She has not tried any treatment to date.  She had a similar type situation on the contralateral knee that responded very well to a corticosteroid injection.    Past Medical History:   Diagnosis Date   • Cancer (CMS/HCC)    • GERD (gastroesophageal reflux disease) 3/12/2019   • History of medical problems July 2018    Vertigo   • History of radiation therapy    • Hyperlipidemia    • Kidney stones 04/13/2017    bilateral   • Osteoarthritis of knee    • Osteopenia of multiple sites 9/19/2018   • Osteoporosis    • Pes planus, flexible    • Tibialis posterior tendinitis    • Urinary tract infection July 2017   • Varicose veins of legs       Past Surgical History:   Procedure Laterality Date   • BREAST RECONSTRUCTION Bilateral 2014   • COLONOSCOPY  2/252016   • CYSTOSCOPY  04/13/2017    right ureteral stone Dr Lofton   • CYSTOSCOPY  12/07/2009   • CYSTOSCOPY INSERTION / REMOVAL STENT / STONE  05/01/2009    calcium oxxylate mono   • EXTRACORPOREAL SHOCK WAVE LITHOTRIPSY (ESWL) Left 03/2009   • KNEE ARTHROSCOPY W/ MENISCAL REPAIR Right 2011   • MASTECTOMY MODIFIED RADICAL / SIMPLE / COMPLETE Left 2013    breast cancer   • SIMPLE MASTECTOMY Right 2013    hx breast Ca left   • SKIN BIOPSY  07/2017    mid thoracic back Dr Salinas   • TONSILLECTOMY  09/2015   • WISDOM TOOTH EXTRACTION  2010       Family History   Problem Relation Age of Onset   • Breast cancer Mother    • Mitral valve prolapse Mother    • Heart failure Mother    • Heart disease Mother         Congestive heart failure   • Cancer Mother         Breast cancer   • Mitral valve prolapse Father    • Heart disease Father         Mitral valve replacement   • Cancer Father         Prostate cancer   • Migraines Sister    • Mitral valve prolapse Brother    • Cancer Brother         Prostate cancer   • Heart disease Brother         Mitral valve replacement   • Cancer Other      Social History     Socioeconomic History   • Marital status:      Spouse name: Not on file   • Number of children: Not on file   • Years of education: Not on file   • Highest education level: Not on file   Tobacco Use   • Smoking status: Never Smoker   • Smokeless tobacco: Never Used   Substance and Sexual Activity   • Alcohol use: No   • Drug use: No   • Sexual activity: Yes     Partners: Male     Birth control/protection: Post-menopausal     Comment:       Current Outpatient Medications on File Prior to Visit   Medication Sig Dispense Refill   • traZODone (DESYREL) 150 MG tablet Take 150 mg by mouth Every Night.     • aspirin 81 MG chewable tablet Chew 81 mg Daily.     • Calcium Citrate-Vitamin D (CALCIUM CITRATE + D PO) Take  by mouth.     • exemestane (AROMASIN) 25 MG chemo tablet 1 tablet Daily.     • metroNIDAZOLE (METROCREAM) 0.75 % cream As Needed.     • nystatin (MYCOSTATIN) 614058 UNIT/GM cream As Needed.     • triamcinolone (KENALOG) 0.1 % cream As Needed.     • vitamin B-12 (CYANOCOBALAMIN) 500 MCG tablet Take 500 mcg by mouth Daily.     • [DISCONTINUED] zolpidem (AMBIEN) 5 MG tablet Take 1 tablet by mouth At Night As Needed for Sleep. 30 tablet 0     No current facility-administered medications on file prior to visit.       Allergies   Allergen Reactions   • Caffeine Other (See Comments)     Headaches, speeds up heart rate, gets acne with chocolate,  "keeps her from sleeping   • Ibuprofen GI Intolerance   • Adhesive Tape Rash        The following portions of the patient's history were reviewed and updated as appropriate: allergies, current medications, past family history, past medical history, past social history, past surgical history and problem list.    Review of Systems   Constitutional: Negative.    HENT: Negative.    Eyes: Negative.    Respiratory: Negative.    Cardiovascular: Negative.    Gastrointestinal: Negative.    Endocrine: Negative.    Genitourinary: Negative.    Musculoskeletal: Negative.         Left knee pain    Skin: Negative.    Allergic/Immunologic: Negative.    Neurological: Negative.    Hematological: Negative.    Psychiatric/Behavioral: Negative.         Objective      Physical Exam  Pulse 71   Ht 170.2 cm (67.01\")   Wt 69.6 kg (153 lb 7 oz)   SpO2 100%   Breastfeeding? No   BMI 24.03 kg/m²     Body mass index is 24.03 kg/m².    General  Mental Status - alert  General Appearance - cooperative, well groomed, not in acute distress  Orientation - Oriented X3  Build & Nutrition - well developed and well nourished  Posture - normal posture  Gait - normal gait     Integumentary  Global Assessment  Examination of related systems reveals - no lymphadenopathy  Ears:  No abnormality  Nose:  No mucous drainage  General Characteristics  Overall examination of the patient's skin reveals - no rashes, no evidence of scars, no suspicious lesions and no bruises.  Color - normal coloration of skin.  Vascular: Brisk capillary refill in all extremities    Ortho Exam  Peripheral Vascular:    Upper Extremity:   Inspection:  Left--no cyanotic nail beds Right--no cyanotic nail beds   Bilateral:  Pink nail beds with brisk capillary refill   Palpation:  Bilateral radial pulse normal    Musculoskeletal:  Global Assessment:  Overall assessment of Lower Extremity Muscle Strength and Tone:  Left quadriceps--5/5   Left hamstrings--5/5       Left tibialis " anterior--5/5  Left gastroc-soleus--5/5  Left EHL --5/5    Lower Extremity:  Knee/Patella:  No digital clubbing or cyanosis.    Examination of left knee reveals:  Normal deep tendon reflexes, coordination, strength, tone, sensation.  No known fractures or deformities.    Inspection and Palpation:  Left knee:  Tenderness: None  Effusion:  1+  Crepitus:  Positive  Pulses:  2+  Ecchymosis:  None  Warmth:  None     ROM:  Right:  Extension: 5    Flexion: 120   Left:  Extension: 5     Flexion:120    Instability:    Left:  Lachman Test:  Negative, Varus stress test negative, Valgus stress test negative    Deformities/Malalignments/Discrepancies:    Left:  Genu Varum   Right:  No deformity    Functional Testing:  Obed's test:  Negative  Patella grind test:  Positive  Q-angle:  normal      Imaging/Studies  We reviewed x-rays of the patient's left knee today in the office.  There are mild early moderate patellofemoral changes and mild to early moderate medial compartment changes with a relatively good joint space preservation.    Assessment:  1. Left knee pain, unspecified chronicity    2. Primary osteoarthritis of left knee        Plan:  1. Continue over-the-counter medication as needed for discomfort  2. Osteoarthritis left knee--left knee steroid injection will be given today given her positive response to an injection in the past on the contralateral side  3. Chronic left knee pain--physical therapy will be reordered.  Patient see the patient 1-2 times only teach her a home quadricep and hamstring strengthening program including fashion from 0 to 90 degrees.  4. Follow-up in about 6 weeks and if she is no better, consider further imaging.      Medical Decision Making  Management Options : over-the-counter medicine, prescription/IM medicine and physical/occupational therapy  Data/Risk: radiology tests    Harsh Moreno MD  07/02/19  2:25 PM

## 2019-07-29 ENCOUNTER — HOSPITAL ENCOUNTER (OUTPATIENT)
Dept: PHYSICAL THERAPY | Facility: HOSPITAL | Age: 64
Setting detail: THERAPIES SERIES
Discharge: HOME | End: 2019-07-29

## 2019-07-29 DIAGNOSIS — M25.562 LEFT KNEE PAIN, UNSPECIFIED CHRONICITY: Primary | ICD-10-CM

## 2019-07-29 DIAGNOSIS — M17.12 PRIMARY OSTEOARTHRITIS OF LEFT KNEE: ICD-10-CM

## 2019-07-29 PROCEDURE — 97161 PT EVAL LOW COMPLEX 20 MIN: CPT

## 2019-07-29 NOTE — THERAPY EVALUATION
Outpatient Physical Therapy Ortho Initial Evaluation   [place]     Patient Name: Kaitlynn Le  : 1955  MRN: [id number]  Today's Date: 2019      Visit Date: 2019    Patient Active Problem List   Diagnosis   • Kidney stones   • History of cancer of left breast   • Iron deficiency   • Hyperlipidemia   • Varicose veins of legs   • Moderate obstructive sleep apnea   • Retrognathia   • Psychophysiological insomnia   • Osteopenia of multiple sites   • GERD (gastroesophageal reflux disease)        Past Medical History:   Diagnosis Date   • Cancer (CMS/HCC)    • GERD (gastroesophageal reflux disease) 3/12/2019   • History of medical problems 2018    Vertigo   • History of radiation therapy    • Hyperlipidemia    • Kidney stones 2017    bilateral   • Osteoarthritis of knee    • Osteopenia of multiple sites 2018   • Osteoporosis    • Pes planus, flexible    • Tibialis posterior tendinitis    • Urinary tract infection 2017   • Varicose veins of legs         Past Surgical History:   Procedure Laterality Date   • BREAST RECONSTRUCTION Bilateral    • COLONOSCOPY     • CYSTOSCOPY  2017    right ureteral stone Dr Lofton   • CYSTOSCOPY  2009   • CYSTOSCOPY INSERTION / REMOVAL STENT / STONE  2009    calcium oxxylate mono   • EXTRACORPOREAL SHOCK WAVE LITHOTRIPSY (ESWL) Left 2009   • KNEE ARTHROSCOPY W/ MENISCAL REPAIR Right    • MASTECTOMY MODIFIED RADICAL / SIMPLE / COMPLETE Left 2013    breast cancer   • SIMPLE MASTECTOMY Right 2013    hx breast Ca left   • SKIN BIOPSY  2017    mid thoracic back Dr Salinas   • TONSILLECTOMY  2015   • WISDOM TOOTH EXTRACTION         Visit Dx:     ICD-10-CM ICD-9-CM   1. Left knee pain, unspecified chronicity M25.562 719.46   2. Primary osteoarthritis of left knee M17.12 715.16         Patient History     Row Name 19 0900             History    Chief Complaint  Pain  -LF      Type of Pain  Knee  pain  -LF      Date Current Problem(s) Began  -- Spring 2019  -LF      Brief Description of Current Complaint  Mrs. Le presents with c/o left knee pain.  Began early spring of this year.  Had  injection earlier this month and helped initially, but feels she twisted wrong way and aggravated it few days ago.  Has iced recently and that seems to help.  Has not bothered her at night since injection like it had previously.   Now primarily just with sitting. She also reports onset R knee pain recently.  She has had meniscus surgery 2011, and injection 2014, and seemed to be faring well until this summer.  -LF      Previous treatment for THIS PROBLEM  Injections  -LF      Patient/Caregiver Goals  Know what to do to help the symptoms  -LF      Occupation/sports/leisure activities  walks daily, [place]  -LF      What clinical tests have you had for this problem?  X-ray  -LF      Results of Clinical Tests  OA  -LF         Pain     Pain Location  Knee  -LF      Pain at Present  2  -LF      Pain at Best  0  -LF      Pain at Worst  6  -LF      Pain Frequency  Intermittent  -LF      Pain Description  Dull  -LF      What Performance Factors Make the Current Problem(s) WORSE?  sitting, avoids squatting,   -LF      What Performance Factors Make the Current Problem(s) BETTER?  changing positions, ice  -LF         Fall Risk Assessment    Any falls in the past year:  No  -LF         Daily Activities    Primary Language  English  -LF      Are you able to read  Yes  -LF      Are you able to write  Yes  -LF      How does patient learn best?  Listening;Reading;Demonstration  -LF      Teaching needs identified  Home Exercise Program;Management of Condition  -LF      Barriers to learning  None  -LF      Pt Participated in POC and Goals  Yes  -LF        User Key  (r) = Recorded By, (t) = Taken By, (c) = Cosigned By    Initials Name Provider Type    LF Vianca Douglas, PT Physical Therapist          PT Ortho     Row Name 07/29/19 0900        Precautions and Contraindications    Precautions  Vertigo:  can not lay on her side or roll.  Positions to supine from long-sitting  -LF       Posture/Observations    Posture/Observations Comments  no significant postural deviations.  No pain upon palpation left knee, and no apparent swelling  -LF       Special Tests/Palpation    Special Tests/Palpation  Knee  -LF       Patellar Accessory Motions    Patellar Accessory Motions Tested?  Yes  -LF    Superior glide  Left:;WNL  -LF    Inferior glide  Left:;WNL  -LF    Medial glide  Left:;WNL  -LF    Lateral glide  Left:;WNL  -LF       Knee Special Tests    Valgus stress (MCL lesion)  Left:;Negative  -LF    Varus stress (LCL lesion)  Left:;Negative  -LF    Obed’s test (meniscal lesion)  Left:;Negative  -LF    Patellar grind test (chondromalacia patella)  Left:;Negative  -LF       General ROM    RT Lower Ext  Rt Knee Extension/Flexion  -LF    LT Lower Ext  Lt Knee Extension/Flexion  -LF       Right Lower Ext    Rt Knee Extension/Flexion AROM  0-134  -LF       Left Lower Ext    Lt Knee Extension/Flexion AROM  0-132  -LF       MMT (Manual Muscle Testing)    General MMT Comments  LE strength is 5/5 except left hip flex and knee flex 4+/5  -LF       Flexibility    Flexibility Tested?  Lower Extremity  -LF       Lower Extremity Flexibility    Hamstrings  Bilateral:;Moderately limited  -LF      User Key  (r) = Recorded By, (t) = Taken By, (c) = Cosigned By    Initials Name Provider Type    Vianca Matthews, PT Physical Therapist            Therapy Education  Education Details: Provided HEP for SLR, bridge, HS stretch, 3-way kicks and sidestep with red band, minisquats  Given: HEP  Program: New  How Provided: Verbal, Demonstration, Written  Provided to: Patient  Level of Understanding: Teach back education performed, Verbalized, Demonstrated     PT OP Goals     Row Name 07/29/19 0900          PT Short Term Goals    STG Date to Achieve  08/26/19  -LF     STG 1  Patient  to report 50% improvement in pain with sitting  -LF     STG 1 Progress  New  -LF     STG 2  Patient independent and compliant for HEP for management of symptoms  -LF     STG 2 Progress  New  -LF        Time Calculation    PT Goal Re-Cert Due Date  10/27/19  -LF       User Key  (r) = Recorded By, (t) = Taken By, (c) = Cosigned By    Initials Name Provider Type    LF Vianca Douglas, PT Physical Therapist          PT Assessment/Plan     Row Name 07/29/19 0900          PT Assessment    Functional Limitations  Performance in leisure activities;Performance in self-care ADL  -LF     Impairments  Pain;Impaired flexibility;Muscle strength  -LF     Assessment Comments  Patient presents with signs and symptoms consistent with knee OA.  She has had improvement in symptoms with recent injection.  Primary complaint is pain with sitting.  Established HEP today for strength and flexibility.  Will plan to follow-up 1 more visit to review/modify HEP as needed  -LF     Please refer to paper survey for additional self-reported information  Yes  -LF     Rehab Potential  Good  -LF     Patient/caregiver participated in establishment of treatment plan and goals  Yes  -LF     Patient would benefit from skilled therapy intervention  Yes  -LF        PT Plan    PT Frequency  1x/week  -LF     Predicted Duration of Therapy Intervention (Therapy Eval)  2 visits  -LF     Planned CPT's?  PT EVAL LOW COMPLEXITY: 06494;PT THER PROC EA 15 MIN: 70041;PT MANUAL THERAPY EA 15 MIN: 54640;PT GAIT TRAINING EA 15 MIN: 85876;PT SELF CARE/HOME MGMT/TRAIN EA 15: 10109;PT HOT OR COLD PACK TREAT [facility]  -LF     PT Plan Comments  follow-up in next week if needed and adjust HEP accordingly  -LF       User Key  (r) = Recorded By, (t) = Taken By, (c) = Cosigned By    Initials Name Provider Type     Vianca Douglas, PT Physical Therapist                   Outcome Measure Options: Lower Extremity Functional Scale (LEFS)  Lower Extremity Functional Index  Any of  your usual work, housework or school activities: No difficulty  Your usual hobbies, recreational or sporting activities: No difficulty  Getting into or out of the bath: Moderate difficulty  Walking between rooms: No difficulty  Putting on your shoes or socks: No difficulty  Squatting: Quite a bit of difficulty  Lifting an object, like a bag of groceries from the floor: No difficulty  Performing light activities around your home: No difficulty  Performing heavy activities around your home: No difficulty  Getting into or out of a car: Moderate difficulty  Walking 2 blocks: No difficulty  Walking a mile: No difficulty  Going up or down 10 stairs (about 1 flight of stairs): No difficulty  Standing for 1 hour: No difficulty  Sitting for 1 hour: Moderate difficulty  Running on even ground: No difficulty  Running on uneven ground: No difficulty      Time Calculation:     Start Time: 0900     Therapy Charges for Today     Code Description Service Date Service Provider Modifiers Qty    96244112582 HC PT EVAL LOW COMPLEXITY 4 7/29/2019 Vianca Douglas, PT GP 1          PT G-Codes  Outcome Measure Options: Lower Extremity Functional Scale (LEFS)         Vianca Douglas PT  7/29/2019

## 2019-08-06 ENCOUNTER — APPOINTMENT (OUTPATIENT)
Dept: PHYSICAL THERAPY | Facility: HOSPITAL | Age: 64
End: 2019-08-06

## 2019-08-08 ENCOUNTER — DOCUMENTATION (OUTPATIENT)
Dept: PHYSICAL THERAPY | Facility: HOSPITAL | Age: 64
End: 2019-08-08

## 2019-08-08 DIAGNOSIS — M17.12 PRIMARY OSTEOARTHRITIS OF LEFT KNEE: ICD-10-CM

## 2019-08-08 DIAGNOSIS — M25.562 LEFT KNEE PAIN, UNSPECIFIED CHRONICITY: Primary | ICD-10-CM

## 2019-08-08 NOTE — THERAPY DISCHARGE NOTE
Outpatient Physical Therapy Discharge Summary         Patient Name: Kaitlynn Le  : 1955  MRN: [id number]    Today's Date: 2019    Visit Dx:    ICD-10-CM ICD-9-CM   1. Left knee pain, unspecified chronicity M25.562 719.46   2. Primary osteoarthritis of left knee M17.12 715.16           OP PT Discharge Summary  Date of Discharge: 19  Reason for Discharge: Patient/Caregiver request  Discharge Destination: Home with home program  Discharge Instructions/Additional Comments: Mrs. Le was seen for evaluation only.  She cancelled her follow-up visit stating she was doing well.  HEP was provided.  Unable to assess goals.      Vianca Douglas, PT  2019

## 2019-09-11 ENCOUNTER — OFFICE VISIT (OUTPATIENT)
Dept: INTERNAL MEDICINE | Facility: CLINIC | Age: 64
End: 2019-09-11

## 2019-09-11 VITALS
HEART RATE: 60 BPM | TEMPERATURE: 98 F | HEIGHT: 67 IN | OXYGEN SATURATION: 100 % | WEIGHT: 148.4 LBS | BODY MASS INDEX: 23.29 KG/M2 | SYSTOLIC BLOOD PRESSURE: 120 MMHG | DIASTOLIC BLOOD PRESSURE: 68 MMHG

## 2019-09-11 DIAGNOSIS — Z13.228 SCREENING FOR ENDOCRINE, METABOLIC AND IMMUNITY DISORDER: ICD-10-CM

## 2019-09-11 DIAGNOSIS — E53.8 LOW VITAMIN B12 LEVEL: ICD-10-CM

## 2019-09-11 DIAGNOSIS — Z23 NEED FOR IMMUNIZATION AGAINST INFLUENZA: ICD-10-CM

## 2019-09-11 DIAGNOSIS — Z12.11 SCREENING FOR COLON CANCER: ICD-10-CM

## 2019-09-11 DIAGNOSIS — Z85.3 HISTORY OF CANCER OF LEFT BREAST: ICD-10-CM

## 2019-09-11 DIAGNOSIS — E78.5 HYPERLIPIDEMIA, UNSPECIFIED HYPERLIPIDEMIA TYPE: ICD-10-CM

## 2019-09-11 DIAGNOSIS — Z00.00 ANNUAL PHYSICAL EXAM: Primary | ICD-10-CM

## 2019-09-11 DIAGNOSIS — D72.819 LEUKOPENIA, UNSPECIFIED TYPE: ICD-10-CM

## 2019-09-11 DIAGNOSIS — Z13.0 SCREENING FOR ENDOCRINE, METABOLIC AND IMMUNITY DISORDER: ICD-10-CM

## 2019-09-11 DIAGNOSIS — Z13.29 SCREENING FOR ENDOCRINE, METABOLIC AND IMMUNITY DISORDER: ICD-10-CM

## 2019-09-11 DIAGNOSIS — Z23 NEED FOR HEPATITIS A VACCINATION: ICD-10-CM

## 2019-09-11 LAB
ALBUMIN SERPL-MCNC: 4.6 G/DL (ref 3.5–5.2)
ALBUMIN/GLOB SERPL: 1.8 G/DL
ALP SERPL-CCNC: 74 U/L (ref 39–117)
ALT SERPL-CCNC: 19 U/L (ref 1–33)
AST SERPL-CCNC: 23 U/L (ref 1–32)
BASOPHILS # BLD AUTO: 0.03 10*3/MM3 (ref 0–0.2)
BASOPHILS NFR BLD AUTO: 0.8 % (ref 0–1.5)
BILIRUB BLD-MCNC: NEGATIVE MG/DL
BILIRUB SERPL-MCNC: 0.4 MG/DL (ref 0.2–1.2)
BUN SERPL-MCNC: 18 MG/DL (ref 8–23)
BUN/CREAT SERPL: 20 (ref 7–25)
CALCIUM SERPL-MCNC: 9.4 MG/DL (ref 8.6–10.5)
CHLORIDE SERPL-SCNC: 102 MMOL/L (ref 98–107)
CHOLEST SERPL-MCNC: 249 MG/DL (ref 0–200)
CLARITY, POC: CLEAR
CO2 SERPL-SCNC: 26.6 MMOL/L (ref 22–29)
COLOR UR: YELLOW
CREAT SERPL-MCNC: 0.9 MG/DL (ref 0.57–1)
DEVELOPER EXPIRATION DATE: NORMAL
DEVELOPER LOT NUMBER: NORMAL
EOSINOPHIL # BLD AUTO: 0.11 10*3/MM3 (ref 0–0.4)
EOSINOPHIL NFR BLD AUTO: 3.1 % (ref 0.3–6.2)
ERYTHROCYTE [DISTWIDTH] IN BLOOD BY AUTOMATED COUNT: 12.7 % (ref 12.3–15.4)
EXPIRATION DATE: NORMAL
FECAL OCCULT BLOOD SCREEN, POC: NEGATIVE
FOLATE SERPL-MCNC: >20 NG/ML (ref 4.78–24.2)
GLOBULIN SER CALC-MCNC: 2.5 GM/DL
GLUCOSE SERPL-MCNC: 95 MG/DL (ref 65–99)
GLUCOSE UR STRIP-MCNC: NEGATIVE MG/DL
HCT VFR BLD AUTO: 43.8 % (ref 34–46.6)
HDLC SERPL-MCNC: 86 MG/DL (ref 40–60)
HGB BLD-MCNC: 13.8 G/DL (ref 12–15.9)
IMM GRANULOCYTES # BLD AUTO: 0.01 10*3/MM3 (ref 0–0.05)
IMM GRANULOCYTES NFR BLD AUTO: 0.3 % (ref 0–0.5)
IRON SATN MFR SERPL: 22 % (ref 20–50)
IRON SERPL-MCNC: 81 MCG/DL (ref 37–145)
KETONES UR QL: NEGATIVE
LDLC SERPL CALC-MCNC: 154 MG/DL (ref 0–100)
LEUKOCYTE EST, POC: NEGATIVE
LYMPHOCYTES # BLD AUTO: 1.18 10*3/MM3 (ref 0.7–3.1)
LYMPHOCYTES NFR BLD AUTO: 33.2 % (ref 19.6–45.3)
Lab: NORMAL
MCH RBC QN AUTO: 32.5 PG (ref 26.6–33)
MCHC RBC AUTO-ENTMCNC: 31.5 G/DL (ref 31.5–35.7)
MCV RBC AUTO: 103.1 FL (ref 79–97)
MONOCYTES # BLD AUTO: 0.32 10*3/MM3 (ref 0.1–0.9)
MONOCYTES NFR BLD AUTO: 9 % (ref 5–12)
NEGATIVE CONTROL: NEGATIVE
NEUTROPHILS # BLD AUTO: 1.9 10*3/MM3 (ref 1.7–7)
NEUTROPHILS NFR BLD AUTO: 53.6 % (ref 42.7–76)
NITRITE UR-MCNC: NEGATIVE MG/ML
NRBC BLD AUTO-RTO: 0 /100 WBC (ref 0–0.2)
PH UR: 6 [PH] (ref 5–8)
PLATELET # BLD AUTO: 219 10*3/MM3 (ref 140–450)
POSITIVE CONTROL: POSITIVE
POTASSIUM SERPL-SCNC: 4.4 MMOL/L (ref 3.5–5.2)
PROT SERPL-MCNC: 7.1 G/DL (ref 6–8.5)
PROT UR STRIP-MCNC: NEGATIVE MG/DL
RBC # BLD AUTO: 4.25 10*6/MM3 (ref 3.77–5.28)
RBC # UR STRIP: NEGATIVE /UL
SODIUM SERPL-SCNC: 143 MMOL/L (ref 136–145)
SP GR UR: 1.03 (ref 1–1.03)
TIBC SERPL-MCNC: 364 MCG/DL
TRIGL SERPL-MCNC: 46 MG/DL (ref 0–150)
TSH SERPL DL<=0.005 MIU/L-ACNC: 2.9 UIU/ML (ref 0.27–4.2)
UIBC SERPL-MCNC: 283 MCG/DL (ref 112–346)
UROBILINOGEN UR QL: NORMAL
VIT B12 SERPL-MCNC: 645 PG/ML (ref 211–946)
VLDLC SERPL CALC-MCNC: 9.2 MG/DL
WBC # BLD AUTO: 3.55 10*3/MM3 (ref 3.4–10.8)

## 2019-09-11 PROCEDURE — 90471 IMMUNIZATION ADMIN: CPT | Performed by: FAMILY MEDICINE

## 2019-09-11 PROCEDURE — 81003 URINALYSIS AUTO W/O SCOPE: CPT | Performed by: FAMILY MEDICINE

## 2019-09-11 PROCEDURE — 90674 CCIIV4 VAC NO PRSV 0.5 ML IM: CPT | Performed by: FAMILY MEDICINE

## 2019-09-11 PROCEDURE — 90632 HEPA VACCINE ADULT IM: CPT | Performed by: FAMILY MEDICINE

## 2019-09-11 PROCEDURE — 90472 IMMUNIZATION ADMIN EACH ADD: CPT | Performed by: FAMILY MEDICINE

## 2019-09-11 PROCEDURE — 99396 PREV VISIT EST AGE 40-64: CPT | Performed by: FAMILY MEDICINE

## 2019-09-11 PROCEDURE — 82270 OCCULT BLOOD FECES: CPT | Performed by: FAMILY MEDICINE

## 2019-09-11 NOTE — PROGRESS NOTES
"Subjective   Kaitlynn Le is a 64 y.o. female.     Chief Complaint   Patient presents with   • Annual Exam     last pap 2017 had bx was negative for tumor/cancer       Visit Vitals  /68 (BP Location: Right arm, Cuff Size: Adult)   Pulse 60   Temp 98 °F (36.7 °C)   Ht 168.9 cm (66.5\")   Wt 67.3 kg (148 lb 6.4 oz)   SpO2 100%   BMI 23.59 kg/m²         History of Present Illness   Pt here for annual exam.       The following portions of the patient's history were reviewed and updated as appropriate: allergies, current medications, past family history, past medical history, past social history, past surgical history and problem list.    Past Medical History:   Diagnosis Date   • Cancer (CMS/HCC)    • GERD (gastroesophageal reflux disease) 3/12/2019   • History of medical problems July 2018    Vertigo   • History of radiation therapy    • Hyperlipidemia    • Kidney stones 04/13/2017    bilateral   • Osteoarthritis of knee    • Osteopenia of multiple sites 9/19/2018   • Osteoporosis    • Pes planus, flexible    • Tibialis posterior tendinitis    • Urinary tract infection July 2017   • Varicose veins of legs       Past Surgical History:   Procedure Laterality Date   • BREAST RECONSTRUCTION Bilateral 2014   • COLONOSCOPY  2/252016   • CYSTOSCOPY  04/13/2017    right ureteral stone Dr Lofton   • CYSTOSCOPY  12/07/2009   • CYSTOSCOPY INSERTION / REMOVAL STENT / STONE  05/01/2009    calcium oxxylate mono   • EXTRACORPOREAL SHOCK WAVE LITHOTRIPSY (ESWL) Left 03/2009   • KNEE ARTHROSCOPY W/ MENISCAL REPAIR Right 2011   • MASTECTOMY MODIFIED RADICAL / SIMPLE / COMPLETE Left 2013    breast cancer   • SIMPLE MASTECTOMY Right 2013    hx breast Ca left   • SKIN BIOPSY  07/2017    mid thoracic back Dr Salinas   • TONSILLECTOMY  09/2015   • WISDOM TOOTH EXTRACTION  2010      Family History   Problem Relation Age of Onset   • Breast cancer Mother    • Mitral valve prolapse Mother    • Heart failure Mother    • Heart disease " Mother         Congestive heart failure   • Cancer Mother         Breast cancer   • Mitral valve prolapse Father    • Heart disease Father         Mitral valve replacement   • Cancer Father         Prostate cancer   • Migraines Sister    • Mitral valve prolapse Brother    • Cancer Brother         Prostate cancer   • Heart disease Brother         Mitral valve replacement   • Cancer Other       Social History     Socioeconomic History   • Marital status:      Spouse name: Not on file   • Number of children: Not on file   • Years of education: Not on file   • Highest education level: Not on file   Tobacco Use   • Smoking status: Never Smoker   • Smokeless tobacco: Never Used   Substance and Sexual Activity   • Alcohol use: No   • Drug use: No   • Sexual activity: Yes     Partners: Male     Birth control/protection: Post-menopausal     Comment:       Allergies   Allergen Reactions   • Caffeine Other (See Comments)     Headaches, speeds up heart rate, gets acne with chocolate, keeps her from sleeping   • Ibuprofen GI Intolerance   • Adhesive Tape Rash       Review of Systems   Constitutional: Negative.  Negative for activity change, appetite change, chills, diaphoresis, fatigue, fever and unexpected weight change.   HENT: Positive for rhinorrhea. Negative for congestion, dental problem, drooling, ear discharge, ear pain, facial swelling, hearing loss, mouth sores, nosebleeds, postnasal drip, sinus pressure, sinus pain, sneezing, sore throat, tinnitus, trouble swallowing and voice change.    Eyes: Negative.  Negative for photophobia, pain, discharge, redness, itching and visual disturbance.   Respiratory: Positive for apnea (using CPAP). Negative for cough, choking, chest tightness, shortness of breath, wheezing and stridor.    Cardiovascular: Negative.  Negative for chest pain, palpitations and leg swelling.   Gastrointestinal: Negative.  Negative for abdominal distention, abdominal pain, anal bleeding,  blood in stool, constipation, diarrhea, nausea, rectal pain and vomiting.   Endocrine: Negative.  Negative for cold intolerance, heat intolerance, polydipsia, polyphagia and polyuria.   Genitourinary: Negative.  Negative for decreased urine volume, difficulty urinating, dyspareunia, dysuria, enuresis, flank pain, frequency, genital sores, hematuria, menstrual problem, pelvic pain, urgency, vaginal bleeding, vaginal discharge and vaginal pain.   Musculoskeletal: Positive for arthralgias (knee pain, sees Dr Friedman). Negative for back pain, gait problem, joint swelling, myalgias, neck pain and neck stiffness.   Skin: Negative.  Negative for color change, pallor, rash and wound.   Allergic/Immunologic: Positive for environmental allergies. Negative for food allergies and immunocompromised state.   Neurological: Negative.  Negative for dizziness, tremors, seizures, syncope, facial asymmetry, speech difficulty, weakness, light-headedness, numbness and headaches.   Hematological: Negative.  Negative for adenopathy. Does not bruise/bleed easily.   Psychiatric/Behavioral: Negative.  Negative for agitation, behavioral problems, confusion, decreased concentration, dysphoric mood, hallucinations, self-injury, sleep disturbance and suicidal ideas. The patient is not nervous/anxious and is not hyperactive.        Objective   Physical Exam   Constitutional: She is oriented to person, place, and time. She appears well-developed and well-nourished. No distress.   HENT:   Head: Normocephalic and atraumatic.   Right Ear: External ear normal. Decreased hearing is noted.   Left Ear: Tympanic membrane, external ear and ear canal normal. Decreased hearing is noted.   Ears:    Nose: Nose normal.   Mouth/Throat: Uvula is midline, oropharynx is clear and moist and mucous membranes are normal. Mucous membranes are not pale, not dry and not cyanotic. Normal dentition. No oropharyngeal exudate, posterior oropharyngeal edema or posterior  oropharyngeal erythema.   Eyes: Conjunctivae, EOM and lids are normal. Pupils are equal, round, and reactive to light. Right eye exhibits no chemosis, no discharge, no exudate and no hordeolum. No foreign body present in the right eye. Left eye exhibits no chemosis, no discharge, no exudate and no hordeolum. No foreign body present in the left eye. Right conjunctiva is not injected. Right conjunctiva has no hemorrhage. Left conjunctiva is not injected. Left conjunctiva has no hemorrhage. No scleral icterus. Right eye exhibits normal extraocular motion and no nystagmus. Left eye exhibits normal extraocular motion and no nystagmus.   Fundoscopic exam:       The right eye shows red reflex.        The left eye shows red reflex.       Neck: Trachea normal and normal range of motion. Neck supple. Carotid bruit is not present. No tracheal deviation present. No thyroid mass and no thyromegaly present.   Cardiovascular: Normal rate, regular rhythm, normal heart sounds and intact distal pulses. Exam reveals no gallop and no friction rub.   No murmur heard.  Pulses:       Dorsalis pedis pulses are 2+ on the right side, and 2+ on the left side.        Posterior tibial pulses are 2+ on the right side, and 2+ on the left side.   Pulmonary/Chest: Effort normal and breath sounds normal. No respiratory distress. She has no decreased breath sounds. She has no wheezes. She has no rhonchi. She has no rales. Chest wall is not dull to percussion. She exhibits no tenderness.       Abdominal: Soft. Bowel sounds are normal. She exhibits no distension and no mass. There is no hepatosplenomegaly. There is no tenderness. There is no rebound and no guarding.   Genitourinary: Rectal exam shows external hemorrhoid. Rectal exam shows no internal hemorrhoid, no fissure, no mass, no tenderness, anal tone normal and guaiac negative stool. Pelvic exam was performed with patient supine.   Musculoskeletal: Normal range of motion. She exhibits no edema,  tenderness or deformity.   Lymphadenopathy:        Head (right side): No submandibular adenopathy present.        Head (left side): No submandibular adenopathy present.     She has no cervical adenopathy.        Right cervical: No superficial cervical, no deep cervical and no posterior cervical adenopathy present.       Left cervical: No superficial cervical, no deep cervical and no posterior cervical adenopathy present.     She has no axillary adenopathy.        Right axillary: No pectoral and no lateral adenopathy present.        Left axillary: No pectoral and no lateral adenopathy present.       Right: No inguinal and no supraclavicular adenopathy present.        Left: No inguinal and no supraclavicular adenopathy present.   Neurological: She is alert and oriented to person, place, and time. She has normal strength and normal reflexes. No cranial nerve deficit or sensory deficit. Coordination normal.   Reflex Scores:       Bicep reflexes are 2+ on the right side and 2+ on the left side.       Brachioradialis reflexes are 2+ on the right side and 2+ on the left side.       Patellar reflexes are 2+ on the right side and 2+ on the left side.  Skin: Skin is warm and dry. No rash noted. She is not diaphoretic. No cyanosis. Nails show no clubbing.   Psychiatric: She has a normal mood and affect. Her speech is normal and behavior is normal. Judgment and thought content normal. Cognition and memory are normal.   Nursing note and vitals reviewed.      Assessment/Plan   Kaitlynn was seen today for annual exam.    Diagnoses and all orders for this visit:    Annual physical exam  -     POCT urinalysis dipstick, automated    Need for hepatitis A vaccination  -     Hepatitis A Vaccine Adult IM    Need for immunization against influenza  -     Flucelvax Quad=>4Years (3043-7193)    Screening for colon cancer  -     POCT occult blood x 1 stool    Hyperlipidemia, unspecified hyperlipidemia type  -     Comprehensive Metabolic  Panel  -     Lipid Panel    Low vitamin B12 level  -     Vitamin B12  -     Folate    Leukopenia, unspecified type  -     CBC & Differential  -     Iron Profile    Screening for endocrine, metabolic and immunity disorder  -     Comprehensive Metabolic Panel  -     TSH  -     CBC & Differential    History of cancer of left breast      Discussed Shingrix vaccine with pt,  that is currently available at the pharmacy.       Please follow a low animal fat diet that is also low in sugar, low in junk food, low in sweet drinks and low in alcohol.  Please increase the amount of fiber in your diet as well as increasing your daily exercise, such as walking.             Current Outpatient Medications:   •  aspirin 81 MG chewable tablet, Chew 81 mg Daily., Disp: , Rfl:   •  Calcium Citrate-Vitamin D (CALCIUM CITRATE + D PO), Take  by mouth., Disp: , Rfl:   •  exemestane (AROMASIN) 25 MG chemo tablet, 1 tablet Daily., Disp: , Rfl:   •  metroNIDAZOLE (METROCREAM) 0.75 % cream, As Needed., Disp: , Rfl:   •  nystatin (MYCOSTATIN) 370464 UNIT/GM cream, As Needed., Disp: , Rfl:   •  traZODone (DESYREL) 150 MG tablet, Take 150 mg by mouth Every Night., Disp: , Rfl:   •  triamcinolone (KENALOG) 0.1 % cream, As Needed., Disp: , Rfl:   •  vitamin B-12 (CYANOCOBALAMIN) 500 MCG tablet, Take 500 mcg by mouth Daily., Disp: , Rfl:     Return in about 6 months (around 3/11/2020), or if symptoms worsen or fail to improve, for Recheck.    Recent Results (from the past 168 hour(s))   POCT urinalysis dipstick, automated    Collection Time: 09/11/19  8:37 AM   Result Value Ref Range    Color Yellow Yellow, Straw, Dark Yellow, Candice    Clarity, UA Clear Clear    Specific Gravity  1.030 1.005 - 1.030    pH, Urine 6.0 5.0 - 8.0    Leukocytes Negative Negative    Nitrite, UA Negative Negative    Protein, POC Negative Negative mg/dL    Glucose, UA Negative Negative, 1000 mg/dL (3+) mg/dL    Ketones, UA Negative Negative    Urobilinogen, UA Normal Normal     Bilirubin Negative Negative    Blood, UA Negative Negative   POCT occult blood x 1 stool    Collection Time: 09/11/19  9:25 AM   Result Value Ref Range    Fecal Occult Blood Negative Negative    Lot Number [id number]     Expiration Date 3/31/22     DEVELOPER LOT NUMBER [id number]     DEVELOPER EXPIRATION DATE 11/30/19     Positive Control Positive Positive    Negative Control Negative Negative       Patient Care Team:  Adela Valdez MD as PCP - General (Family Medicine)  Jahaira Dawn MD as Consulting Physician (Hematology)  Jayy Lofton Jr., MD as Consulting Physician (Urology)  Lv Ramos MD as Consulting Physician (Dermatology)  Ron Pinzon MD as Consulting Physician (Gynecology)  Shyla Rowell as Audiologist  Harsh Moreno MD as Consulting Physician (Orthopedic Surgery)  Lucio Currie APRN as Nurse Practitioner (Nurse Practitioner)  Maggy Carr APRN as Nurse Practitioner (Nurse Practitioner)

## 2019-09-13 ENCOUNTER — TELEPHONE (OUTPATIENT)
Dept: INTERNAL MEDICINE | Facility: CLINIC | Age: 64
End: 2019-09-13

## 2019-09-13 DIAGNOSIS — E78.5 HYPERLIPIDEMIA LDL GOAL <100: Primary | ICD-10-CM

## 2019-09-13 NOTE — TELEPHONE ENCOUNTER
----- Message from Adela LIN MD sent at 9/12/2019  7:09 PM EDT -----  Red cell size mildly increased.  This can be seen with some B vitamin deficiencies and with alcohol use.  Vitamin B12 and folate were acceptable.  Iron thyroid and HDL protective cholesterol are acceptable.  Metabolic panel is acceptable.  LDL bad cholesterol is elevated and has increased.  Need to consider adding statins.    Please follow a low animal fat diet that is also low in sugar, low in junk food, low in sweet drinks and low in alcohol.  Please increase the amount of fiber in your diet as well as increasing your daily exercise, such as walking.

## 2019-09-16 RX ORDER — SIMVASTATIN 20 MG
20 TABLET ORAL NIGHTLY
Qty: 30 TABLET | Refills: 3 | Status: SHIPPED | OUTPATIENT
Start: 2019-09-16 | End: 2020-01-23 | Stop reason: SDDI

## 2019-09-16 NOTE — TELEPHONE ENCOUNTER
Informed pt. She would like to try a statin. She states all of the other recommendations has been done.

## 2019-10-28 RX ORDER — TRAZODONE HYDROCHLORIDE 150 MG/1
150 TABLET ORAL NIGHTLY
Qty: 30 TABLET | Refills: 0 | OUTPATIENT
Start: 2019-10-28

## 2019-11-04 ENCOUNTER — TELEPHONE (OUTPATIENT)
Dept: INTERNAL MEDICINE | Facility: CLINIC | Age: 64
End: 2019-11-04

## 2019-11-04 NOTE — TELEPHONE ENCOUNTER
Regarding: Prescription Question  Contact: [phone]  ----- Message from Mychart, Generic sent at 11/1/2019 11:09 PM EDT -----    I have been taking 150 mg of Trazadone daily since April which has definitely improved my sleeping.  This was prescribed for me by OJ Flores, at the [facility].  My prescription refills have now run out and she won't refill them without my making an appointment.  I'm hoping that I might switch to having you prescribe the Trazadone for me instead as I've not been overly impressed with the Sleep Center and have been over billed by them at least four times.  If I do not have to remain under their care, I would prefer not to.

## 2019-11-06 ENCOUNTER — TELEPHONE (OUTPATIENT)
Dept: INTERNAL MEDICINE | Facility: CLINIC | Age: 64
End: 2019-11-06

## 2019-11-06 RX ORDER — TRAZODONE HYDROCHLORIDE 150 MG/1
150 TABLET ORAL NIGHTLY
Qty: 90 TABLET | Refills: 0 | Status: SHIPPED | OUTPATIENT
Start: 2019-11-06 | End: 2020-01-23 | Stop reason: SDUPTHER

## 2019-11-06 RX ORDER — TRAZODONE HYDROCHLORIDE 150 MG/1
150 TABLET ORAL NIGHTLY
Qty: 30 TABLET | Refills: 0 | Status: SHIPPED | OUTPATIENT
Start: 2019-11-06 | End: 2019-11-06 | Stop reason: SDUPTHER

## 2019-11-06 NOTE — TELEPHONE ENCOUNTER
Will call in one month supply until she is able to make an appt with Dr. Valdez. LVM to rtn call to make an appt at her earliest convenience

## 2019-11-06 NOTE — TELEPHONE ENCOUNTER
LVM that Dr. Valdez is out this week and her email was sent on Friday but wasn't replied back to pt. Need to know when her last refill of trazodone was done and if she has enough until Dr. Valdez comes back next week to see if she is ok with writing the medication for pt.

## 2019-11-06 NOTE — TELEPHONE ENCOUNTER
Pt returned call and stated last refill of trazodone 9/26/2019 and she will be completely out of medication tomorrow.    Kaitlynn can be reached at [phone]

## 2019-11-06 NOTE — TELEPHONE ENCOUNTER
Pt called she wants to get a rx for trzadone 150 mg day she was getting thru [facility] but wants dr dawson to prescribe if possible it runs out this week.

## 2020-01-23 ENCOUNTER — OFFICE VISIT (OUTPATIENT)
Dept: INTERNAL MEDICINE | Facility: CLINIC | Age: 65
End: 2020-01-23

## 2020-01-23 VITALS
OXYGEN SATURATION: 99 % | SYSTOLIC BLOOD PRESSURE: 120 MMHG | BODY MASS INDEX: 23.39 KG/M2 | TEMPERATURE: 98 F | HEIGHT: 67 IN | DIASTOLIC BLOOD PRESSURE: 82 MMHG | WEIGHT: 149 LBS | HEART RATE: 64 BPM

## 2020-01-23 DIAGNOSIS — F51.04 PSYCHOPHYSIOLOGICAL INSOMNIA: ICD-10-CM

## 2020-01-23 DIAGNOSIS — E78.5 HYPERLIPIDEMIA LDL GOAL <100: ICD-10-CM

## 2020-01-23 DIAGNOSIS — G47.33 OBSTRUCTIVE SLEEP APNEA TREATED WITH BIPAP: Primary | ICD-10-CM

## 2020-01-23 DIAGNOSIS — Z85.3 HISTORY OF CANCER OF LEFT BREAST: ICD-10-CM

## 2020-01-23 PROCEDURE — 80053 COMPREHEN METABOLIC PANEL: CPT | Performed by: FAMILY MEDICINE

## 2020-01-23 PROCEDURE — 99214 OFFICE O/P EST MOD 30 MIN: CPT | Performed by: FAMILY MEDICINE

## 2020-01-23 PROCEDURE — 80061 LIPID PANEL: CPT | Performed by: FAMILY MEDICINE

## 2020-01-23 RX ORDER — TRAZODONE HYDROCHLORIDE 150 MG/1
150 TABLET ORAL NIGHTLY
Qty: 90 TABLET | Refills: 0 | Status: SHIPPED | OUTPATIENT
Start: 2020-01-23 | End: 2020-04-22

## 2020-01-23 NOTE — PROGRESS NOTES
"Subjective   Kaitlynn Le is a 64 y.o. female.     Chief Complaint   Patient presents with   • Hyperlipidemia   • Follow-up     would like for us to take over the rxs of trazodone exemestane       Visit Vitals  /82 (BP Location: Left arm, Cuff Size: Adult)   Pulse 64   Temp 98 °F (36.7 °C)   Ht 168.9 cm (66.5\")   Wt 67.6 kg (149 lb)   SpO2 99%   BMI 23.69 kg/m²       Wt Readings from Last 3 Encounters:   01/23/20 67.6 kg (149 lb)   09/11/19 67.3 kg (148 lb 6.4 oz)   07/02/19 69.6 kg (153 lb 7 oz)         Hyperlipidemia   This is a chronic problem. The current episode started more than 1 year ago. Condition status: pending. Recent lipid tests were reviewed and are high. She has no history of chronic renal disease, diabetes, hypothyroidism, liver disease, obesity or nephrotic syndrome. Pertinent negatives include no chest pain, focal sensory loss, focal weakness, leg pain, myalgias or shortness of breath. Current antihyperlipidemic treatment includes exercise and diet change. The current treatment provides significant improvement of lipids. There are no compliance problems.  Risk factors for coronary artery disease include dyslipidemia, family history and post-menopausal.   Insomnia   This is a chronic problem. The current episode started more than 1 year ago. The problem occurs constantly. The problem has been unchanged. Pertinent negatives include no abdominal pain, anorexia, arthralgias, change in bowel habit, chest pain, chills, congestion, coughing, diaphoresis, fatigue, fever, headaches, joint swelling, myalgias, nausea, neck pain, numbness, rash, sore throat, swollen glands, urinary symptoms, vertigo, visual change, vomiting or weakness. Nothing aggravates the symptoms. Treatments tried: trazodone. The treatment provided significant relief.      Pt has enough exemastane for now, but will call when she needs refill.   Pt 's sister had mitral valve replaced 2 weeks ago, brother and father have also had " MV replaced.   Pt never started the simvastatin. Pt walking daily and going to [employer] 3x per week.     Pt has had breast cancer 2013. Pt had testing on 2017 and it was decided to keep her on hormone blocker for 5 more years.     Pt has sleep apnea and has been on bipap with good results and needs new RX with insurance change.   The following portions of the patient's history were reviewed and updated as appropriate: allergies, current medications, past family history, past medical history, past social history, past surgical history and problem list.    Past Medical History:   Diagnosis Date   • Cancer (CMS/HCC)    • GERD (gastroesophageal reflux disease) 3/12/2019   • History of medical problems July 2018    Vertigo   • History of radiation therapy    • Hyperlipidemia    • Kidney stones 04/13/2017    bilateral   • Osteoarthritis of knee    • Osteopenia of multiple sites 9/19/2018   • Osteoporosis    • Pes planus, flexible    • Tibialis posterior tendinitis    • Urinary tract infection July 2017   • Varicose veins of legs       Past Surgical History:   Procedure Laterality Date   • BREAST RECONSTRUCTION Bilateral 2014   • COLONOSCOPY  2/252016   • CYSTOSCOPY  04/13/2017    right ureteral stone Dr Lofton   • CYSTOSCOPY  12/07/2009   • CYSTOSCOPY INSERTION / REMOVAL STENT / STONE  05/01/2009    calcium oxxylate mono   • EXTRACORPOREAL SHOCK WAVE LITHOTRIPSY (ESWL) Left 03/2009   • KNEE ARTHROSCOPY W/ MENISCAL REPAIR Right 2011   • MASTECTOMY MODIFIED RADICAL / SIMPLE / COMPLETE Left 2013    breast cancer   • SIMPLE MASTECTOMY Right 2013    hx breast Ca left   • SKIN BIOPSY  07/2017    mid thoracic back Dr Salinas   • TONSILLECTOMY  09/2015   • WISDOM TOOTH EXTRACTION  2010      Family History   Problem Relation Age of Onset   • Breast cancer Mother    • Mitral valve prolapse Mother    • Heart failure Mother    • Heart disease Mother         Congestive heart failure   • Cancer Mother         Breast cancer   •  Mitral valve prolapse Father    • Heart disease Father         Mitral valve replacement   • Cancer Father         Prostate cancer   • Valvular heart disease Father         mitral valve replaced   • Migraines Sister    • Mitral valve prolapse Sister    • Valvular heart disease Sister         mitral valve replaced   • Mitral valve prolapse Brother    • Cancer Brother         Prostate cancer   • Heart disease Brother         Mitral valve replacement   • Valvular heart disease Brother         mitral valve replaced   • Cancer Other       Social History     Socioeconomic History   • Marital status:      Spouse name: Not on file   • Number of children: Not on file   • Years of education: Not on file   • Highest education level: Not on file   Tobacco Use   • Smoking status: Never Smoker   • Smokeless tobacco: Never Used   Substance and Sexual Activity   • Alcohol use: No   • Drug use: No   • Sexual activity: Yes     Partners: Male     Birth control/protection: Post-menopausal     Comment:       Allergies   Allergen Reactions   • Caffeine Other (See Comments)     Headaches, speeds up heart rate, gets acne with chocolate, keeps her from sleeping   • Ibuprofen GI Intolerance   • Adhesive Tape Rash       Review of Systems   Constitutional: Negative.  Negative for chills, diaphoresis, fatigue and fever.   HENT: Negative.  Negative for congestion, ear pain, nosebleeds, postnasal drip, rhinorrhea, sinus pressure, sneezing and sore throat.    Eyes: Negative.  Negative for redness and itching.   Respiratory: Negative.  Negative for cough, shortness of breath and wheezing.    Cardiovascular: Negative.  Negative for chest pain and palpitations.   Gastrointestinal: Negative.  Negative for abdominal pain, anorexia, change in bowel habit, constipation, diarrhea, nausea and vomiting.   Endocrine: Negative.  Negative for cold intolerance and heat intolerance.   Genitourinary: Negative.  Negative for dysuria, frequency,  hematuria and urgency.   Musculoskeletal: Negative.  Negative for arthralgias, back pain, joint swelling, myalgias and neck pain.   Skin: Negative.  Negative for color change and rash.   Allergic/Immunologic: Negative.  Negative for environmental allergies.   Neurological: Negative.  Negative for dizziness, vertigo, focal weakness, syncope, weakness, light-headedness, numbness and headaches.   Hematological: Negative.  Negative for adenopathy. Does not bruise/bleed easily.   Psychiatric/Behavioral: Negative for dysphoric mood. The patient has insomnia. The patient is not nervous/anxious.        Objective   Physical Exam   Constitutional: She is oriented to person, place, and time. She appears well-developed.   HENT:   Head: Normocephalic.   Right Ear: External ear normal.   Left Ear: External ear normal.   Nose: Nose normal.   Eyes: Pupils are equal, round, and reactive to light. Conjunctivae, EOM and lids are normal.   Neck: Trachea normal and normal range of motion. Neck supple. Carotid bruit is not present. No thyroid mass and no thyromegaly present.   Cardiovascular: Normal rate and regular rhythm.   No murmur heard.  Pulmonary/Chest: Effort normal and breath sounds normal. No respiratory distress. She has no decreased breath sounds. She has no wheezes. She has no rhonchi. She has no rales. She exhibits no tenderness.   Abdominal: Soft. Bowel sounds are normal. There is no tenderness.   Musculoskeletal: Normal range of motion.   Neurological: She is alert and oriented to person, place, and time.   Skin: Skin is warm and dry.   Psychiatric: She has a normal mood and affect. Her behavior is normal.   Nursing note and vitals reviewed.      Assessment/Plan   Kaitlynn was seen today for hyperlipidemia and follow-up.    Diagnoses and all orders for this visit:    Obstructive sleep apnea treated with BiPAP  -     CPAP Therapy    Hyperlipidemia LDL goal <100  -     Comprehensive Metabolic Panel  -     Lipid  Panel    Psychophysiological insomnia  -     traZODone (DESYREL) 150 MG tablet; Take 1 tablet by mouth Every Night.    History of cancer of left breast      Pt will call when she needs the aromasin             Current Outpatient Medications:   •  aspirin 81 MG chewable tablet, Chew 81 mg Daily., Disp: , Rfl:   •  Calcium Citrate-Vitamin D (CALCIUM CITRATE + D PO), Take  by mouth., Disp: , Rfl:   •  exemestane (AROMASIN) 25 MG chemo tablet, 1 tablet Daily., Disp: , Rfl:   •  metroNIDAZOLE (METROCREAM) 0.75 % cream, As Needed., Disp: , Rfl:   •  nystatin (MYCOSTATIN) 698687 UNIT/GM cream, As Needed., Disp: , Rfl:   •  traZODone (DESYREL) 150 MG tablet, Take 1 tablet by mouth Every Night., Disp: 90 tablet, Rfl: 0  •  triamcinolone (KENALOG) 0.1 % cream, As Needed., Disp: , Rfl:   •  vitamin B-12 (CYANOCOBALAMIN) 500 MCG tablet, Take 500 mcg by mouth Daily., Disp: , Rfl:     Return in about 3 months (around 4/23/2020), or if symptoms worsen or fail to improve, for Recheck.

## 2020-01-24 LAB
ALBUMIN SERPL-MCNC: 4.6 G/DL (ref 3.5–5.2)
ALBUMIN/GLOB SERPL: 1.8 G/DL
ALP SERPL-CCNC: 76 U/L (ref 39–117)
ALT SERPL W P-5'-P-CCNC: 11 U/L (ref 1–33)
ANION GAP SERPL CALCULATED.3IONS-SCNC: 12.8 MMOL/L (ref 5–15)
AST SERPL-CCNC: 12 U/L (ref 1–32)
BILIRUB SERPL-MCNC: 0.4 MG/DL (ref 0.2–1.2)
BUN BLD-MCNC: 15 MG/DL (ref 8–23)
BUN/CREAT SERPL: 18.3 (ref 7–25)
CALCIUM SPEC-SCNC: 9.5 MG/DL (ref 8.6–10.5)
CHLORIDE SERPL-SCNC: 103 MMOL/L (ref 98–107)
CHOLEST SERPL-MCNC: 268 MG/DL (ref 0–200)
CO2 SERPL-SCNC: 27.2 MMOL/L (ref 22–29)
CREAT BLD-MCNC: 0.82 MG/DL (ref 0.57–1)
GFR SERPL CREATININE-BSD FRML MDRD: 70 ML/MIN/1.73
GLOBULIN UR ELPH-MCNC: 2.5 GM/DL
GLUCOSE BLD-MCNC: 97 MG/DL (ref 65–99)
HDLC SERPL-MCNC: 91 MG/DL (ref 40–60)
LDLC SERPL CALC-MCNC: 169 MG/DL (ref 0–100)
LDLC/HDLC SERPL: 1.85 {RATIO}
POTASSIUM BLD-SCNC: 4.7 MMOL/L (ref 3.5–5.2)
PROT SERPL-MCNC: 7.1 G/DL (ref 6–8.5)
SODIUM BLD-SCNC: 143 MMOL/L (ref 136–145)
TRIGL SERPL-MCNC: 42 MG/DL (ref 0–150)
VLDLC SERPL-MCNC: 8.4 MG/DL (ref 5–40)

## 2020-02-17 ENCOUNTER — TELEPHONE (OUTPATIENT)
Dept: INTERNAL MEDICINE | Facility: CLINIC | Age: 65
End: 2020-02-17

## 2020-02-17 NOTE — TELEPHONE ENCOUNTER
Patient called and stated her insurance changed in Jan and her pharmacy has change to [facility].  Patient stated she needs a refill on her exemestane (AROMASIN) 25 MG chemo tablet patient has enough to get her through the week wanted to request a week ahead of time.      Patient call back [phone]    Pharmacy [facility]     Please advise

## 2020-04-21 DIAGNOSIS — F51.04 PSYCHOPHYSIOLOGICAL INSOMNIA: ICD-10-CM

## 2020-04-22 RX ORDER — TRAZODONE HYDROCHLORIDE 150 MG/1
150 TABLET ORAL NIGHTLY
Qty: 90 TABLET | Refills: 0 | Status: SHIPPED | OUTPATIENT
Start: 2020-04-22 | End: 2020-07-21

## 2020-06-22 ENCOUNTER — E-VISIT (OUTPATIENT)
Dept: INTERNAL MEDICINE | Facility: CLINIC | Age: 65
End: 2020-06-22

## 2020-06-22 DIAGNOSIS — J30.9 ALLERGIC RHINITIS, UNSPECIFIED SEASONALITY, UNSPECIFIED TRIGGER: Primary | ICD-10-CM

## 2020-06-22 PROCEDURE — 99421 OL DIG E/M SVC 5-10 MIN: CPT | Performed by: FAMILY MEDICINE

## 2020-06-22 NOTE — PROGRESS NOTES
Kaitlynn Le    1955  [id number]    I have reviewed the e-Visit questionnaire and patient's answers, my assessment and plan are as follows:    HPI  Patient complains of nasal congestion-during allergy season    Review of Systems -    Positive: nasal congestion, post nasal drip    Negative: fever chills      Diagnoses and all orders for this visit:    Allergic rhinitis, unspecified seasonality, unspecified trigger    Other orders  -     Chlorcyclizine-Pseudoephed (Stahist AD) 25-60 MG tablet; Take 1 tablet by mouth 2 (Two) Times a Day As Needed (congestion).        Any medications prescribed have been sent electronically to   [id number] - [place], [place] - [address] [facility] - [phone]  - [phone]   [address]  [facility] [address]  Phone: [phone] Fax: [phone]      Total time spent in evaluation, communication, and management of this patient during this E visit was 5 minutes     Return if symptoms worsen or fail to improve, for Recheck.    Adela Valdez MD  06/22/20  10:06 AM

## 2020-06-29 ENCOUNTER — TELEMEDICINE (OUTPATIENT)
Dept: FAMILY MEDICINE CLINIC | Facility: TELEHEALTH | Age: 65
End: 2020-06-29

## 2020-06-29 VITALS
BODY MASS INDEX: 20.16 KG/M2 | HEIGHT: 68 IN | WEIGHT: 133 LBS | SYSTOLIC BLOOD PRESSURE: 124 MMHG | DIASTOLIC BLOOD PRESSURE: 70 MMHG | RESPIRATION RATE: 20 BRPM | TEMPERATURE: 98.4 F

## 2020-06-29 DIAGNOSIS — J30.2 SEASONAL ALLERGIES: Primary | ICD-10-CM

## 2020-06-29 DIAGNOSIS — R09.89 THROAT CLEARING: ICD-10-CM

## 2020-06-29 PROCEDURE — 99422 OL DIG E/M SVC 11-20 MIN: CPT | Performed by: NURSE PRACTITIONER

## 2020-06-29 NOTE — PATIENT INSTRUCTIONS
Allergic Rhinitis, Adult  Allergic rhinitis is a reaction to allergens in the air. Allergens are tiny specks (particles) in the air that cause your body to have an allergic reaction. This condition cannot be passed from person to person (is not contagious). Allergic rhinitis cannot be cured, but it can be controlled.  There are two types of allergic rhinitis:  · Seasonal. This type is also called hay fever. It happens only during certain times of the year.  · Perennial. This type can happen at any time of the year.  What are the causes?  This condition may be caused by:  · Pollen from grasses, trees, and weeds.  · House dust mites.  · Pet dander.  · Mold.  What are the signs or symptoms?  Symptoms of this condition include:  · Sneezing.  · Runny or stuffy nose (nasal congestion).  · A lot of mucus in the back of the throat (postnasal drip).  · Itchy nose.  · Tearing of the eyes.  · Trouble sleeping.  · Being sleepy during day.  How is this treated?  There is no cure for this condition. You should avoid things that trigger your symptoms (allergens). Treatment can help to relieve symptoms. This may include:  · Medicines that block allergy symptoms, such as antihistamines. These may be given as a shot, nasal spray, or pill.  · Shots that are given until your body becomes less sensitive to the allergen (desensitization).  · Stronger medicines, if all other treatments have not worked.  Follow these instructions at home:  Avoiding allergens    · Find out what you are allergic to. Common allergens include smoke, dust, and pollen.  · Avoid them if you can. These are some of the things that you can do to avoid allergens:  ? Replace carpet with wood, tile, or vinyl ora. Carpet can trap dander and dust.  ? Clean any mold found in the home.  ? Do not smoke. Do not allow smoking in your home.  ? Change your heating and air conditioning filter at least once a month.  ? During allergy season:  § Keep windows closed as much as  you can. If possible, use air conditioning when there is a lot of pollen in the air.  § Use a special filter for allergies with your furnace and air conditioner.  § Plan outdoor activities when pollen counts are lowest. This is usually during the early morning or evening hours.  § If you do go outdoors when pollen count is high, wear a special mask for people with allergies.  § When you come indoors, take a shower and change your clothes before sitting on furniture or bedding.  General instructions  · Do not use fans in your home.  · Do not hang clothes outside to dry.  · Wear sunglasses to keep pollen out of your eyes.  · Wash your hands right away after you touch household pets.  · Take over-the-counter and prescription medicines only as told by your doctor.  · Keep all follow-up visits as told by your doctor. This is important.  Contact a doctor if:  · You have a fever.  · You have a cough that does not go away (is persistent).  · You start to make whistling sounds when you breathe (wheeze).  · Your symptoms do not get better with treatment.  · You have thick fluid coming from your nose.  · You start to have nosebleeds.  Get help right away if:  · Your tongue or your lips are swollen.  · You have trouble breathing.  · You feel dizzy or you feel like you are going to pass out (faint).  · You have cold sweats.  Summary  · Allergic rhinitis is a reaction to allergens in the air.  · This condition may be caused by allergens. These include pollen, dust mites, pet dander, and mold.  · Symptoms include a runny, itchy nose, sneezing, or tearing eyes. You may also have trouble sleeping or feel sleepy during the day.  · Treatment includes taking medicines and avoiding allergens. You may also get shots or take stronger medicines.  · Get help if you have a fever or a cough that does not stop. Get help right away if you are short of breath.  · Recommend instant ocean nasal spray prn daily and warm salt water gargles prn    · Patient to follow up with ENT in 1 week for cerumen ear check and will discuss this throat issue at that time.  · Continue with Stahist AD as directed until follow up with ENT  This information is not intended to replace advice given to you by your health care provider. Make sure you discuss any questions you have with your health care provider.  Document Released: 04/18/2012 Document Revised: 04/07/2020 Document Reviewed: 07/09/2019  Elsevier Patient Education © 2020 [employer]

## 2020-06-29 NOTE — PROGRESS NOTES
Subjective   Kaitlynn Le is a 64 y.o. female.     Patient reports having a film over the back of her throat with frequent need to clear of throat that occurs intermittently but rarely at night. This has been present since she had a cold in February. She states wearing a mask and singing make the condition worse. Her GP placed on her Stahist AD about a week ago without relief. She does report seasonal allergies and chronic runny nose issues. She has an appointment with  Her ENT for an ear cerumen check in a week. She denies fever,  sore throat, cough or sinus issues.        The following portions of the patient's history were reviewed and updated as appropriate: She  has a past medical history of Cancer (CMS/[facility]), GERD (gastroesophageal reflux disease) (3/12/2019), History of medical problems (July 2018), History of radiation therapy, Hyperlipidemia, Kidney stones (04/13/2017), Osteoarthritis of knee, Osteopenia of multiple sites (9/19/2018), Osteoporosis, Pes planus, flexible, Tibialis posterior tendinitis, Urinary tract infection (July 2017), and Varicose veins of legs..    Review of Systems   Constitutional: Negative.    HENT: Positive for ear discharge and rhinorrhea. Negative for congestion, ear pain, mouth sores, postnasal drip, sinus pressure, sneezing and sore throat.    Gastrointestinal: Negative.    Allergic/Immunologic: Positive for environmental allergies.   Neurological: Negative.    Hematological: Negative.    Psychiatric/Behavioral: Negative.        Objective   Physical Exam   Constitutional: Vital signs are normal. She appears well-developed and well-nourished. She is cooperative.  Non-toxic appearance. She does not have a sickly appearance. She does not appear ill. No distress.   HENT:   Head: Normocephalic and atraumatic.   Right Ear: Hearing normal.   Left Ear: Hearing normal.   Nose: Rhinorrhea present. Right sinus exhibits no maxillary sinus tenderness and no frontal sinus tenderness. Left  sinus exhibits no maxillary sinus tenderness and no frontal sinus tenderness.   Mouth/Throat: Oropharynx is clear and moist and mucous membranes are normal.   Neck: Normal range of motion. Neck supple. No thyromegaly present.   Per patient assessment    Pulmonary/Chest: Effort normal. No respiratory distress.   Musculoskeletal: She exhibits no edema or tenderness.   No neck tenderness or masses. Full range of motion of neck per patient assessement   Lymphadenopathy:     She has no cervical adenopathy.   Neurological: She is alert.   Vitals reviewed.        Assessment/Plan   Problems Addressed this Visit     None      Visit Diagnoses     Seasonal allergies    -  Primary    Throat clearing

## 2020-07-09 ENCOUNTER — OFFICE VISIT (OUTPATIENT)
Dept: INTERNAL MEDICINE | Facility: CLINIC | Age: 65
End: 2020-07-09

## 2020-07-09 ENCOUNTER — LAB (OUTPATIENT)
Dept: LAB | Facility: HOSPITAL | Age: 65
End: 2020-07-09

## 2020-07-09 VITALS
HEART RATE: 74 BPM | SYSTOLIC BLOOD PRESSURE: 138 MMHG | BODY MASS INDEX: 20.86 KG/M2 | HEIGHT: 67 IN | OXYGEN SATURATION: 96 % | DIASTOLIC BLOOD PRESSURE: 70 MMHG | TEMPERATURE: 97.4 F | WEIGHT: 132.9 LBS

## 2020-07-09 DIAGNOSIS — K43.9 VENTRAL HERNIA WITHOUT OBSTRUCTION OR GANGRENE: Primary | ICD-10-CM

## 2020-07-09 DIAGNOSIS — E78.5 HYPERLIPIDEMIA, UNSPECIFIED HYPERLIPIDEMIA TYPE: ICD-10-CM

## 2020-07-09 PROCEDURE — 80053 COMPREHEN METABOLIC PANEL: CPT

## 2020-07-09 PROCEDURE — 80061 LIPID PANEL: CPT

## 2020-07-09 PROCEDURE — 99214 OFFICE O/P EST MOD 30 MIN: CPT | Performed by: FAMILY MEDICINE

## 2020-07-09 RX ORDER — AZELASTINE 1 MG/ML
SPRAY, METERED NASAL
COMMUNITY
Start: 2020-07-02 | End: 2020-12-01

## 2020-07-09 RX ORDER — FAMOTIDINE 40 MG/1
TABLET, FILM COATED ORAL DAILY
COMMUNITY
Start: 2020-07-02 | End: 2020-12-01

## 2020-07-09 NOTE — PROGRESS NOTES
"Subjective   Kaitlynn Le is a 64 y.o. female.     Chief Complaint   Patient presents with   • Mass     mid abdomin found about a few weeks ago. denies any pain or discomfort, states it's gold \"squishy\" has a bump on her right middle finger       Visit Vitals  /70 (BP Location: Right arm, Patient Position: Sitting, Cuff Size: Adult)   Pulse 74   Temp 97.4 °F (36.3 °C)   Ht 168.9 cm (66.5\")   Wt 60.3 kg (132 lb 14.4 oz)   SpO2 96%   BMI 21.13 kg/m²       Vitals:    07/09/20 0954 07/09/20 1123   BP: 142/80 138/70   BP Location: Right arm Right arm   Patient Position:  Sitting   Cuff Size: Adult Adult   Pulse: 74    Temp: 97.4 °F (36.3 °C)    SpO2: 96%    Weight: 60.3 kg (132 lb 14.4 oz)    Height: 168.9 cm (66.5\")      Wt Readings from Last 3 Encounters:   07/09/20 60.3 kg (132 lb 14.4 oz)   06/29/20 60.3 kg (133 lb)   01/23/20 67.6 kg (149 lb)         History of Present Illness   Pt felt a bump 1 week ago on upper mid abdomen July 2nd.  Pt has been doing some heavy lifting.  Pt states not painful.     Pt has been watching what she is eating and losing weight with diet and exercise.  Pt wants to check cholesterol. Pt has hx of elevated cholesterol in January and wants to see if dietary changes correct that.     Pt has cyst on right middle finger that is deforming nail bed. Pt will see Derm next month.  On a routine visit and will have them check her finger.  The following portions of the patient's history were reviewed and updated as appropriate: allergies, current medications, past family history, past medical history, past social history, past surgical history and problem list.    Past Medical History:   Diagnosis Date   • Cancer (CMS/HCC)    • GERD (gastroesophageal reflux disease) 3/12/2019   • History of medical problems July 2018    Vertigo   • History of radiation therapy    • Hyperlipidemia    • Kidney stones 04/13/2017    bilateral   • Osteoarthritis of knee    • Osteopenia of multiple sites " 9/19/2018   • Osteoporosis    • Pes planus, flexible    • Tibialis posterior tendinitis    • Urinary tract infection July 2017   • Varicose veins of legs       Past Surgical History:   Procedure Laterality Date   • BREAST RECONSTRUCTION Bilateral 2014   • COLONOSCOPY  2/027659   • CYSTOSCOPY  04/13/2017    right ureteral stone Dr Lofton   • CYSTOSCOPY  12/07/2009   • CYSTOSCOPY INSERTION / REMOVAL STENT / STONE  05/01/2009    calcium oxxylate mono   • EXTRACORPOREAL SHOCK WAVE LITHOTRIPSY (ESWL) Left 03/2009   • KNEE ARTHROSCOPY W/ MENISCAL REPAIR Right 2011   • MASTECTOMY MODIFIED RADICAL / SIMPLE / COMPLETE Left 2013    breast cancer   • SIMPLE MASTECTOMY Right 2013    hx breast Ca left   • SKIN BIOPSY  07/2017    mid thoracic back Dr Salinas   • TONSILLECTOMY  09/2015   • WISDOM TOOTH EXTRACTION  2010      Family History   Problem Relation Age of Onset   • Breast cancer Mother    • Mitral valve prolapse Mother    • Heart failure Mother    • Heart disease Mother         Congestive heart failure   • Cancer Mother         Breast cancer   • Mitral valve prolapse Father    • Heart disease Father         Mitral valve replacement   • Cancer Father         Prostate cancer   • Valvular heart disease Father         mitral valve replaced   • Migraines Sister    • Mitral valve prolapse Sister    • Valvular heart disease Sister         mitral valve replaced   • Mitral valve prolapse Brother    • Cancer Brother         Prostate cancer   • Heart disease Brother         Mitral valve replacement   • Valvular heart disease Brother         mitral valve replaced   • Cancer Other       Social History     Socioeconomic History   • Marital status:      Spouse name: Not on file   • Number of children: Not on file   • Years of education: Not on file   • Highest education level: Not on file   Tobacco Use   • Smoking status: Never Smoker   • Smokeless tobacco: Never Used   Substance and Sexual Activity   • Alcohol use: No   • Drug  use: No   • Sexual activity: Yes     Partners: Male     Birth control/protection: Post-menopausal     Comment:       Allergies   Allergen Reactions   • Caffeine Other (See Comments)     Headaches, speeds up heart rate, gets acne with chocolate, keeps her from sleeping   • Ibuprofen GI Intolerance   • Adhesive Tape Rash       Review of Systems   Constitutional: Negative.  Negative for chills, diaphoresis, fatigue and fever.   HENT: Negative.  Negative for ear pain, nosebleeds, postnasal drip, rhinorrhea, sinus pressure, sneezing and sore throat.    Eyes: Negative.  Negative for redness and itching.   Respiratory: Negative.  Negative for cough, shortness of breath and wheezing.    Cardiovascular: Negative.  Negative for chest pain and palpitations.   Gastrointestinal: Negative.  Negative for abdominal pain, constipation, diarrhea, nausea and vomiting.   Endocrine: Negative.  Negative for cold intolerance and heat intolerance.   Genitourinary: Negative.  Negative for dysuria, frequency, hematuria and urgency.   Musculoskeletal: Negative.  Negative for arthralgias, back pain and neck pain.   Skin: Negative.  Negative for color change and rash.        Right middle finger subcutaneous nodule (cyst) at base of nail.   Allergic/Immunologic: Negative.  Negative for environmental allergies.   Neurological: Negative.  Negative for dizziness, syncope, light-headedness and headaches.   Hematological: Negative.  Negative for adenopathy. Does not bruise/bleed easily.   Psychiatric/Behavioral: Negative.  Negative for dysphoric mood and sleep disturbance. The patient is not nervous/anxious.        Objective   Physical Exam   Constitutional: She is oriented to person, place, and time. She appears well-developed.   HENT:   Head: Normocephalic.   Right Ear: External ear normal.   Left Ear: External ear normal.   Nose: Nose normal.   Eyes: Pupils are equal, round, and reactive to light. Conjunctivae, EOM and lids are normal.    Neck: Trachea normal and normal range of motion. Neck supple. Carotid bruit is not present. No thyroid mass and no thyromegaly present.   Cardiovascular: Normal rate and regular rhythm.   No murmur heard.  Pulmonary/Chest: Effort normal and breath sounds normal. No respiratory distress. She has no decreased breath sounds. She has no wheezes. She has no rhonchi. She has no rales. She exhibits no tenderness.   Abdominal: Soft. Bowel sounds are normal. She exhibits mass (3 cm mass just proximal to hernia). There is no tenderness. A hernia is present. Hernia confirmed positive in the ventral area.       Musculoskeletal: Normal range of motion.        Hands:  Neurological: She is alert and oriented to person, place, and time.   Skin: Skin is warm and dry.   Psychiatric: She has a normal mood and affect. Her behavior is normal.   Nursing note and vitals reviewed.      Assessment/Plan  Answers for HPI/ROS submitted by the patient on 7/3/2020   What is the primary reason for your visit?: Other  Please describe your symptoms.: I discovered a round mass above my navel area on July 2nd.  I can actually see it as well as feel it.  Have you had these symptoms before?: No  How long have you been having these symptoms?: 1-4 days  Please list any medications you are currently taking for this condition.: None  Please describe any probable cause for these symptoms. : I have no idea.  The only thing different in my abdominal area is that I have lost about 20 pounds in the last 8-10 months through changing my diet (trying to lower my cholesterol levels through diet) and lots of exercise (all walking).    Kaitlynn was seen today for mass.    Diagnoses and all orders for this visit:    Ventral hernia without obstruction or gangrene  -     CT Abdomen Pelvis Without Contrast; Future  -     Ambulatory Referral to General Surgery    Hyperlipidemia, unspecified hyperlipidemia type  -     Lipid Panel; Future  -     Comprehensive Metabolic  Panel; Future                   Current Outpatient Medications:   •  aspirin 81 MG chewable tablet, Chew 81 mg Daily., Disp: , Rfl:   •  azelastine (ASTELIN) 0.1 % nasal spray, , Disp: , Rfl:   •  Calcium Citrate-Vitamin D (CALCIUM CITRATE + D PO), Take  by mouth., Disp: , Rfl:   •  exemestane (AROMASIN) 25 MG chemo tablet, 1 tablet Daily., Disp: , Rfl:   •  famotidine (PEPCID) 40 MG tablet, Take  by mouth Daily., Disp: , Rfl:   •  metroNIDAZOLE (METROCREAM) 0.75 % cream, As Needed., Disp: , Rfl:   •  nystatin (MYCOSTATIN) 209419 UNIT/GM cream, As Needed., Disp: , Rfl:   •  traZODone (DESYREL) 150 MG tablet, TAKE 1 TABLET BY MOUTH EVERY NIGHT, Disp: 90 tablet, Rfl: 0  •  vitamin B-12 (CYANOCOBALAMIN) 500 MCG tablet, Take 500 mcg by mouth Daily., Disp: , Rfl:     Return if symptoms worsen or fail to improve, for Recheck, Medicare Wellness.

## 2020-07-10 LAB
ALBUMIN SERPL-MCNC: 4.6 G/DL (ref 3.5–5.2)
ALBUMIN/GLOB SERPL: 1.7 G/DL
ALP SERPL-CCNC: 81 U/L (ref 39–117)
ALT SERPL W P-5'-P-CCNC: 15 U/L (ref 1–33)
ANION GAP SERPL CALCULATED.3IONS-SCNC: 12.9 MMOL/L (ref 5–15)
AST SERPL-CCNC: 22 U/L (ref 1–32)
BILIRUB SERPL-MCNC: 0.5 MG/DL (ref 0–1.2)
BUN SERPL-MCNC: 13 MG/DL (ref 8–23)
BUN/CREAT SERPL: 14.9 (ref 7–25)
CALCIUM SPEC-SCNC: 9.6 MG/DL (ref 8.6–10.5)
CHLORIDE SERPL-SCNC: 101 MMOL/L (ref 98–107)
CHOLEST SERPL-MCNC: 235 MG/DL (ref 0–200)
CO2 SERPL-SCNC: 26.1 MMOL/L (ref 22–29)
CREAT SERPL-MCNC: 0.87 MG/DL (ref 0.57–1)
GFR SERPL CREATININE-BSD FRML MDRD: 66 ML/MIN/1.73
GLOBULIN UR ELPH-MCNC: 2.7 GM/DL
GLUCOSE SERPL-MCNC: 87 MG/DL (ref 65–99)
HDLC SERPL-MCNC: 87 MG/DL (ref 40–60)
LDLC SERPL CALC-MCNC: 140 MG/DL (ref 0–100)
LDLC/HDLC SERPL: 1.6 {RATIO}
POTASSIUM SERPL-SCNC: 3.9 MMOL/L (ref 3.5–5.2)
PROT SERPL-MCNC: 7.3 G/DL (ref 6–8.5)
SODIUM SERPL-SCNC: 140 MMOL/L (ref 136–145)
TRIGL SERPL-MCNC: 42 MG/DL (ref 0–150)
VLDLC SERPL-MCNC: 8.4 MG/DL (ref 5–40)

## 2020-07-15 ENCOUNTER — HOSPITAL ENCOUNTER (OUTPATIENT)
Dept: CT IMAGING | Facility: HOSPITAL | Age: 65
Discharge: HOME | End: 2020-07-15
Admitting: FAMILY MEDICINE

## 2020-07-15 DIAGNOSIS — K43.9 VENTRAL HERNIA WITHOUT OBSTRUCTION OR GANGRENE: ICD-10-CM

## 2020-07-15 PROCEDURE — 74176 CT ABD & PELVIS W/O CONTRAST: CPT

## 2020-07-20 DIAGNOSIS — F51.04 PSYCHOPHYSIOLOGICAL INSOMNIA: ICD-10-CM

## 2020-07-21 RX ORDER — TRAZODONE HYDROCHLORIDE 150 MG/1
150 TABLET ORAL NIGHTLY
Qty: 90 TABLET | Refills: 0 | Status: SHIPPED | OUTPATIENT
Start: 2020-07-21 | End: 2020-10-20

## 2020-08-17 ENCOUNTER — APPOINTMENT (OUTPATIENT)
Dept: PREADMISSION TESTING | Facility: HOSPITAL | Age: 65
End: 2020-08-17

## 2020-08-17 LAB
REF LAB TEST METHOD: NORMAL
SARS-COV-2 RNA RESP QL NAA+PROBE: NOT DETECTED

## 2020-08-17 PROCEDURE — U0002 COVID-19 LAB TEST NON-CDC: HCPCS

## 2020-08-17 PROCEDURE — U0004 COV-19 TEST NON-CDC HGH THRU: HCPCS

## 2020-08-17 PROCEDURE — C9803 HOPD COVID-19 SPEC COLLECT: HCPCS

## 2020-10-20 DIAGNOSIS — F51.04 PSYCHOPHYSIOLOGICAL INSOMNIA: ICD-10-CM

## 2020-10-20 RX ORDER — TRAZODONE HYDROCHLORIDE 150 MG/1
150 TABLET ORAL NIGHTLY
Qty: 90 TABLET | Refills: 0 | Status: SHIPPED | OUTPATIENT
Start: 2020-10-20 | End: 2021-01-19 | Stop reason: SDUPTHER

## 2020-12-01 ENCOUNTER — OFFICE VISIT (OUTPATIENT)
Dept: INTERNAL MEDICINE | Facility: CLINIC | Age: 65
End: 2020-12-01

## 2020-12-01 ENCOUNTER — LAB (OUTPATIENT)
Dept: LAB | Facility: HOSPITAL | Age: 65
End: 2020-12-01

## 2020-12-01 VITALS
DIASTOLIC BLOOD PRESSURE: 68 MMHG | BODY MASS INDEX: 21.31 KG/M2 | HEART RATE: 62 BPM | TEMPERATURE: 97.1 F | OXYGEN SATURATION: 98 % | HEIGHT: 67 IN | SYSTOLIC BLOOD PRESSURE: 122 MMHG | WEIGHT: 135.8 LBS

## 2020-12-01 DIAGNOSIS — Z85.3 HISTORY OF CANCER OF LEFT BREAST: ICD-10-CM

## 2020-12-01 DIAGNOSIS — R10.9 ABDOMINAL DISCOMFORT: ICD-10-CM

## 2020-12-01 DIAGNOSIS — R10.9 ABDOMINAL DISCOMFORT: Primary | ICD-10-CM

## 2020-12-01 LAB
ALBUMIN SERPL-MCNC: 4.5 G/DL (ref 3.5–5.2)
ALBUMIN/GLOB SERPL: 1.9 G/DL
ALP SERPL-CCNC: 76 U/L (ref 39–117)
ALT SERPL W P-5'-P-CCNC: 17 U/L (ref 1–33)
AMYLASE SERPL-CCNC: 69 U/L (ref 28–100)
ANION GAP SERPL CALCULATED.3IONS-SCNC: 10.5 MMOL/L (ref 5–15)
AST SERPL-CCNC: 22 U/L (ref 1–32)
BASOPHILS # BLD AUTO: 0.02 10*3/MM3 (ref 0–0.2)
BASOPHILS NFR BLD AUTO: 0.9 % (ref 0–1.5)
BILIRUB SERPL-MCNC: 0.5 MG/DL (ref 0–1.2)
BUN SERPL-MCNC: 10 MG/DL (ref 8–23)
BUN/CREAT SERPL: 12 (ref 7–25)
CALCIUM SPEC-SCNC: 9.4 MG/DL (ref 8.6–10.5)
CHLORIDE SERPL-SCNC: 104 MMOL/L (ref 98–107)
CO2 SERPL-SCNC: 27.5 MMOL/L (ref 22–29)
CREAT SERPL-MCNC: 0.83 MG/DL (ref 0.57–1)
DEPRECATED RDW RBC AUTO: 44 FL (ref 37–54)
EOSINOPHIL # BLD AUTO: 0.11 10*3/MM3 (ref 0–0.4)
EOSINOPHIL NFR BLD AUTO: 4.7 % (ref 0.3–6.2)
ERYTHROCYTE [DISTWIDTH] IN BLOOD BY AUTOMATED COUNT: 11.9 % (ref 12.3–15.4)
GFR SERPL CREATININE-BSD FRML MDRD: 69 ML/MIN/1.73
GLOBULIN UR ELPH-MCNC: 2.4 GM/DL
GLUCOSE SERPL-MCNC: 90 MG/DL (ref 65–99)
HCT VFR BLD AUTO: 38.8 % (ref 34–46.6)
HGB BLD-MCNC: 12.7 G/DL (ref 12–15.9)
IMM GRANULOCYTES # BLD AUTO: 0.01 10*3/MM3 (ref 0–0.05)
IMM GRANULOCYTES NFR BLD AUTO: 0.4 % (ref 0–0.5)
LIPASE SERPL-CCNC: 22 U/L (ref 13–60)
LYMPHOCYTES # BLD AUTO: 0.91 10*3/MM3 (ref 0.7–3.1)
LYMPHOCYTES NFR BLD AUTO: 38.9 % (ref 19.6–45.3)
MCH RBC QN AUTO: 32.3 PG (ref 26.6–33)
MCHC RBC AUTO-ENTMCNC: 32.7 G/DL (ref 31.5–35.7)
MCV RBC AUTO: 98.7 FL (ref 79–97)
MONOCYTES # BLD AUTO: 0.3 10*3/MM3 (ref 0.1–0.9)
MONOCYTES NFR BLD AUTO: 12.8 % (ref 5–12)
NEUTROPHILS NFR BLD AUTO: 0.99 10*3/MM3 (ref 1.7–7)
NEUTROPHILS NFR BLD AUTO: 42.3 % (ref 42.7–76)
NRBC BLD AUTO-RTO: 0 /100 WBC (ref 0–0.2)
PLATELET # BLD AUTO: 180 10*3/MM3 (ref 140–450)
PMV BLD AUTO: 11.6 FL (ref 6–12)
POTASSIUM SERPL-SCNC: 4.1 MMOL/L (ref 3.5–5.2)
PROT SERPL-MCNC: 6.9 G/DL (ref 6–8.5)
RBC # BLD AUTO: 3.93 10*6/MM3 (ref 3.77–5.28)
SODIUM SERPL-SCNC: 142 MMOL/L (ref 136–145)
WBC # BLD AUTO: 2.34 10*3/MM3 (ref 3.4–10.8)

## 2020-12-01 PROCEDURE — 82150 ASSAY OF AMYLASE: CPT | Performed by: FAMILY MEDICINE

## 2020-12-01 PROCEDURE — 83690 ASSAY OF LIPASE: CPT | Performed by: FAMILY MEDICINE

## 2020-12-01 PROCEDURE — 85025 COMPLETE CBC W/AUTO DIFF WBC: CPT | Performed by: FAMILY MEDICINE

## 2020-12-01 PROCEDURE — 99213 OFFICE O/P EST LOW 20 MIN: CPT | Performed by: FAMILY MEDICINE

## 2020-12-01 PROCEDURE — 80053 COMPREHEN METABOLIC PANEL: CPT | Performed by: FAMILY MEDICINE

## 2020-12-01 RX ORDER — CLINDAMYCIN PHOSPHATE AND BENZOYL PEROXIDE 10; 50 MG/G; MG/G
GEL TOPICAL AS NEEDED
COMMUNITY
Start: 2020-11-20 | End: 2022-05-13

## 2020-12-01 NOTE — PROGRESS NOTES
"Subjective   Kaitlynn Le is a 65 y.o. female.     Chief Complaint   Patient presents with   • Abdominal Pain     feels like she is being poked in the center of her abdomen. Has been going on for several months and getting worse.       Visit Vitals  /68 (BP Location: Right arm, Patient Position: Sitting, Cuff Size: Adult)   Pulse 62   Temp 97.1 °F (36.2 °C) (Infrared)   Ht 168.9 cm (66.5\")   Wt 61.6 kg (135 lb 12.8 oz)   SpO2 98%   BMI 21.59 kg/m²         Abdominal Pain  This is a chronic problem. The current episode started more than 1 month ago. The problem occurs constantly. The problem has been unchanged. The pain is located in the RUQ. The pain is at a severity of 3/10 (worse if she eats meat). The pain is mild. Quality: poking sensation. The abdominal pain does not radiate. Associated symptoms include flatus and weight loss (with diet). Pertinent negatives include no anorexia, arthralgias, belching, constipation, diarrhea, dysuria, fever, frequency, headaches, hematochezia, hematuria, melena, myalgias, nausea or vomiting. The pain is aggravated by eating. She has tried nothing for the symptoms. The treatment provided no relief. Her past medical history is significant for abdominal surgery.      Pt has a sensation of pressure in right upper quadrant. That is constant.  Pt is on low cholesterol diet and has lost weight this yr.   Pt had a ventral hernia upper mid abdomen in August and has had increased \"poking\" sensation since then.     The following portions of the patient's history were reviewed and updated as appropriate: allergies, current medications, past family history, past medical history, past social history, past surgical history and problem list.    Past Medical History:   Diagnosis Date   • Cancer (CMS/HCC)    • GERD (gastroesophageal reflux disease) 3/12/2019   • History of medical problems July 2018    Vertigo   • History of radiation therapy    • Hyperlipidemia    • Kidney stones " 04/13/2017    bilateral   • Osteoarthritis of knee    • Osteopenia of multiple sites 9/19/2018   • Osteoporosis    • Pes planus, flexible    • Tibialis posterior tendinitis    • Urinary tract infection July 2017   • Varicose veins of legs       Past Surgical History:   Procedure Laterality Date   • BREAST RECONSTRUCTION Bilateral 2014   • COLONOSCOPY  2/125754   • CYSTOSCOPY  04/13/2017    right ureteral stone Dr Lofton   • CYSTOSCOPY  12/07/2009   • CYSTOSCOPY INSERTION / REMOVAL STENT / STONE  05/01/2009    calcium oxxylate mono   • EXTRACORPOREAL SHOCK WAVE LITHOTRIPSY (ESWL) Left 03/2009   • KNEE ARTHROSCOPY W/ MENISCAL REPAIR Right 2011   • MASTECTOMY MODIFIED RADICAL / SIMPLE / COMPLETE Left 2013    breast cancer   • SIMPLE MASTECTOMY Right 2013    hx breast Ca left   • SKIN BIOPSY  07/2017    mid thoracic back Dr Salinas   • TONSILLECTOMY  09/2015   • WISDOM TOOTH EXTRACTION  2010      Family History   Problem Relation Age of Onset   • Breast cancer Mother    • Mitral valve prolapse Mother    • Heart failure Mother    • Heart disease Mother         Congestive heart failure   • Cancer Mother         Breast cancer   • Mitral valve prolapse Father    • Heart disease Father         Mitral valve replacement   • Cancer Father         Prostate cancer   • Valvular heart disease Father         mitral valve replaced   • Migraines Sister    • Mitral valve prolapse Sister    • Valvular heart disease Sister         mitral valve replaced   • Mitral valve prolapse Brother    • Cancer Brother         Prostate cancer   • Heart disease Brother         Mitral valve replacement   • Valvular heart disease Brother         mitral valve replaced   • Cancer Other       Social History     Socioeconomic History   • Marital status:      Spouse name: Not on file   • Number of children: Not on file   • Years of education: Not on file   • Highest education level: Not on file   Tobacco Use   • Smoking status: Never Smoker   •  Smokeless tobacco: Never Used   Substance and Sexual Activity   • Alcohol use: No   • Drug use: No   • Sexual activity: Yes     Partners: Male     Birth control/protection: Post-menopausal     Comment:       Allergies   Allergen Reactions   • Caffeine Other (See Comments)     Headaches, speeds up heart rate, gets acne with chocolate, keeps her from sleeping   • Ibuprofen GI Intolerance   • Adhesive Tape Rash       Review of Systems   Constitutional: Positive for weight loss (with diet). Negative for chills, diaphoresis, fatigue and fever.   HENT: Negative.  Negative for ear pain, nosebleeds, postnasal drip, rhinorrhea, sinus pressure, sneezing and sore throat.    Eyes: Negative.  Negative for redness and itching.   Respiratory: Negative.  Negative for cough, shortness of breath and wheezing.    Cardiovascular: Negative.  Negative for chest pain and palpitations.   Gastrointestinal: Positive for abdominal pain and flatus. Negative for anorexia, constipation, diarrhea, hematochezia, melena, nausea and vomiting.   Endocrine: Negative.  Negative for cold intolerance and heat intolerance.   Genitourinary: Negative.  Negative for dysuria, frequency, hematuria and urgency.   Musculoskeletal: Negative.  Negative for arthralgias, back pain, myalgias and neck pain.   Skin: Negative.  Negative for color change and rash.   Allergic/Immunologic: Negative.  Negative for environmental allergies.   Neurological: Negative.  Negative for dizziness, syncope, light-headedness and headaches.   Hematological: Negative.  Negative for adenopathy. Does not bruise/bleed easily.   Psychiatric/Behavioral: Negative.  Negative for dysphoric mood. The patient is not nervous/anxious.        Objective   Physical Exam  Vitals signs and nursing note reviewed.   Constitutional:       Appearance: She is well-developed.   HENT:      Head: Normocephalic.      Right Ear: External ear normal.      Left Ear: External ear normal.      Nose: Nose  normal.   Eyes:      General: Lids are normal.      Conjunctiva/sclera: Conjunctivae normal.      Pupils: Pupils are equal, round, and reactive to light.   Neck:      Musculoskeletal: Normal range of motion and neck supple.      Thyroid: No thyroid mass or thyromegaly.      Vascular: No carotid bruit.      Trachea: Trachea normal.   Cardiovascular:      Rate and Rhythm: Normal rate and regular rhythm.      Heart sounds: No murmur.   Pulmonary:      Effort: Pulmonary effort is normal. No respiratory distress.      Breath sounds: Normal breath sounds. No decreased breath sounds, wheezing, rhonchi or rales.   Chest:      Chest wall: No tenderness.   Abdominal:      General: Bowel sounds are normal. There is no distension.      Palpations: Abdomen is soft. There is no mass.      Tenderness: There is no abdominal tenderness. There is no guarding or rebound.      Hernia: No hernia is present.       Musculoskeletal: Normal range of motion.   Skin:     General: Skin is warm and dry.   Neurological:      Mental Status: She is alert and oriented to person, place, and time.   Psychiatric:         Behavior: Behavior normal.         Assessment/Plan   Diagnoses and all orders for this visit:    1. Abdominal discomfort (Primary)  -     US Abdomen Complete; Future  -     Comprehensive Metabolic Panel; Future  -     CBC & Differential; Future  -     Amylase; Future  -     Lipase; Future    2. History of cancer of left breast  -     US Abdomen Complete; Future      Discussed prevnar 13 due soon             Current Outpatient Medications:   •  aspirin 81 MG chewable tablet, Chew 81 mg Daily., Disp: , Rfl:   •  Calcium Citrate-Vitamin D (CALCIUM CITRATE + D PO), Take  by mouth., Disp: , Rfl:   •  exemestane (AROMASIN) 25 MG chemo tablet, 1 tablet Daily., Disp: , Rfl:   •  metroNIDAZOLE (METROCREAM) 0.75 % cream, As Needed., Disp: , Rfl:   •  nystatin (MYCOSTATIN) 198793 UNIT/GM cream, As Needed., Disp: , Rfl:   •  traZODone (DESYREL)  150 MG tablet, TAKE 1 TABLET BY MOUTH EVERY NIGHT, Disp: 90 tablet, Rfl: 0  •  vitamin B-12 (CYANOCOBALAMIN) 500 MCG tablet, Take 500 mcg by mouth Daily., Disp: , Rfl:   •  Clindamycin Phos-Benzoyl Perox 1.2-5 % gel, SHLOMO AA BID IN THE MORNING AND EVENING, Disp: , Rfl:     Return if symptoms worsen or fail to improve, for Recheck, Annual.

## 2020-12-06 DIAGNOSIS — D72.819 LEUKOPENIA, UNSPECIFIED TYPE: Primary | ICD-10-CM

## 2020-12-07 ENCOUNTER — TELEPHONE (OUTPATIENT)
Dept: INTERNAL MEDICINE | Facility: CLINIC | Age: 65
End: 2020-12-07

## 2020-12-07 NOTE — TELEPHONE ENCOUNTER
----- Message from Adela LIN MD sent at 12/6/2020  2:14 PM EST -----  White count slightly low which can happen with some viral infections.  Need to repeat blood count in 2 weeks to make sure this is not a trend.  Orders in the computer to apply count after the 20th.

## 2020-12-15 ENCOUNTER — HOSPITAL ENCOUNTER (OUTPATIENT)
Dept: ULTRASOUND IMAGING | Facility: HOSPITAL | Age: 65
Discharge: HOME | End: 2020-12-15
Admitting: FAMILY MEDICINE

## 2020-12-15 DIAGNOSIS — Z85.3 HISTORY OF CANCER OF LEFT BREAST: ICD-10-CM

## 2020-12-15 DIAGNOSIS — R10.9 ABDOMINAL DISCOMFORT: ICD-10-CM

## 2020-12-15 PROCEDURE — 76700 US EXAM ABDOM COMPLETE: CPT

## 2020-12-21 ENCOUNTER — LAB (OUTPATIENT)
Dept: LAB | Facility: HOSPITAL | Age: 65
End: 2020-12-21

## 2020-12-21 DIAGNOSIS — D72.819 LEUKOPENIA, UNSPECIFIED TYPE: ICD-10-CM

## 2020-12-21 LAB
BASOPHILS # BLD AUTO: 0.03 10*3/MM3 (ref 0–0.2)
BASOPHILS NFR BLD AUTO: 1.2 % (ref 0–1.5)
DEPRECATED RDW RBC AUTO: 44 FL (ref 37–54)
EOSINOPHIL # BLD AUTO: 0.08 10*3/MM3 (ref 0–0.4)
EOSINOPHIL NFR BLD AUTO: 3.3 % (ref 0.3–6.2)
ERYTHROCYTE [DISTWIDTH] IN BLOOD BY AUTOMATED COUNT: 12.2 % (ref 12.3–15.4)
HCT VFR BLD AUTO: 36.3 % (ref 34–46.6)
HGB BLD-MCNC: 12.2 G/DL (ref 12–15.9)
IMM GRANULOCYTES # BLD AUTO: 0 10*3/MM3 (ref 0–0.05)
IMM GRANULOCYTES NFR BLD AUTO: 0 % (ref 0–0.5)
LYMPHOCYTES # BLD AUTO: 0.93 10*3/MM3 (ref 0.7–3.1)
LYMPHOCYTES NFR BLD AUTO: 38.1 % (ref 19.6–45.3)
MCH RBC QN AUTO: 32.9 PG (ref 26.6–33)
MCHC RBC AUTO-ENTMCNC: 33.6 G/DL (ref 31.5–35.7)
MCV RBC AUTO: 97.8 FL (ref 79–97)
MONOCYTES # BLD AUTO: 0.32 10*3/MM3 (ref 0.1–0.9)
MONOCYTES NFR BLD AUTO: 13.1 % (ref 5–12)
NEUTROPHILS NFR BLD AUTO: 1.08 10*3/MM3 (ref 1.7–7)
NEUTROPHILS NFR BLD AUTO: 44.3 % (ref 42.7–76)
NRBC BLD AUTO-RTO: 0 /100 WBC (ref 0–0.2)
PLATELET # BLD AUTO: 177 10*3/MM3 (ref 140–450)
PMV BLD AUTO: 11.1 FL (ref 6–12)
RBC # BLD AUTO: 3.71 10*6/MM3 (ref 3.77–5.28)
WBC # BLD AUTO: 2.44 10*3/MM3 (ref 3.4–10.8)

## 2020-12-21 PROCEDURE — 85025 COMPLETE CBC W/AUTO DIFF WBC: CPT | Performed by: FAMILY MEDICINE

## 2020-12-23 ENCOUNTER — TELEPHONE (OUTPATIENT)
Dept: INTERNAL MEDICINE | Facility: CLINIC | Age: 65
End: 2020-12-23

## 2020-12-23 DIAGNOSIS — D72.819 LEUKOPENIA, UNSPECIFIED TYPE: Primary | ICD-10-CM

## 2021-01-05 NOTE — TELEPHONE ENCOUNTER
NASAL SPRAY PREVIOUSLY PRESCRIBED  IPRATROPIUM .06%   ALSO, MUCUS RELIEF 600 MG TABLET.    PATIENT RETURNED CALL BUT UNABLE TO WARM TRANSFER, NO ANSWER.    Kaitlynn Le     [phone]

## 2021-01-06 RX ORDER — IPRATROPIUM BROMIDE 42 UG/1
2 SPRAY, METERED NASAL 4 TIMES DAILY
Qty: 15 ML | Refills: 5 | Status: SHIPPED | OUTPATIENT
Start: 2021-01-06 | End: 2021-03-25

## 2021-01-06 RX ORDER — GUAIFENESIN 600 MG/1
1200 TABLET, EXTENDED RELEASE ORAL 2 TIMES DAILY
Qty: 60 TABLET | Refills: 5 | Status: SHIPPED | OUTPATIENT
Start: 2021-01-06 | End: 2021-03-25

## 2021-01-06 NOTE — TELEPHONE ENCOUNTER
I don't think she needed the nose spray rx, you had asked this question in the phone notes regarding her lab results from 12/23/20?

## 2021-01-19 DIAGNOSIS — F51.04 PSYCHOPHYSIOLOGICAL INSOMNIA: ICD-10-CM

## 2021-01-19 RX ORDER — TRAZODONE HYDROCHLORIDE 150 MG/1
150 TABLET ORAL NIGHTLY
Qty: 90 TABLET | Refills: 0 | Status: SHIPPED | OUTPATIENT
Start: 2021-01-19 | End: 2021-04-16

## 2021-03-16 ENCOUNTER — APPOINTMENT (OUTPATIENT)
Dept: PREADMISSION TESTING | Facility: HOSPITAL | Age: 66
End: 2021-03-16

## 2021-03-16 PROCEDURE — C9803 HOPD COVID-19 SPEC COLLECT: HCPCS

## 2021-03-16 PROCEDURE — U0004 COV-19 TEST NON-CDC HGH THRU: HCPCS

## 2021-03-17 ENCOUNTER — APPOINTMENT (OUTPATIENT)
Dept: PREADMISSION TESTING | Facility: HOSPITAL | Age: 66
End: 2021-03-17

## 2021-03-17 LAB — SARS-COV-2 RNA NOSE QL NAA+PROBE: NOT DETECTED

## 2021-03-19 ENCOUNTER — HOSPITAL ENCOUNTER (OUTPATIENT)
Facility: HOSPITAL | Age: 66
Setting detail: HOSPITAL OUTPATIENT SURGERY
Discharge: HOME | End: 2021-03-19
Attending: UROLOGY | Admitting: UROLOGY

## 2021-03-19 ENCOUNTER — ANESTHESIA (OUTPATIENT)
Dept: PERIOP | Facility: HOSPITAL | Age: 66
End: 2021-03-19

## 2021-03-19 ENCOUNTER — ANESTHESIA EVENT (OUTPATIENT)
Dept: PERIOP | Facility: HOSPITAL | Age: 66
End: 2021-03-19

## 2021-03-19 VITALS
OXYGEN SATURATION: 98 % | RESPIRATION RATE: 18 BRPM | SYSTOLIC BLOOD PRESSURE: 147 MMHG | BODY MASS INDEX: 21.03 KG/M2 | DIASTOLIC BLOOD PRESSURE: 70 MMHG | TEMPERATURE: 97.5 F | HEIGHT: 67 IN | HEART RATE: 74 BPM | WEIGHT: 134 LBS

## 2021-03-19 DIAGNOSIS — N20.0 KIDNEY STONES: Primary | ICD-10-CM

## 2021-03-19 PROCEDURE — 25010000002 ONDANSETRON PER 1 MG: Performed by: NURSE ANESTHETIST, CERTIFIED REGISTERED

## 2021-03-19 PROCEDURE — 25010000002 CEFOXITIN: Performed by: UROLOGY

## 2021-03-19 PROCEDURE — 25010000002 PROPOFOL 10 MG/ML EMULSION: Performed by: NURSE ANESTHETIST, CERTIFIED REGISTERED

## 2021-03-19 PROCEDURE — 25010000002 DEXAMETHASONE PER 1 MG: Performed by: NURSE ANESTHETIST, CERTIFIED REGISTERED

## 2021-03-19 RX ORDER — CIPROFLOXACIN 500 MG/1
500 TABLET, FILM COATED ORAL 2 TIMES DAILY
Qty: 6 TABLET | Refills: 0 | Status: SHIPPED | OUTPATIENT
Start: 2021-03-19 | End: 2021-03-22

## 2021-03-19 RX ORDER — DEXAMETHASONE SODIUM PHOSPHATE 10 MG/ML
INJECTION INTRAMUSCULAR; INTRAVENOUS AS NEEDED
Status: DISCONTINUED | OUTPATIENT
Start: 2021-03-19 | End: 2021-03-19 | Stop reason: SURG

## 2021-03-19 RX ORDER — DROPERIDOL 2.5 MG/ML
0.62 INJECTION, SOLUTION INTRAMUSCULAR; INTRAVENOUS AS NEEDED
Status: DISCONTINUED | OUTPATIENT
Start: 2021-03-19 | End: 2021-03-19 | Stop reason: HOSPADM

## 2021-03-19 RX ORDER — SODIUM CHLORIDE 0.9 % (FLUSH) 0.9 %
3 SYRINGE (ML) INJECTION EVERY 12 HOURS SCHEDULED
Status: DISCONTINUED | OUTPATIENT
Start: 2021-03-19 | End: 2021-03-19 | Stop reason: HOSPADM

## 2021-03-19 RX ORDER — ONDANSETRON 2 MG/ML
INJECTION INTRAMUSCULAR; INTRAVENOUS AS NEEDED
Status: DISCONTINUED | OUTPATIENT
Start: 2021-03-19 | End: 2021-03-19 | Stop reason: SURG

## 2021-03-19 RX ORDER — FENTANYL CITRATE 50 UG/ML
50 INJECTION, SOLUTION INTRAMUSCULAR; INTRAVENOUS
Status: DISCONTINUED | OUTPATIENT
Start: 2021-03-19 | End: 2021-03-19 | Stop reason: HOSPADM

## 2021-03-19 RX ORDER — HYDROMORPHONE HYDROCHLORIDE 1 MG/ML
0.5 INJECTION, SOLUTION INTRAMUSCULAR; INTRAVENOUS; SUBCUTANEOUS
Status: DISCONTINUED | OUTPATIENT
Start: 2021-03-19 | End: 2021-03-19 | Stop reason: HOSPADM

## 2021-03-19 RX ORDER — MIDAZOLAM HYDROCHLORIDE 1 MG/ML
1 INJECTION INTRAMUSCULAR; INTRAVENOUS
Status: DISCONTINUED | OUTPATIENT
Start: 2021-03-19 | End: 2021-03-19 | Stop reason: HOSPADM

## 2021-03-19 RX ORDER — SODIUM CHLORIDE 0.9 % (FLUSH) 0.9 %
10 SYRINGE (ML) INJECTION EVERY 12 HOURS SCHEDULED
Status: DISCONTINUED | OUTPATIENT
Start: 2021-03-19 | End: 2021-03-19 | Stop reason: HOSPADM

## 2021-03-19 RX ORDER — PROMETHAZINE HYDROCHLORIDE 25 MG/1
25 SUPPOSITORY RECTAL ONCE AS NEEDED
Status: DISCONTINUED | OUTPATIENT
Start: 2021-03-19 | End: 2021-03-19 | Stop reason: HOSPADM

## 2021-03-19 RX ORDER — SODIUM CHLORIDE, SODIUM LACTATE, POTASSIUM CHLORIDE, CALCIUM CHLORIDE 600; 310; 30; 20 MG/100ML; MG/100ML; MG/100ML; MG/100ML
9 INJECTION, SOLUTION INTRAVENOUS CONTINUOUS PRN
Status: DISCONTINUED | OUTPATIENT
Start: 2021-03-19 | End: 2021-03-19 | Stop reason: HOSPADM

## 2021-03-19 RX ORDER — EPHEDRINE SULFATE 50 MG/ML
INJECTION, SOLUTION INTRAVENOUS AS NEEDED
Status: DISCONTINUED | OUTPATIENT
Start: 2021-03-19 | End: 2021-03-19 | Stop reason: SURG

## 2021-03-19 RX ORDER — FAMOTIDINE 20 MG/1
20 TABLET, FILM COATED ORAL
Status: COMPLETED | OUTPATIENT
Start: 2021-03-19 | End: 2021-03-19

## 2021-03-19 RX ORDER — PROMETHAZINE HYDROCHLORIDE 25 MG/1
25 TABLET ORAL ONCE AS NEEDED
Status: DISCONTINUED | OUTPATIENT
Start: 2021-03-19 | End: 2021-03-19 | Stop reason: HOSPADM

## 2021-03-19 RX ORDER — SODIUM CHLORIDE 0.9 % (FLUSH) 0.9 %
3-10 SYRINGE (ML) INJECTION AS NEEDED
Status: DISCONTINUED | OUTPATIENT
Start: 2021-03-19 | End: 2021-03-19 | Stop reason: HOSPADM

## 2021-03-19 RX ORDER — DOCUSATE SODIUM 100 MG/1
100 CAPSULE, LIQUID FILLED ORAL DAILY PRN
Qty: 30 CAPSULE | Refills: 1 | Status: SHIPPED | OUTPATIENT
Start: 2021-03-19 | End: 2021-03-25

## 2021-03-19 RX ORDER — LABETALOL HYDROCHLORIDE 5 MG/ML
5 INJECTION, SOLUTION INTRAVENOUS
Status: DISCONTINUED | OUTPATIENT
Start: 2021-03-19 | End: 2021-03-19 | Stop reason: HOSPADM

## 2021-03-19 RX ORDER — IPRATROPIUM BROMIDE AND ALBUTEROL SULFATE 2.5; .5 MG/3ML; MG/3ML
3 SOLUTION RESPIRATORY (INHALATION) ONCE AS NEEDED
Status: DISCONTINUED | OUTPATIENT
Start: 2021-03-19 | End: 2021-03-19 | Stop reason: HOSPADM

## 2021-03-19 RX ORDER — ONDANSETRON 2 MG/ML
4 INJECTION INTRAMUSCULAR; INTRAVENOUS ONCE AS NEEDED
Status: DISCONTINUED | OUTPATIENT
Start: 2021-03-19 | End: 2021-03-19 | Stop reason: HOSPADM

## 2021-03-19 RX ORDER — HYDROCODONE BITARTRATE AND ACETAMINOPHEN 7.5; 325 MG/1; MG/1
1 TABLET ORAL EVERY 6 HOURS PRN
Qty: 30 TABLET | Refills: 0 | Status: SHIPPED | OUTPATIENT
Start: 2021-03-19 | End: 2021-03-25

## 2021-03-19 RX ORDER — LIDOCAINE HYDROCHLORIDE 10 MG/ML
INJECTION, SOLUTION EPIDURAL; INFILTRATION; INTRACAUDAL; PERINEURAL AS NEEDED
Status: DISCONTINUED | OUTPATIENT
Start: 2021-03-19 | End: 2021-03-19 | Stop reason: SURG

## 2021-03-19 RX ORDER — LIDOCAINE HYDROCHLORIDE 10 MG/ML
0.5 INJECTION, SOLUTION EPIDURAL; INFILTRATION; INTRACAUDAL; PERINEURAL ONCE AS NEEDED
Status: COMPLETED | OUTPATIENT
Start: 2021-03-19 | End: 2021-03-19

## 2021-03-19 RX ORDER — SODIUM CHLORIDE, SODIUM LACTATE, POTASSIUM CHLORIDE, CALCIUM CHLORIDE 600; 310; 30; 20 MG/100ML; MG/100ML; MG/100ML; MG/100ML
INJECTION, SOLUTION INTRAVENOUS CONTINUOUS PRN
Status: DISCONTINUED | OUTPATIENT
Start: 2021-03-19 | End: 2021-03-19 | Stop reason: SURG

## 2021-03-19 RX ORDER — HYDRALAZINE HYDROCHLORIDE 20 MG/ML
5 INJECTION INTRAMUSCULAR; INTRAVENOUS
Status: DISCONTINUED | OUTPATIENT
Start: 2021-03-19 | End: 2021-03-19 | Stop reason: HOSPADM

## 2021-03-19 RX ORDER — HYDROCODONE BITARTRATE AND ACETAMINOPHEN 5; 325 MG/1; MG/1
1 TABLET ORAL ONCE AS NEEDED
Status: DISCONTINUED | OUTPATIENT
Start: 2021-03-19 | End: 2021-03-19 | Stop reason: HOSPADM

## 2021-03-19 RX ORDER — NALOXONE HCL 0.4 MG/ML
0.4 VIAL (ML) INJECTION AS NEEDED
Status: DISCONTINUED | OUTPATIENT
Start: 2021-03-19 | End: 2021-03-19 | Stop reason: HOSPADM

## 2021-03-19 RX ORDER — MIDAZOLAM HYDROCHLORIDE 1 MG/ML
2 INJECTION INTRAMUSCULAR; INTRAVENOUS
Status: DISCONTINUED | OUTPATIENT
Start: 2021-03-19 | End: 2021-03-19 | Stop reason: HOSPADM

## 2021-03-19 RX ORDER — SODIUM CHLORIDE 0.9 % (FLUSH) 0.9 %
10 SYRINGE (ML) INJECTION AS NEEDED
Status: DISCONTINUED | OUTPATIENT
Start: 2021-03-19 | End: 2021-03-19 | Stop reason: HOSPADM

## 2021-03-19 RX ORDER — MIDAZOLAM HYDROCHLORIDE 1 MG/ML
0.5 INJECTION INTRAMUSCULAR; INTRAVENOUS
Status: DISCONTINUED | OUTPATIENT
Start: 2021-03-19 | End: 2021-03-19 | Stop reason: HOSPADM

## 2021-03-19 RX ORDER — DROPERIDOL 2.5 MG/ML
0.62 INJECTION, SOLUTION INTRAMUSCULAR; INTRAVENOUS ONCE AS NEEDED
Status: DISCONTINUED | OUTPATIENT
Start: 2021-03-19 | End: 2021-03-19 | Stop reason: HOSPADM

## 2021-03-19 RX ORDER — PROPOFOL 10 MG/ML
VIAL (ML) INTRAVENOUS AS NEEDED
Status: DISCONTINUED | OUTPATIENT
Start: 2021-03-19 | End: 2021-03-19 | Stop reason: SURG

## 2021-03-19 RX ADMIN — LIDOCAINE HYDROCHLORIDE 0.5 ML: 10 INJECTION, SOLUTION EPIDURAL; INFILTRATION; INTRACAUDAL; PERINEURAL at 08:18

## 2021-03-19 RX ADMIN — CEFOXITIN 2 G: 2 INJECTION, POWDER, FOR SOLUTION INTRAVENOUS at 09:50

## 2021-03-19 RX ADMIN — DEXAMETHASONE SODIUM PHOSPHATE 4 MG: 10 INJECTION INTRAMUSCULAR; INTRAVENOUS at 09:58

## 2021-03-19 RX ADMIN — ONDANSETRON 4 MG: 2 INJECTION INTRAMUSCULAR; INTRAVENOUS at 10:24

## 2021-03-19 RX ADMIN — SODIUM CHLORIDE, POTASSIUM CHLORIDE, SODIUM LACTATE AND CALCIUM CHLORIDE 9 ML/HR: 600; 310; 30; 20 INJECTION, SOLUTION INTRAVENOUS at 08:18

## 2021-03-19 RX ADMIN — FAMOTIDINE 20 MG: 20 TABLET ORAL at 08:18

## 2021-03-19 RX ADMIN — SODIUM CHLORIDE, POTASSIUM CHLORIDE, SODIUM LACTATE AND CALCIUM CHLORIDE: 600; 310; 30; 20 INJECTION, SOLUTION INTRAVENOUS at 09:40

## 2021-03-19 RX ADMIN — EPHEDRINE SULFATE 5 MG: 50 INJECTION, SOLUTION INTRAVENOUS at 10:31

## 2021-03-19 RX ADMIN — LIDOCAINE HYDROCHLORIDE 50 MG: 10 INJECTION, SOLUTION EPIDURAL; INFILTRATION; INTRACAUDAL; PERINEURAL at 09:45

## 2021-03-19 RX ADMIN — PROPOFOL 130 MG: 10 INJECTION, EMULSION INTRAVENOUS at 09:45

## 2021-03-19 RX ADMIN — EPHEDRINE SULFATE 5 MG: 50 INJECTION, SOLUTION INTRAVENOUS at 10:07

## 2021-03-19 NOTE — ANESTHESIA PROCEDURE NOTES
Airway  Urgency: elective    Date/Time: 3/19/2021 9:47 AM  End Time:3/19/2021 9:48 AM  Airway not difficult    General Information and Staff    Patient location during procedure: OR  Anesthesiologist: Jacobo Persaud MD  CRNA: Agustin Ramirez CRNA    Indications and Patient Condition  Indications for airway management: airway protection    Preoxygenated: yes  Mask difficulty assessment: 1 - vent by mask    Final Airway Details  Final airway type: supraglottic airway      Successful airway: I-gel  Size 3    Number of attempts at approach: 1  Assessment: lips, teeth, and gum same as pre-op    Additional Comments  LMA placed without difficulty, ventilation with assist, equal breath sounds and symmetric chest rise and fall

## 2021-03-19 NOTE — ANESTHESIA PREPROCEDURE EVALUATION
Anesthesia Evaluation     Patient summary reviewed and Nursing notes reviewed   no history of anesthetic complications:  NPO Solid Status: > 8 hours  NPO Liquid Status: > 2 hours           Airway   Mallampati: II  TM distance: <3 FB  Neck ROM: full  Possible difficult intubation  Dental - normal exam     Pulmonary     breath sounds clear to auscultation  (+) sleep apnea,   Cardiovascular   Exercise tolerance: good (4-7 METS)    Rhythm: regular  Rate: normal    (+) hyperlipidemia,       Neuro/Psych  (+) dizziness/light headedness,     GI/Hepatic/Renal/Endo    (+)  GERD well controlled,  renal disease stones,     Musculoskeletal     Abdominal   (-) obese    Abdomen: soft.   Substance History      OB/GYN          Other   arthritis,    history of cancer remission                  Anesthesia Plan    ASA 2     general     intravenous induction     Anesthetic plan, all risks, benefits, and alternatives have been provided, discussed and informed consent has been obtained with: patient.    Plan discussed with CRNA.

## 2021-03-19 NOTE — H&P
"[employer]   Physician      Progress Notes   Signed   Encounter Date:  3/13/2021         Related encounter: Scanned Document from 3/13/2021 in  [facility] with [employer]         Associated Documents  Scan on 3/13/2021 by [employer]: PROGRESS NOTES, [facility], [id number]            Last signed by: [employer] at 03/15/21 0837     [facility] Pre-op    Full history and physical note from office is attached.    Ht 170.2 cm (67\")   Wt 60.8 kg (134 lb)   Breastfeeding No   BMI 20.99 kg/m²     Immunizations:  Influenza:  2020  Pneumococcal:  UTD  Tetanus:  Unknown  Covid x2: 2021    LAB Results:  Lab Results   Component Value Date    WBC 2.44 (L) 12/21/2020    HGB 12.2 12/21/2020    HCT 36.3 12/21/2020    MCV 97.8 (H) 12/21/2020     12/21/2020    NEUTROABS 1.08 (L) 12/21/2020    GLUCOSE 90 12/01/2020    BUN 10 12/01/2020    CREATININE 0.83 12/01/2020    EGFRIFNONA 69 12/01/2020    EGFRIFAFRI 76 09/11/2019     12/01/2020    K 4.1 12/01/2020     12/01/2020    CO2 27.5 12/01/2020    CALCIUM 9.4 12/01/2020    ALBUMIN 4.50 12/01/2020    AST 22 12/01/2020    ALT 17 12/01/2020    BILITOT 0.5 12/01/2020       Cancer Staging (if applicable)  Cancer Patient: __ yes __no __unknown__N/A; If yes, clinical stage T:__ N:__M:__, stage group or __N/A      Impression: Kidney stone      Plan: EXTRACORPOREAL SHOCKWAVE LITHOTRIPSY BILATERAL      OJ Rodney   3/19/2021   07:50 EDT  "

## 2021-03-19 NOTE — ANESTHESIA POSTPROCEDURE EVALUATION
Patient: Kaitlynn Le    Procedure Summary     Date: 03/19/21 Room / Location:  [facility] / [facility]    Anesthesia Start: 0940 Anesthesia Stop:     Procedure: EXTRACORPOREAL SHOCKWAVE LITHOTRIPSY BILATERAL (Bilateral ) Diagnosis:     Surgeons: Jayy Lofton Jr., MD Provider: Jacobo Persaud MD    Anesthesia Type: general ASA Status: 2          Anesthesia Type: general    Vitals  No vitals data found for the desired time range.          Post Anesthesia Care and Evaluation    Patient location during evaluation: PACU  Patient participation: complete - patient participated  Level of consciousness: sleepy but conscious  Pain score: 0  Pain management: adequate  Airway patency: patent  Anesthetic complications: No anesthetic complications  PONV Status: none  Cardiovascular status: hemodynamically stable and acceptable  Respiratory status: nonlabored ventilation, acceptable, nasal cannula and spontaneous ventilation  Hydration status: acceptable    Comments: VSS  No anesthesia care post op

## 2021-03-19 NOTE — OP NOTE
EXTRACORPOREAL SHOCKWAVE LITHOTRIPSY  Procedure Note    Kaitlynn Le  3/19/2021    Pre-op Diagnosis:   Bilateral nephrolithiasis    Post-op Diagnosis:     Bilateral nephrolithiasis    Procedure/CPT® Codes:      Procedure(s):  EXTRACORPOREAL SHOCKWAVE LITHOTRIPSY BILATERAL    Surgeon(s):  Jayy Lofton Jr., MD    Anesthesia: General    Staff:   Circulator: Narda Kingston RN; Aspen Hart RN  Scrub Person: Afua Belle  Vendor Representative: Neftali Hoffman      Was needed to assist in this case with retraction, exposure, instrument placement.    Estimated Blood Loss: minimal  Urine Voided: * No values recorded between 3/19/2021  9:41 AM and 3/19/2021 10:55 AM *    Specimens:                None      Drains: * No LDAs found *    Findings: Bilateral nephrolithiasis    Complications: None    History: Patient with bilateral nephrolithiasis after surgical therapy.  Schedule for consent for bilateral shockwave lithotripsy understanding with benefits therein.    Operative Report: After consent was obtained patient brought to the operative suite specialty position.  Anesthesia induced.  She is repositioned in the postop guidance for [secured with 3 stones were treated with a total of 3000 shocks.  There was good fragmentation on fluoroscopy.  The right side was then identified and 1 stone was treated with 3000 shocks with good fragmentation of fluoroscopy.  Patient tolerated procedure well and after anesthesia was reversed she was taken to the recovery room in stable condition.    Jayy Lofton Jr., MD     Date: 3/19/2021  Time: 10:55 EDT

## 2021-03-25 ENCOUNTER — OFFICE VISIT (OUTPATIENT)
Dept: ORTHOPEDIC SURGERY | Facility: CLINIC | Age: 66
End: 2021-03-25

## 2021-03-25 VITALS
HEIGHT: 67 IN | DIASTOLIC BLOOD PRESSURE: 70 MMHG | SYSTOLIC BLOOD PRESSURE: 153 MMHG | WEIGHT: 134 LBS | HEART RATE: 63 BPM | BODY MASS INDEX: 21.03 KG/M2

## 2021-03-25 DIAGNOSIS — M25.561 RIGHT KNEE PAIN, UNSPECIFIED CHRONICITY: Primary | ICD-10-CM

## 2021-03-25 DIAGNOSIS — M23.006 MENISCAL CYST, RIGHT: ICD-10-CM

## 2021-03-25 DIAGNOSIS — M17.12 PRIMARY OSTEOARTHRITIS OF LEFT KNEE: ICD-10-CM

## 2021-03-25 PROCEDURE — 20610 DRAIN/INJ JOINT/BURSA W/O US: CPT | Performed by: ORTHOPAEDIC SURGERY

## 2021-03-25 PROCEDURE — 99214 OFFICE O/P EST MOD 30 MIN: CPT | Performed by: ORTHOPAEDIC SURGERY

## 2021-03-25 RX ORDER — CLINDAMYCIN AND BENZOYL PEROXIDE 10; 50 MG/G; MG/G
GEL TOPICAL
COMMUNITY
Start: 2020-11-18 | End: 2021-05-25

## 2021-03-25 RX ORDER — LIDOCAINE HYDROCHLORIDE 10 MG/ML
3 INJECTION, SOLUTION EPIDURAL; INFILTRATION; INTRACAUDAL; PERINEURAL
Status: COMPLETED | OUTPATIENT
Start: 2021-03-25 | End: 2021-03-25

## 2021-03-25 RX ORDER — TRIAMCINOLONE ACETONIDE 40 MG/ML
40 INJECTION, SUSPENSION INTRA-ARTICULAR; INTRAMUSCULAR
Status: COMPLETED | OUTPATIENT
Start: 2021-03-25 | End: 2021-03-25

## 2021-03-25 RX ORDER — ALUMINUM ZIRCONIUM OCTACHLOROHYDREX GLY 16 G/100G
1 GEL TOPICAL DAILY
COMMUNITY

## 2021-03-25 RX ADMIN — TRIAMCINOLONE ACETONIDE 40 MG: 40 INJECTION, SUSPENSION INTRA-ARTICULAR; INTRAMUSCULAR at 12:24

## 2021-03-25 RX ADMIN — LIDOCAINE HYDROCHLORIDE 3 ML: 10 INJECTION, SOLUTION EPIDURAL; INFILTRATION; INTRACAUDAL; PERINEURAL at 12:24

## 2021-03-25 NOTE — PROGRESS NOTES
Procedure   Large Joint Arthrocentesis: R knee  Date/Time: 3/25/2021 12:24 PM  Consent given by: patient  Timeout: Immediately prior to procedure a time out was called to verify the correct patient, procedure, equipment, support staff and site/side marked as required   Supporting Documentation  Indications: pain   Procedure Details  Location: knee - R knee  Preparation: Patient was prepped and draped in the usual sterile fashion  Needle size: 25 G  Approach: anterolateral  Medications administered: 3 mL lidocaine PF 1% 1 %; 40 mg triamcinolone acetonide 40 MG/ML  Patient tolerance: patient tolerated the procedure well with no immediate complications

## 2021-03-29 ENCOUNTER — OFFICE VISIT (OUTPATIENT)
Dept: GASTROENTEROLOGY | Facility: CLINIC | Age: 66
End: 2021-03-29

## 2021-03-29 VITALS
HEIGHT: 67 IN | WEIGHT: 134.2 LBS | HEART RATE: 91 BPM | SYSTOLIC BLOOD PRESSURE: 173 MMHG | TEMPERATURE: 98.9 F | DIASTOLIC BLOOD PRESSURE: 86 MMHG | BODY MASS INDEX: 21.06 KG/M2

## 2021-03-29 DIAGNOSIS — Z12.11 SCREEN FOR COLON CANCER: ICD-10-CM

## 2021-03-29 DIAGNOSIS — R10.9 TRIGGER POINT OF ABDOMEN: Primary | ICD-10-CM

## 2021-03-29 PROCEDURE — 99214 OFFICE O/P EST MOD 30 MIN: CPT | Performed by: NURSE PRACTITIONER

## 2021-03-29 RX ORDER — SOD SULF/POT CHLORIDE/MAG SULF 1.479 G
1 TABLET ORAL ONCE
Qty: 24 TABLET | Refills: 0 | Status: SHIPPED | OUTPATIENT
Start: 2021-03-29 | End: 2021-03-29

## 2021-03-29 RX ORDER — AMITRIPTYLINE HYDROCHLORIDE 25 MG/1
50 TABLET, FILM COATED ORAL NIGHTLY
Qty: 60 TABLET | Refills: 2 | Status: SHIPPED | OUTPATIENT
Start: 2021-03-29 | End: 2021-04-16 | Stop reason: ALTCHOICE

## 2021-03-29 NOTE — PROGRESS NOTES
"GASTROENTEROLOGY OFFICE NOTE  Kaitlynn Le  [id number]  1955    CARE TEAM  Patient Care Team:  Adela Valdez MD as PCP - General (Family Medicine)  Jahaira Dawn MD as Consulting Physician (Hematology)  Jayy Lofton Jr., MD as Consulting Physician (Urology)  Lv Ramos MD as Consulting Physician (Dermatology)  Ron Pinzon MD as Consulting Physician (Gynecology)  Shyla Rowell as Audiologist  Harsh Moreno MD as Consulting Physician (Orthopedic Surgery)  Lucio Currie APRN as Nurse Practitioner (Nurse Practitioner)  Maggy Carr APRN as Nurse Practitioner (Nurse Practitioner)    Referring Provider: Adela Valdez MD    Chief Complaint   Patient presents with   • Abdominal Pain        HISTORY OF PRESENT ILLNESS:  Ms. Le presents with a greater than 6-month history of right upper abdominal pain that she describes as not actually a pain just feeling as if something is \"poking\" the area.  She can point to the very specific area.  This is a constant sensation that she is more aware of particularly related to her posture.  She reports she has had an ultrasound of the abdomen that was normal and nondiagnostic for the etiology of her pain.  She denies any associated GI symptoms.    She is due for a colonoscopy.  Her last colonoscopy was 5 years ago and was normal but she previously has had colon polyps.  She is concerned about her colonoscopy because she has severe vertigo.  At her last colonoscopy was Dr. Lee she woke up with severe vertigo presumably from repositioning during the procedure.    PAST MEDICAL HISTORY  Past Medical History:   Diagnosis Date   • Cancer (CMS/HCC)     breast   • Elevated cholesterol    • GERD (gastroesophageal reflux disease) 3/12/2019   • History of radiation therapy    • Hyperlipidemia    • Kidney stones 04/13/2017    bilateral   • Osteoarthritis of knee    • Osteopenia of multiple sites 9/19/2018   • " Osteoporosis    • Pes planus, flexible    • Sleep apnea    • Tibialis posterior tendinitis    • Urinary tract infection July 2017   • Varicose veins of legs    • Vertigo         PAST SURGICAL HISTORY  Past Surgical History:   Procedure Laterality Date   • BREAST RECONSTRUCTION Bilateral 2014   • COLONOSCOPY  2/252016   • CYSTOSCOPY  04/13/2017    right ureteral stone Dr Lofton   • CYSTOSCOPY  12/07/2009   • CYSTOSCOPY INSERTION / REMOVAL STENT / STONE  05/01/2009    calcium oxxylate mono   • EXTRACORPOREAL SHOCK WAVE LITHOTRIPSY (ESWL) Left 03/2009   • EXTRACORPOREAL SHOCK WAVE LITHOTRIPSY (ESWL) Bilateral 3/19/2021    Procedure: EXTRACORPOREAL SHOCKWAVE LITHOTRIPSY BILATERAL;  Surgeon: Jayy Lofton Jr., MD;  Location: [facility];  Service: Urology;  Laterality: Bilateral;   • HERNIA REPAIR     • KNEE ARTHROSCOPY W/ MENISCAL REPAIR Right 2011   • MASTECTOMY MODIFIED RADICAL / SIMPLE / COMPLETE Left 2013    breast cancer   • SIMPLE MASTECTOMY Right 2013    hx breast Ca left   • SKIN BIOPSY  07/2017    mid thoracic back Dr Salinas   • TONSILLECTOMY  09/2015   • WISDOM TOOTH EXTRACTION  2010        MEDICATIONS:    Current Outpatient Medications:   •  amitriptyline (ELAVIL) 25 MG tablet, Take 2 tablets by mouth Every Night. 1-2 TAB, Disp: 60 tablet, Rfl: 2  •  Calcium Citrate-Vitamin D (CALCIUM CITRATE + D PO), Take 1 tablet by mouth 2 (Two) Times a Day., Disp: , Rfl:   •  Clindamycin Phos-Benzoyl Perox 1.2-5 % gel, As Needed., Disp: , Rfl:   •  clindamycin-benzoyl peroxide (BENZACLIN) 1-5 % gel, APPLY TO THE AFFECTED AREA(S) ON FACE E 2 TIMES PER DAY, Disp: , Rfl:   •  exemestane (AROMASIN) 25 MG chemo tablet, 1 tablet Daily., Disp: , Rfl:   •  metroNIDAZOLE (METROCREAM) 0.75 % cream, Apply 1 application topically to the appropriate area as directed As Needed., Disp: , Rfl:   •  nystatin (MYCOSTATIN) 798958 UNIT/GM cream, As Needed., Disp: , Rfl:   •  Probiotic Product (Align) capsule, Daily, Disp: , Rfl:   •   Probiotic Product (PROBIOTIC PO), Take  by mouth Daily., Disp: , Rfl:   •  Pseudoephedrine-Naproxen Na (ALEVE-D SINUS & COLD PO), , Disp: , Rfl:   •  traZODone (DESYREL) 150 MG tablet, Take 1 tablet by mouth Every Night., Disp: 90 tablet, Rfl: 0  •  vitamin B-12 (CYANOCOBALAMIN) 500 MCG tablet, Take 500 mcg by mouth 3 (Three) Times a Week., Disp: , Rfl:     ALLERGIES  Allergies   Allergen Reactions   • Caffeine Other (See Comments)     Headaches, speeds up heart rate, gets acne with chocolate, keeps her from sleeping   • Ibuprofen GI Intolerance   • Adhesive Tape Rash     Paper tape       FAMILY HISTORY:  Family History   Problem Relation Age of Onset   • Breast cancer Mother    • Mitral valve prolapse Mother    • Heart failure Mother    • Heart disease Mother         Congestive heart failure   • Cancer Mother         Breast cancer   • Mitral valve prolapse Father    • Heart disease Father         Mitral valve replacement   • Cancer Father         Prostate cancer   • Valvular heart disease Father         mitral valve replaced   • Migraines Sister    • Mitral valve prolapse Sister    • Valvular heart disease Sister         mitral valve replaced   • Mitral valve prolapse Brother    • Cancer Brother         Prostate cancer   • Heart disease Brother         Mitral valve replacement   • Valvular heart disease Brother         mitral valve replaced   • Cancer Other    • Colon cancer Neg Hx    • Colon polyps Neg Hx        SOCIAL HISTORY  Social History     Socioeconomic History   • Marital status:      Spouse name: Not on file   • Number of children: Not on file   • Years of education: Not on file   • Highest education level: Not on file   Tobacco Use   • Smoking status: Never Smoker   • Smokeless tobacco: Never Used   Vaping Use   • Vaping Use: Never used   Substance and Sexual Activity   • Alcohol use: No   • Drug use: No   • Sexual activity: Yes     Partners: Male     Birth control/protection: Post-menopausal      Comment:        REVIEW OF SYSTEMS  Review of Systems   Constitutional: Negative for activity change, appetite change, chills, diaphoresis, fatigue, fever, unexpected weight gain and unexpected weight loss.   HENT: Negative for congestion, dental problem, drooling, ear discharge, ear pain, facial swelling, hearing loss, mouth sores, nosebleeds, postnasal drip, rhinorrhea, sinus pressure, sneezing, sore throat, swollen glands, tinnitus, trouble swallowing and voice change.    Respiratory: Negative for apnea, cough, choking, chest tightness, shortness of breath, wheezing and stridor.    Cardiovascular: Negative for chest pain, palpitations and leg swelling.   Gastrointestinal: Positive for abdominal pain. Negative for abdominal distention, anal bleeding, blood in stool, constipation, diarrhea, nausea, rectal pain, vomiting, GERD and indigestion.   Endocrine: Negative for cold intolerance, heat intolerance, polydipsia, polyphagia and polyuria.   Musculoskeletal: Negative for arthralgias, back pain, gait problem, joint swelling, myalgias, neck pain, neck stiffness and bursitis.   Skin: Negative for color change, dry skin, rash and skin lesions.   Allergic/Immunologic: Negative for environmental allergies, food allergies and immunocompromised state.   Neurological: Negative for dizziness, tremors, seizures, syncope, facial asymmetry, speech difficulty, weakness, light-headedness, numbness, headache, memory problem and confusion.   Hematological: Negative for adenopathy. Does not bruise/bleed easily.   Psychiatric/Behavioral: Negative for agitation, behavioral problems, decreased concentration, dysphoric mood, hallucinations, self-injury, sleep disturbance, suicidal ideas, negative for hyperactivity, depressed mood and stress. The patient is not nervous/anxious.          PHYSICAL EXAM   /86 (BP Location: Right arm, Patient Position: Sitting, Cuff Size: Adult)   Pulse 91   Temp 98.9 °F (37.2 °C) (Temporal)    "Ht 170.2 cm (67.01\")   Wt 60.9 kg (134 lb 3.2 oz)   BMI 21.01 kg/m²   Physical Exam  Constitutional:       General: She is not in acute distress.     Appearance: She is well-developed.   HENT:      Head: Normocephalic and atraumatic.      Nose: Nose normal.   Eyes:      Conjunctiva/sclera: Conjunctivae normal.      Pupils: Pupils are equal, round, and reactive to light.   Pulmonary:      Effort: Pulmonary effort is normal.   Abdominal:       Neurological:      Mental Status: She is alert and oriented to person, place, and time.   Psychiatric:         Behavior: Behavior normal.         Judgment: Judgment normal.       Results Review:  [facility] records reviewed and discussed with patient.     ASSESSMENT / PLAN  1.  Trigger point of the abdomen  -Amitriptyline 25 to 50 mg at bedtime.  Patient is currently taking trazodone for sleep and has been counseled about the interaction of oversedation.  She will plan to hold her trazodone while taking amitriptyline.  2.  Screen for colon cancer  3.  History of colon polyps  -Colonoscopy for colorectal cancer screening    Return in about 6 weeks (around 5/10/2021).    I discussed the patients findings and my recommendations with patient    OJ Kelly                    "

## 2021-04-14 DIAGNOSIS — F51.04 PSYCHOPHYSIOLOGICAL INSOMNIA: ICD-10-CM

## 2021-04-15 NOTE — TELEPHONE ENCOUNTER
Last Office Visit: 12/1/2020  Next Office Visit: no future apt scheduled     Labs completed in past 6 months? yes  Labs completed in past year? yes    Medication: Trazodone   Last Refill Date: 1/19/2021  Quantity: 90  Refills: 0    Pharmacy: Pharmacy:  [id number] [place], [place] - [address] AT [facility] [address] [facility] [phone] [facility] [phone] FX    Please review pended refill request for any changes needed on refills or quantities. Thank you!

## 2021-04-16 RX ORDER — TRAZODONE HYDROCHLORIDE 150 MG/1
150 TABLET ORAL NIGHTLY
Qty: 90 TABLET | Refills: 0 | Status: SHIPPED | OUTPATIENT
Start: 2021-04-16 | End: 2021-07-17

## 2021-04-16 NOTE — TELEPHONE ENCOUNTER
Called and confirmed with patient that she has stopped taking the Amitriptyline at this time. She is just taking trazodone.

## 2021-04-16 NOTE — TELEPHONE ENCOUNTER
Trazodone and Elavil amitriptyline are in the same family.  She was recently started on amitriptyline by GI.  She should be on one of the other but not both.

## 2021-04-22 RX ORDER — SOD SULF/POT CHLORIDE/MAG SULF 1.479 G
12 TABLET ORAL TAKE AS DIRECTED
Qty: 24 TABLET | Refills: 0 | Status: SHIPPED | OUTPATIENT
Start: 2021-04-22 | End: 2021-05-25

## 2021-05-02 ENCOUNTER — APPOINTMENT (OUTPATIENT)
Dept: PREADMISSION TESTING | Facility: HOSPITAL | Age: 66
End: 2021-05-02

## 2021-05-02 PROCEDURE — C9803 HOPD COVID-19 SPEC COLLECT: HCPCS

## 2021-05-02 PROCEDURE — U0004 COV-19 TEST NON-CDC HGH THRU: HCPCS

## 2021-05-03 LAB — SARS-COV-2 RNA NOSE QL NAA+PROBE: NOT DETECTED

## 2021-05-04 ENCOUNTER — OUTSIDE FACILITY SERVICE (OUTPATIENT)
Dept: GASTROENTEROLOGY | Facility: CLINIC | Age: 66
End: 2021-05-04

## 2021-05-04 PROCEDURE — 45378 DIAGNOSTIC COLONOSCOPY: CPT | Performed by: INTERNAL MEDICINE

## 2021-05-25 ENCOUNTER — LAB (OUTPATIENT)
Dept: LAB | Facility: HOSPITAL | Age: 66
End: 2021-05-25

## 2021-05-25 ENCOUNTER — OFFICE VISIT (OUTPATIENT)
Dept: INTERNAL MEDICINE | Facility: CLINIC | Age: 66
End: 2021-05-25

## 2021-05-25 VITALS
TEMPERATURE: 98.6 F | HEIGHT: 67 IN | OXYGEN SATURATION: 98 % | SYSTOLIC BLOOD PRESSURE: 124 MMHG | HEART RATE: 92 BPM | DIASTOLIC BLOOD PRESSURE: 60 MMHG | BODY MASS INDEX: 21.09 KG/M2 | WEIGHT: 134.4 LBS

## 2021-05-25 DIAGNOSIS — E78.00 ELEVATED CHOLESTEROL: ICD-10-CM

## 2021-05-25 DIAGNOSIS — D72.819 LEUKOPENIA, UNSPECIFIED TYPE: ICD-10-CM

## 2021-05-25 DIAGNOSIS — R10.9 FLANK PAIN: Primary | ICD-10-CM

## 2021-05-25 DIAGNOSIS — R10.13 EPIGASTRIC PAIN: ICD-10-CM

## 2021-05-25 DIAGNOSIS — R07.89 LEFT-SIDED CHEST WALL PAIN: ICD-10-CM

## 2021-05-25 DIAGNOSIS — R10.9 FLANK PAIN: ICD-10-CM

## 2021-05-25 LAB
ALBUMIN SERPL-MCNC: 4.7 G/DL (ref 3.5–5.2)
ALBUMIN/GLOB SERPL: 2 G/DL
ALP SERPL-CCNC: 73 U/L (ref 39–117)
ALT SERPL W P-5'-P-CCNC: 13 U/L (ref 1–33)
AMYLASE SERPL-CCNC: 82 U/L (ref 28–100)
ANION GAP SERPL CALCULATED.3IONS-SCNC: 7.5 MMOL/L (ref 5–15)
AST SERPL-CCNC: 20 U/L (ref 1–32)
BASOPHILS # BLD AUTO: 0.02 10*3/MM3 (ref 0–0.2)
BASOPHILS NFR BLD AUTO: 0.5 % (ref 0–1.5)
BILIRUB BLD-MCNC: NEGATIVE MG/DL
BILIRUB SERPL-MCNC: 0.5 MG/DL (ref 0–1.2)
BUN SERPL-MCNC: 17 MG/DL (ref 8–23)
BUN/CREAT SERPL: 19.3 (ref 7–25)
CALCIUM SPEC-SCNC: 9.4 MG/DL (ref 8.6–10.5)
CHLORIDE SERPL-SCNC: 105 MMOL/L (ref 98–107)
CHOLEST SERPL-MCNC: 255 MG/DL (ref 0–200)
CLARITY, POC: CLEAR
CO2 SERPL-SCNC: 30.5 MMOL/L (ref 22–29)
COLOR UR: YELLOW
CREAT SERPL-MCNC: 0.88 MG/DL (ref 0.57–1)
DEPRECATED RDW RBC AUTO: 43.1 FL (ref 37–54)
EOSINOPHIL # BLD AUTO: 0.06 10*3/MM3 (ref 0–0.4)
EOSINOPHIL NFR BLD AUTO: 1.6 % (ref 0.3–6.2)
ERYTHROCYTE [DISTWIDTH] IN BLOOD BY AUTOMATED COUNT: 12.1 % (ref 12.3–15.4)
GFR SERPL CREATININE-BSD FRML MDRD: 64 ML/MIN/1.73
GLOBULIN UR ELPH-MCNC: 2.3 GM/DL
GLUCOSE SERPL-MCNC: 90 MG/DL (ref 65–99)
GLUCOSE UR STRIP-MCNC: NEGATIVE MG/DL
HCT VFR BLD AUTO: 40.1 % (ref 34–46.6)
HDLC SERPL-MCNC: 91 MG/DL (ref 40–60)
HGB BLD-MCNC: 13.9 G/DL (ref 12–15.9)
IMM GRANULOCYTES # BLD AUTO: 0.01 10*3/MM3 (ref 0–0.05)
IMM GRANULOCYTES NFR BLD AUTO: 0.3 % (ref 0–0.5)
KETONES UR QL: NEGATIVE
LDLC SERPL CALC-MCNC: 154 MG/DL (ref 0–100)
LDLC/HDLC SERPL: 1.67 {RATIO}
LEUKOCYTE EST, POC: NEGATIVE
LIPASE SERPL-CCNC: 27 U/L (ref 13–60)
LYMPHOCYTES # BLD AUTO: 0.93 10*3/MM3 (ref 0.7–3.1)
LYMPHOCYTES NFR BLD AUTO: 25.3 % (ref 19.6–45.3)
MCH RBC QN AUTO: 33.5 PG (ref 26.6–33)
MCHC RBC AUTO-ENTMCNC: 34.7 G/DL (ref 31.5–35.7)
MCV RBC AUTO: 96.6 FL (ref 79–97)
MONOCYTES # BLD AUTO: 0.33 10*3/MM3 (ref 0.1–0.9)
MONOCYTES NFR BLD AUTO: 9 % (ref 5–12)
NEUTROPHILS NFR BLD AUTO: 2.32 10*3/MM3 (ref 1.7–7)
NEUTROPHILS NFR BLD AUTO: 63.3 % (ref 42.7–76)
NITRITE UR-MCNC: NEGATIVE MG/ML
NRBC BLD AUTO-RTO: 0 /100 WBC (ref 0–0.2)
PH UR: 6.5 [PH] (ref 5–8)
PLATELET # BLD AUTO: 183 10*3/MM3 (ref 140–450)
PMV BLD AUTO: 11.8 FL (ref 6–12)
POTASSIUM SERPL-SCNC: 4.2 MMOL/L (ref 3.5–5.2)
PROT SERPL-MCNC: 7 G/DL (ref 6–8.5)
PROT UR STRIP-MCNC: NEGATIVE MG/DL
RBC # BLD AUTO: 4.15 10*6/MM3 (ref 3.77–5.28)
RBC # UR STRIP: NEGATIVE /UL
SODIUM SERPL-SCNC: 143 MMOL/L (ref 136–145)
SP GR UR: 1.02 (ref 1–1.03)
TRIGL SERPL-MCNC: 62 MG/DL (ref 0–150)
UROBILINOGEN UR QL: NORMAL
VLDLC SERPL-MCNC: 10 MG/DL (ref 5–40)
WBC # BLD AUTO: 3.67 10*3/MM3 (ref 3.4–10.8)

## 2021-05-25 PROCEDURE — 80061 LIPID PANEL: CPT | Performed by: FAMILY MEDICINE

## 2021-05-25 PROCEDURE — 83690 ASSAY OF LIPASE: CPT | Performed by: FAMILY MEDICINE

## 2021-05-25 PROCEDURE — 81003 URINALYSIS AUTO W/O SCOPE: CPT | Performed by: FAMILY MEDICINE

## 2021-05-25 PROCEDURE — 85025 COMPLETE CBC W/AUTO DIFF WBC: CPT | Performed by: FAMILY MEDICINE

## 2021-05-25 PROCEDURE — 80053 COMPREHEN METABOLIC PANEL: CPT | Performed by: FAMILY MEDICINE

## 2021-05-25 PROCEDURE — 99214 OFFICE O/P EST MOD 30 MIN: CPT | Performed by: FAMILY MEDICINE

## 2021-05-25 PROCEDURE — 87086 URINE CULTURE/COLONY COUNT: CPT | Performed by: FAMILY MEDICINE

## 2021-05-25 PROCEDURE — 82150 ASSAY OF AMYLASE: CPT | Performed by: FAMILY MEDICINE

## 2021-05-25 NOTE — PROGRESS NOTES
"Subjective   Kaitlynn Le is a 65 y.o. female.     Chief Complaint   Patient presents with   • Flank Pain     several months, had lipotripsy several months ago and after the procedure she still has the left sided pain.    • lab work     recheck cbc       Visit Vitals  /60 (BP Location: Right arm, Patient Position: Sitting, Cuff Size: Adult)   Pulse 92   Temp 98.6 °F (37 °C) (Infrared)   Ht 170.2 cm (67.01\")   Wt 61 kg (134 lb 6.4 oz)   SpO2 98%   BMI 21.04 kg/m²         Flank Pain  This is a chronic problem. The current episode started more than 1 month ago. The problem occurs daily. The problem has been waxing and waning since onset. Pain location: left flank pain. The quality of the pain is described as aching. The pain is at a severity of 5/10. The pain is moderate. The pain is worse during the day. Associated symptoms include abdominal pain (RUQ, when she has a big meal). Pertinent negatives include no bladder incontinence, bowel incontinence, chest pain, dysuria, fever, headaches, leg pain, numbness, paresis, paresthesias, pelvic pain, perianal numbness, tingling, weakness or weight loss. Risk factors include history of cancer, history of osteoporosis and menopause. She has tried nothing for the symptoms. The treatment provided no relief.      Pt had lithotripsy on the left side 3/16/21 and passed stones. Pt had repeat CT scan 3 weeks ago and still has left sided pain. Pain is just below rib cage.     Pt had colonoscopy in May.  Pt will get RUQ pain intermittently  The following portions of the patient's history were reviewed and updated as appropriate: allergies, current medications, past family history, past medical history, past social history, past surgical history and problem list.    Past Medical History:   Diagnosis Date   • Cancer (CMS/HCC)     breast   • Elevated cholesterol    • GERD (gastroesophageal reflux disease) 3/12/2019   • History of radiation therapy    • Hyperlipidemia    • Kidney " stones 04/13/2017    bilateral   • Osteoarthritis of knee    • Osteopenia of multiple sites 9/19/2018   • Osteoporosis    • Pes planus, flexible    • Sleep apnea    • Tibialis posterior tendinitis    • Urinary tract infection July 2017   • Varicose veins of legs    • Vertigo       Past Surgical History:   Procedure Laterality Date   • BREAST RECONSTRUCTION Bilateral 2014   • COLONOSCOPY  2/252016   • COLONOSCOPY  05/04/2021    Dr Manning   • CYSTOSCOPY  04/13/2017    right ureteral stone Dr Lofton   • CYSTOSCOPY  12/07/2009   • CYSTOSCOPY INSERTION / REMOVAL STENT / STONE  05/01/2009    calcium oxxylate mono   • EXTRACORPOREAL SHOCK WAVE LITHOTRIPSY (ESWL) Left 03/2009   • EXTRACORPOREAL SHOCK WAVE LITHOTRIPSY (ESWL) Bilateral 3/19/2021    Procedure: EXTRACORPOREAL SHOCKWAVE LITHOTRIPSY BILATERAL;  Surgeon: Jayy Lofton Jr., MD;  Location: [facility];  Service: Urology;  Laterality: Bilateral;   • HERNIA REPAIR     • KNEE ARTHROSCOPY W/ MENISCAL REPAIR Right 2011   • MASTECTOMY MODIFIED RADICAL / SIMPLE / COMPLETE Left 2013    breast cancer   • SIMPLE MASTECTOMY Right 2013    hx breast Ca left   • SKIN BIOPSY  07/2017    mid thoracic back Dr Salinas   • TONSILLECTOMY  09/2015   • WISDOM TOOTH EXTRACTION  2010      Family History   Problem Relation Age of Onset   • Breast cancer Mother    • Mitral valve prolapse Mother    • Heart failure Mother    • Heart disease Mother         Congestive heart failure   • Cancer Mother         Breast cancer   • Mitral valve prolapse Father    • Heart disease Father         Mitral valve replacement   • Cancer Father         Prostate cancer   • Valvular heart disease Father         mitral valve replaced   • Migraines Sister    • Mitral valve prolapse Sister    • Valvular heart disease Sister         mitral valve replaced   • Mitral valve prolapse Brother    • Cancer Brother         Prostate cancer   • Heart disease Brother         Mitral valve replacement   • Valvular  heart disease Brother         mitral valve replaced   • Cancer Other    • Colon cancer Neg Hx    • Colon polyps Neg Hx       Social History     Socioeconomic History   • Marital status:      Spouse name: Not on file   • Number of children: Not on file   • Years of education: Not on file   • Highest education level: Not on file   Tobacco Use   • Smoking status: Never Smoker   • Smokeless tobacco: Never Used   Vaping Use   • Vaping Use: Never used   Substance and Sexual Activity   • Alcohol use: No   • Drug use: No   • Sexual activity: Yes     Partners: Male     Birth control/protection: Post-menopausal     Comment:       Allergies   Allergen Reactions   • Caffeine Other (See Comments)     Headaches, speeds up heart rate, gets acne with chocolate, keeps her from sleeping   • Ibuprofen GI Intolerance   • Adhesive Tape Rash     Paper tape       Review of Systems   Constitutional: Negative for fever and weight loss.   Cardiovascular: Negative for chest pain.   Gastrointestinal: Positive for abdominal pain (RUQ, when she has a big meal). Negative for bowel incontinence.   Genitourinary: Positive for flank pain. Negative for bladder incontinence, dysuria and pelvic pain.   Neurological: Negative for tingling, weakness, numbness, headaches and paresthesias.       Objective   Physical Exam  Vitals and nursing note reviewed.   Constitutional:       Appearance: She is well-developed.   HENT:      Head: Normocephalic.      Right Ear: External ear normal.      Left Ear: External ear normal.      Nose: Nose normal.   Eyes:      General: Lids are normal.      Conjunctiva/sclera: Conjunctivae normal.      Pupils: Pupils are equal, round, and reactive to light.   Neck:      Thyroid: No thyroid mass or thyromegaly.      Vascular: No carotid bruit.      Trachea: Trachea normal.   Cardiovascular:      Rate and Rhythm: Normal rate and regular rhythm.      Heart sounds: No murmur heard.     Pulmonary:      Effort: Pulmonary  effort is normal. No respiratory distress.      Breath sounds: Normal breath sounds. No decreased breath sounds, wheezing, rhonchi or rales.   Chest:      Chest wall: No tenderness.   Abdominal:      General: Bowel sounds are normal.      Palpations: Abdomen is soft. There is no hepatomegaly or splenomegaly.      Tenderness: There is no abdominal tenderness. There is left CVA tenderness. There is no right CVA tenderness.      Hernia: No hernia is present.   Musculoskeletal:         General: Normal range of motion.      Cervical back: Normal range of motion and neck supple.   Skin:     General: Skin is warm and dry.      Findings: No lesion or rash.   Neurological:      Mental Status: She is alert and oriented to person, place, and time.   Psychiatric:         Behavior: Behavior normal.         Assessment/Plan   Diagnoses and all orders for this visit:    1. Flank pain (Primary)  -     Urine Culture - , Urine, Clean Catch; Future  -     POC Urinalysis Dipstick, Automated    2. Leukopenia, unspecified type  -     CBC & Differential; Future    3. Epigastric pain  -     Amylase; Future  -     Lipase; Future    4. Elevated cholesterol  -     Comprehensive Metabolic Panel; Future  -     Lipid Panel; Future    5. Left-sided chest wall pain  -     XR Chest PA & Lateral; Future                   Current Outpatient Medications:   •  Calcium Citrate-Vitamin D (CALCIUM CITRATE + D PO), Take 1 tablet by mouth 2 (Two) Times a Day., Disp: , Rfl:   •  Clindamycin Phos-Benzoyl Perox 1.2-5 % gel, As Needed., Disp: , Rfl:   •  exemestane (AROMASIN) 25 MG chemo tablet, 1 tablet Daily., Disp: , Rfl:   •  metroNIDAZOLE (METROCREAM) 0.75 % cream, Apply 1 application topically to the appropriate area as directed As Needed., Disp: , Rfl:   •  nystatin (MYCOSTATIN) 369505 UNIT/GM cream, As Needed., Disp: , Rfl:   •  Probiotic Product (Align) capsule, Daily, Disp: , Rfl:   •  traZODone (DESYREL) 150 MG tablet, TAKE 1 TABLET BY MOUTH EVERY  NIGHT, Disp: 90 tablet, Rfl: 0  •  vitamin B-12 (CYANOCOBALAMIN) 500 MCG tablet, Take 500 mcg by mouth 3 (Three) Times a Week., Disp: , Rfl:     Return if symptoms worsen or fail to improve, for Recheck.     Recent Results (from the past 168 hour(s))   POC Urinalysis Dipstick, Automated    Collection Time: 05/25/21 11:05 AM    Specimen: Urine   Result Value Ref Range    Color Yellow Yellow, Straw, Dark Yellow, Candice    Clarity, UA Clear Clear    Specific Gravity  1.020 1.005 - 1.030    pH, Urine 6.5 5.0 - 8.0    Leukocytes Negative Negative    Nitrite, UA Negative Negative    Protein, POC Negative Negative mg/dL    Glucose, UA Negative Negative, 1000 mg/dL (3+) mg/dL    Ketones, UA Negative Negative    Urobilinogen, UA Normal Normal    Bilirubin Negative Negative    Blood, UA Negative Negative

## 2021-05-26 ENCOUNTER — HOSPITAL ENCOUNTER (OUTPATIENT)
Dept: GENERAL RADIOLOGY | Facility: HOSPITAL | Age: 66
Discharge: HOME | End: 2021-05-26
Admitting: FAMILY MEDICINE

## 2021-05-26 DIAGNOSIS — R07.89 LEFT-SIDED CHEST WALL PAIN: ICD-10-CM

## 2021-05-26 LAB — BACTERIA SPEC AEROBE CULT: NO GROWTH

## 2021-05-26 PROCEDURE — 71046 X-RAY EXAM CHEST 2 VIEWS: CPT

## 2021-05-28 ENCOUNTER — HOSPITAL ENCOUNTER (OUTPATIENT)
Dept: GENERAL RADIOLOGY | Facility: HOSPITAL | Age: 66
Discharge: HOME | End: 2021-05-28
Admitting: FAMILY MEDICINE

## 2021-05-28 ENCOUNTER — TELEPHONE (OUTPATIENT)
Dept: INTERNAL MEDICINE | Facility: CLINIC | Age: 66
End: 2021-05-28

## 2021-05-28 DIAGNOSIS — R07.89 LEFT-SIDED CHEST WALL PAIN: ICD-10-CM

## 2021-05-28 DIAGNOSIS — R07.89 LEFT-SIDED CHEST WALL PAIN: Primary | ICD-10-CM

## 2021-05-28 PROCEDURE — 71101 X-RAY EXAM UNILAT RIBS/CHEST: CPT

## 2021-05-28 NOTE — TELEPHONE ENCOUNTER
----- Message from Adela LIN MD sent at 5/27/2021  6:05 PM EDT -----  Rest x-ray unremarkable except for degenerative change of the spine.  If chest wall still hurts, we can get dedicated rib x-rays.

## 2021-05-28 NOTE — TELEPHONE ENCOUNTER
PN of results.She stated understanding.  She sys she still has pain at times.  She would like like to get the rib xrays.

## 2021-07-17 DIAGNOSIS — F51.04 PSYCHOPHYSIOLOGICAL INSOMNIA: ICD-10-CM

## 2021-07-17 RX ORDER — TRAZODONE HYDROCHLORIDE 150 MG/1
150 TABLET ORAL NIGHTLY
Qty: 90 TABLET | Refills: 0 | Status: SHIPPED | OUTPATIENT
Start: 2021-07-17 | End: 2021-10-12

## 2021-10-04 ENCOUNTER — TELEPHONE (OUTPATIENT)
Dept: INTERNAL MEDICINE | Facility: CLINIC | Age: 66
End: 2021-10-04

## 2021-10-04 NOTE — TELEPHONE ENCOUNTER
Caller: Kaitlynn Le    Relationship to patient: Self    Best call back number: [phone]    Patient is needing: PATIENT WANTED TO KNOW THE LAST SHINGLES VACCINATION AND THE PNEUMONIA  VACCINATION AS WELL    PLEASE ADVISE

## 2021-10-04 NOTE — TELEPHONE ENCOUNTER
PN that she had both the Shringrix (10/3/19 and 12/5/19). We do not show that she had the pneumonia vaccination.  She stated understanding and thanked us. She is scheduled to have her Covid Booster and flu shot

## 2021-10-12 DIAGNOSIS — F51.04 PSYCHOPHYSIOLOGICAL INSOMNIA: ICD-10-CM

## 2021-10-12 RX ORDER — TRAZODONE HYDROCHLORIDE 150 MG/1
150 TABLET ORAL NIGHTLY
Qty: 90 TABLET | Refills: 0 | Status: SHIPPED | OUTPATIENT
Start: 2021-10-12 | End: 2022-02-14 | Stop reason: SDUPTHER

## 2021-10-27 ENCOUNTER — TELEPHONE (OUTPATIENT)
Dept: ORTHOPEDIC SURGERY | Facility: CLINIC | Age: 66
End: 2021-10-27

## 2021-10-27 NOTE — TELEPHONE ENCOUNTER
Caller: VERO MILLER    Relationship to patient: SELF    Best call back number: [phone]    Chief complaint: RIGHT KNEE    Type of visit: INJECTION    Requested date: ASAP

## 2021-10-28 ENCOUNTER — TELEPHONE (OUTPATIENT)
Dept: ORTHOPEDIC SURGERY | Facility: CLINIC | Age: 66
End: 2021-10-28

## 2021-10-28 NOTE — TELEPHONE ENCOUNTER
Provider: CHARLIE  Caller: VERO  Phone Number: [phone]  Reason for Call: PT NEEDS TO RESCHEDULE HER INJECTION.    SHE IS HOPING FOR ANY TIME BEFORE NOV. 8TH.   WOULD LIKE TO KNOW ASAP.

## 2021-10-29 ENCOUNTER — TELEPHONE (OUTPATIENT)
Dept: ORTHOPEDIC SURGERY | Facility: CLINIC | Age: 66
End: 2021-10-29

## 2021-10-29 NOTE — TELEPHONE ENCOUNTER
Called patient to try an get her scheduled for a possible injection. Please schedule if patient calls back

## 2021-11-02 ENCOUNTER — CLINICAL SUPPORT (OUTPATIENT)
Dept: INTERNAL MEDICINE | Facility: CLINIC | Age: 66
End: 2021-11-02

## 2021-11-02 DIAGNOSIS — Z23 NEED FOR INFLUENZA VACCINATION: Primary | ICD-10-CM

## 2021-11-02 PROCEDURE — 90662 IIV NO PRSV INCREASED AG IM: CPT | Performed by: FAMILY MEDICINE

## 2021-11-02 PROCEDURE — 90471 IMMUNIZATION ADMIN: CPT | Performed by: FAMILY MEDICINE

## 2022-02-14 DIAGNOSIS — F51.04 PSYCHOPHYSIOLOGICAL INSOMNIA: ICD-10-CM

## 2022-02-14 RX ORDER — TRAZODONE HYDROCHLORIDE 150 MG/1
150 TABLET ORAL NIGHTLY
Qty: 30 TABLET | Refills: 0 | Status: SHIPPED | OUTPATIENT
Start: 2022-02-14 | End: 2022-02-18 | Stop reason: SDUPTHER

## 2022-02-14 NOTE — TELEPHONE ENCOUNTER
Rx Refill Note  Requested Prescriptions     Pending Prescriptions Disp Refills   • traZODone (DESYREL) 150 MG tablet 90 tablet 0     Sig: Take 1 tablet by mouth Every Night.      Last office visit with prescribing clinician: 5/25/2021      Next office visit with prescribing clinician: Visit date not found            Earl Dent MA  02/14/22, 15:23 EST

## 2022-02-14 NOTE — TELEPHONE ENCOUNTER
Caller: Kaitlynn Le    Relationship: Self    Best call back number: [phone]     Requested Prescriptions:   Requested Prescriptions     Pending Prescriptions Disp Refills   • traZODone (DESYREL) 150 MG tablet 90 tablet 0     Sig: Take 1 tablet by mouth Every Night.        Pharmacy where request should be sent: [facility] [address] [place] [address] [phone] [facility] [phone] FX     Additional details provided by patient: PATIENT IS OUT OF MEDICATION.  SHE IS GOING OUT OF TOWN ON Wednesday AND WOULD LIKE THIS FILLED BEFORE SHE LEAVES.    Does the patient have less than a 3 day supply:  [x] Yes  [] No    Cristiana Thomas Rep   02/14/22 08:35 EST

## 2022-02-18 ENCOUNTER — PATIENT MESSAGE (OUTPATIENT)
Dept: INTERNAL MEDICINE | Facility: CLINIC | Age: 67
End: 2022-02-18

## 2022-02-18 DIAGNOSIS — F51.04 PSYCHOPHYSIOLOGICAL INSOMNIA: ICD-10-CM

## 2022-02-18 RX ORDER — TRAZODONE HYDROCHLORIDE 150 MG/1
150 TABLET ORAL NIGHTLY
Qty: 30 TABLET | Refills: 0 | Status: SHIPPED | OUTPATIENT
Start: 2022-02-18 | End: 2022-03-18 | Stop reason: SDUPTHER

## 2022-02-18 NOTE — TELEPHONE ENCOUNTER
Barb Jones MA 2/18/2022 10:24 AM EST    Patient wants a 90 day fill on her trazadone. I let her know that the last appointment was in May 2021 and her next appointment is 3/31/22. She states the refill was only for 30 days and she will be out of medication before her appointment.  ----- Message -----  From: Kaitlynn Le  Sent: 2/18/2022 10:16 AM EST  To: [facility] Clinical Pool  Subject: Trazadone prescription     That appointment isn't until March 31st, so I will need the Trazadone refill before I see her that day.

## 2022-02-22 DIAGNOSIS — F51.04 PSYCHOPHYSIOLOGICAL INSOMNIA: ICD-10-CM

## 2022-02-23 RX ORDER — TRAZODONE HYDROCHLORIDE 150 MG/1
TABLET ORAL
Qty: 90 TABLET | OUTPATIENT
Start: 2022-02-23

## 2022-03-18 DIAGNOSIS — F51.04 PSYCHOPHYSIOLOGICAL INSOMNIA: ICD-10-CM

## 2022-03-18 NOTE — TELEPHONE ENCOUNTER
Caller: Kaitlynn Le    Relationship: Self    Best call back number: [phone]    Requested Prescriptions:   Requested Prescriptions     Pending Prescriptions Disp Refills   • traZODone (DESYREL) 150 MG tablet 30 tablet 0     Sig: Take 1 tablet by mouth Every Night.        [facility] [address] [facility] [phone] [facility] [phone] FX     Additional details provided by patient: THE PATIENT STATES THAT SHE THINKS THE MEDICATION WAS SENT TO [facility] HOWEVER SHE HAS CHANGED INSURANCES AND NOW GETS HER MEDICATION FROM [facility] THE PATIENT WOULD LIKE TO HAVE A PRESCRIPTION SENT TO [facility] SHE STATES THAT SHE HAS 4 PILLS LEFT     Does the patient have less than a 3 day supply:  [] Yes  [x] No    Cristiana Méndez Rep   03/18/22 09:50 EDT

## 2022-03-21 RX ORDER — TRAZODONE HYDROCHLORIDE 150 MG/1
150 TABLET ORAL NIGHTLY
Qty: 30 TABLET | Refills: 0 | Status: SHIPPED | OUTPATIENT
Start: 2022-03-21 | End: 2022-03-31 | Stop reason: SDUPTHER

## 2022-03-31 ENCOUNTER — TELEPHONE (OUTPATIENT)
Dept: INTERNAL MEDICINE | Facility: CLINIC | Age: 67
End: 2022-03-31

## 2022-03-31 ENCOUNTER — LAB (OUTPATIENT)
Dept: LAB | Facility: HOSPITAL | Age: 67
End: 2022-03-31

## 2022-03-31 ENCOUNTER — OFFICE VISIT (OUTPATIENT)
Dept: INTERNAL MEDICINE | Facility: CLINIC | Age: 67
End: 2022-03-31

## 2022-03-31 VITALS
HEIGHT: 66 IN | OXYGEN SATURATION: 97 % | DIASTOLIC BLOOD PRESSURE: 72 MMHG | SYSTOLIC BLOOD PRESSURE: 124 MMHG | TEMPERATURE: 97.8 F | BODY MASS INDEX: 22.47 KG/M2 | WEIGHT: 139.8 LBS | HEART RATE: 73 BPM

## 2022-03-31 DIAGNOSIS — Z13.29 SCREENING FOR ENDOCRINE, METABOLIC AND IMMUNITY DISORDER: ICD-10-CM

## 2022-03-31 DIAGNOSIS — E78.5 HYPERLIPIDEMIA, UNSPECIFIED HYPERLIPIDEMIA TYPE: ICD-10-CM

## 2022-03-31 DIAGNOSIS — Z13.0 SCREENING FOR ENDOCRINE, METABOLIC AND IMMUNITY DISORDER: ICD-10-CM

## 2022-03-31 DIAGNOSIS — G47.33 MODERATE OBSTRUCTIVE SLEEP APNEA: ICD-10-CM

## 2022-03-31 DIAGNOSIS — F51.04 PSYCHOPHYSIOLOGICAL INSOMNIA: ICD-10-CM

## 2022-03-31 DIAGNOSIS — Z13.228 SCREENING FOR ENDOCRINE, METABOLIC AND IMMUNITY DISORDER: ICD-10-CM

## 2022-03-31 DIAGNOSIS — Z85.3 HISTORY OF CANCER OF LEFT BREAST: ICD-10-CM

## 2022-03-31 DIAGNOSIS — R06.09 DOE (DYSPNEA ON EXERTION): ICD-10-CM

## 2022-03-31 DIAGNOSIS — Z00.00 MEDICARE ANNUAL WELLNESS VISIT, SUBSEQUENT: Primary | ICD-10-CM

## 2022-03-31 LAB
ALBUMIN SERPL-MCNC: 4.5 G/DL (ref 3.5–5.2)
ALBUMIN/GLOB SERPL: 1.7 G/DL
ALP SERPL-CCNC: 76 U/L (ref 39–117)
ALT SERPL W P-5'-P-CCNC: 14 U/L (ref 1–33)
ANION GAP SERPL CALCULATED.3IONS-SCNC: 9.8 MMOL/L (ref 5–15)
AST SERPL-CCNC: 19 U/L (ref 1–32)
BASOPHILS # BLD AUTO: 0.02 10*3/MM3 (ref 0–0.2)
BASOPHILS NFR BLD AUTO: 0.5 % (ref 0–1.5)
BILIRUB BLD-MCNC: NEGATIVE MG/DL
BILIRUB SERPL-MCNC: 0.6 MG/DL (ref 0–1.2)
BUN SERPL-MCNC: 14 MG/DL (ref 8–23)
BUN/CREAT SERPL: 14.9 (ref 7–25)
CALCIUM SPEC-SCNC: 9.6 MG/DL (ref 8.6–10.5)
CHLORIDE SERPL-SCNC: 105 MMOL/L (ref 98–107)
CHOLEST SERPL-MCNC: 260 MG/DL (ref 0–200)
CLARITY, POC: CLEAR
CO2 SERPL-SCNC: 27.2 MMOL/L (ref 22–29)
COLOR UR: YELLOW
CREAT SERPL-MCNC: 0.94 MG/DL (ref 0.57–1)
DEPRECATED RDW RBC AUTO: 42.1 FL (ref 37–54)
EGFRCR SERPLBLD CKD-EPI 2021: 67.1 ML/MIN/1.73
EOSINOPHIL # BLD AUTO: 0.04 10*3/MM3 (ref 0–0.4)
EOSINOPHIL NFR BLD AUTO: 1 % (ref 0.3–6.2)
ERYTHROCYTE [DISTWIDTH] IN BLOOD BY AUTOMATED COUNT: 12.1 % (ref 12.3–15.4)
EXPIRATION DATE: NORMAL
GLOBULIN UR ELPH-MCNC: 2.6 GM/DL
GLUCOSE SERPL-MCNC: 92 MG/DL (ref 65–99)
GLUCOSE UR STRIP-MCNC: NEGATIVE MG/DL
HCT VFR BLD AUTO: 39.6 % (ref 34–46.6)
HDLC SERPL-MCNC: 96 MG/DL (ref 40–60)
HGB BLD-MCNC: 13.4 G/DL (ref 12–15.9)
IMM GRANULOCYTES # BLD AUTO: 0 10*3/MM3 (ref 0–0.05)
IMM GRANULOCYTES NFR BLD AUTO: 0 % (ref 0–0.5)
KETONES UR QL: NEGATIVE
LDLC SERPL CALC-MCNC: 158 MG/DL (ref 0–100)
LDLC/HDLC SERPL: 1.62 {RATIO}
LEUKOCYTE EST, POC: NEGATIVE
LYMPHOCYTES # BLD AUTO: 0.97 10*3/MM3 (ref 0.7–3.1)
LYMPHOCYTES NFR BLD AUTO: 23.9 % (ref 19.6–45.3)
Lab: NORMAL
MCH RBC QN AUTO: 32.4 PG (ref 26.6–33)
MCHC RBC AUTO-ENTMCNC: 33.8 G/DL (ref 31.5–35.7)
MCV RBC AUTO: 95.7 FL (ref 79–97)
MONOCYTES # BLD AUTO: 0.32 10*3/MM3 (ref 0.1–0.9)
MONOCYTES NFR BLD AUTO: 7.9 % (ref 5–12)
NEUTROPHILS NFR BLD AUTO: 2.71 10*3/MM3 (ref 1.7–7)
NEUTROPHILS NFR BLD AUTO: 66.7 % (ref 42.7–76)
NITRITE UR-MCNC: NEGATIVE MG/ML
NRBC BLD AUTO-RTO: 0 /100 WBC (ref 0–0.2)
PH UR: 6 [PH] (ref 5–8)
PLATELET # BLD AUTO: 201 10*3/MM3 (ref 140–450)
PMV BLD AUTO: 11 FL (ref 6–12)
POTASSIUM SERPL-SCNC: 4 MMOL/L (ref 3.5–5.2)
PROT SERPL-MCNC: 7.1 G/DL (ref 6–8.5)
PROT UR STRIP-MCNC: NEGATIVE MG/DL
RBC # BLD AUTO: 4.14 10*6/MM3 (ref 3.77–5.28)
RBC # UR STRIP: NEGATIVE /UL
SODIUM SERPL-SCNC: 142 MMOL/L (ref 136–145)
SP GR UR: 1.01 (ref 1–1.03)
TRIGL SERPL-MCNC: 42 MG/DL (ref 0–150)
TSH SERPL DL<=0.05 MIU/L-ACNC: 2.19 UIU/ML (ref 0.27–4.2)
UROBILINOGEN UR QL: NORMAL
VLDLC SERPL-MCNC: 6 MG/DL (ref 5–40)
WBC NRBC COR # BLD: 4.06 10*3/MM3 (ref 3.4–10.8)

## 2022-03-31 PROCEDURE — G0439 PPPS, SUBSEQ VISIT: HCPCS | Performed by: FAMILY MEDICINE

## 2022-03-31 PROCEDURE — 84443 ASSAY THYROID STIM HORMONE: CPT | Performed by: FAMILY MEDICINE

## 2022-03-31 PROCEDURE — 85025 COMPLETE CBC W/AUTO DIFF WBC: CPT | Performed by: FAMILY MEDICINE

## 2022-03-31 PROCEDURE — 1170F FXNL STATUS ASSESSED: CPT | Performed by: FAMILY MEDICINE

## 2022-03-31 PROCEDURE — 1126F AMNT PAIN NOTED NONE PRSNT: CPT | Performed by: FAMILY MEDICINE

## 2022-03-31 PROCEDURE — 1160F RVW MEDS BY RX/DR IN RCRD: CPT | Performed by: FAMILY MEDICINE

## 2022-03-31 PROCEDURE — 80053 COMPREHEN METABOLIC PANEL: CPT | Performed by: FAMILY MEDICINE

## 2022-03-31 PROCEDURE — 80061 LIPID PANEL: CPT | Performed by: FAMILY MEDICINE

## 2022-03-31 PROCEDURE — 81003 URINALYSIS AUTO W/O SCOPE: CPT | Performed by: FAMILY MEDICINE

## 2022-03-31 RX ORDER — TRIAMCINOLONE ACETONIDE 1 MG/ML
LOTION TOPICAL DAILY PRN
COMMUNITY
End: 2022-06-09

## 2022-03-31 RX ORDER — TRAZODONE HYDROCHLORIDE 150 MG/1
150 TABLET ORAL NIGHTLY
Qty: 90 TABLET | Refills: 1 | Status: SHIPPED | OUTPATIENT
Start: 2022-03-31 | End: 2022-05-10

## 2022-03-31 NOTE — PROGRESS NOTES
The ABCs of the Annual Wellness Visit  Subsequent Medicare Wellness Visit    Chief Complaint   Patient presents with   • Medicare Wellness-subsequent     Fasting        Subjective    History of Present Illness:  Kaitlynn Le is a 66 y.o. female who presents for a Subsequent Medicare Wellness Visit.    Pt has had double mastectomy for breast cancer.  Pt had benign colonoscopy last yr.  Pt had bone density last yr at [facility].    Pt has had multiple family members with mitral valve problems in their 60's and 70's.  Pt has been walking regularly and recently is SOB climbing stairs.   The following portions of the patient's history were reviewed and   updated as appropriate: allergies, current medications, past family history, past medical history, past social history, past surgical history and problem list.    Past Medical History:   Diagnosis Date   • Cancer (HCC)     breast   • Elevated cholesterol    • GERD (gastroesophageal reflux disease) 3/12/2019   • History of radiation therapy    • Hyperlipidemia    • Kidney stones 04/13/2017    bilateral   • Osteoarthritis of knee    • Osteopenia of multiple sites 9/19/2018   • Osteoporosis    • Pes planus, flexible    • Sleep apnea    • Tibialis posterior tendinitis    • Urinary tract infection July 2017   • Varicose veins of legs    • Vertigo        Past Surgical History:   Procedure Laterality Date   • BREAST RECONSTRUCTION Bilateral 2014   • COLONOSCOPY  2/773111   • COLONOSCOPY  05/04/2021    Dr Manning   • CYSTOSCOPY  04/13/2017    right ureteral stone Dr Lofton   • CYSTOSCOPY  12/07/2009   • CYSTOSCOPY INSERTION / REMOVAL STENT / STONE  05/01/2009    calcium oxxylate mono   • EXTRACORPOREAL SHOCK WAVE LITHOTRIPSY (ESWL) Left 03/2009   • EXTRACORPOREAL SHOCK WAVE LITHOTRIPSY (ESWL) Bilateral 3/19/2021    Procedure: EXTRACORPOREAL SHOCKWAVE LITHOTRIPSY BILATERAL;  Surgeon: Jayy Lofton Jr., MD;  Location: [facility];  Service: Urology;   Laterality: Bilateral;   • HERNIA REPAIR     • KNEE ARTHROSCOPY W/ MENISCAL REPAIR Right 2011   • MASTECTOMY MODIFIED RADICAL / SIMPLE / COMPLETE Left 2013    breast cancer   • SIMPLE MASTECTOMY Right 2013    hx breast Ca left   • SKIN BIOPSY  07/2017    mid thoracic back Dr Salinas   • TONSILLECTOMY  09/2015   • WISDOM TOOTH EXTRACTION  2010       Allergies   Allergen Reactions   • Caffeine Other (See Comments)     Headaches, speeds up heart rate, gets acne with chocolate, keeps her from sleeping   • Ibuprofen GI Intolerance   • Adhesive Tape Rash     Paper tape       Family History   Problem Relation Age of Onset   • Breast cancer Mother    • Mitral valve prolapse Mother    • Heart failure Mother    • Heart disease Mother         Congestive heart failure   • Cancer Mother         Breast cancer   • Mitral valve prolapse Father    • Heart disease Father         Mitral valve replacement  1984   • Cancer Father         Prostate cancer   • Valvular heart disease Father         mitral valve replaced   • Migraines Sister    • Mitral valve prolapse Sister    • Valvular heart disease Sister         mitral valve replaced   • Mitral valve prolapse Brother    • Cancer Brother         Prostate cancer   • Heart disease Brother         Mitral valve replacement  2005   • Valvular heart disease Brother         mitral valve replaced   • Cancer Other    • Heart disease Sister         Mitral valve replacement  2020   • Colon cancer Neg Hx    • Colon polyps Neg Hx        Social History     Socioeconomic History   • Marital status:    Tobacco Use   • Smoking status: Never Smoker   • Smokeless tobacco: Never Used   Vaping Use   • Vaping Use: Never used   Substance and Sexual Activity   • Alcohol use: No   • Drug use: No   • Sexual activity: Yes     Partners: Male     Birth control/protection: Post-menopausal     Comment:        Compared to one year ago, the patient feels her physical   health is worse. Neck pain and  WALKER    Compared to one year ago, the patient feels her mental   health is the same.    Recent Hospitalizations:  She was not admitted to the hospital during the last year.       Current Medical Providers:  Patient Care Team:  Adela Valdez MD as PCP - General (Family Medicine)  Jahaira Dawn MD as Consulting Physician (Hematology)  Jayy Lofton Jr., MD as Consulting Physician (Urology)  Lv Ramos MD as Consulting Physician (Dermatology)  Ron Pinzon MD (Inactive) as Consulting Physician (Gynecology)  Shyla Rowell as Audiologist  Harsh Moreno MD as Consulting Physician (Orthopedic Surgery)  Lucio Currie APRN as Nurse Practitioner (Nurse Practitioner)  Maggy Carr APRN as Nurse Practitioner (Nurse Practitioner)  Jey Diaz MD as Consulting Physician (Otolaryngology)  Sebastian Lee MD as Consulting Physician (Gastroenterology)    Outpatient Medications Prior to Visit   Medication Sig Dispense Refill   • Calcium Citrate-Vitamin D (CALCIUM CITRATE + D PO) Take 1 tablet by mouth 2 (Two) Times a Day.     • Clindamycin Phos-Benzoyl Perox 1.2-5 % gel As Needed.     • exemestane (AROMASIN) 25 MG chemo tablet 1 tablet Daily.     • hydrocortisone 2.5 % cream As Needed.     • metroNIDAZOLE (METROCREAM) 0.75 % cream Apply 1 application topically to the appropriate area as directed As Needed.     • nystatin (MYCOSTATIN) 576624 UNIT/GM cream As Needed.     • Probiotic Product (Align) capsule Daily     • vitamin B-12 (CYANOCOBALAMIN) 500 MCG tablet Take 500 mcg by mouth 3 (Three) Times a Week.     • traZODone (DESYREL) 150 MG tablet Take 1 tablet by mouth Every Night. 30 tablet 0   • triamcinolone (KENALOG) 0.1 % lotion Apply  topically to the appropriate area as directed Daily As Needed.       No facility-administered medications prior to visit.       No opioid medication identified on active medication list. I have reviewed chart for  other potential  high risk medication/s and harmful drug interactions in the elderly.          Aspirin is not on active medication list.  Aspirin use is not indicated based on review of current medical condition/s. Risk of harm outweighs potential benefits.  .    Patient Active Problem List   Diagnosis   • Kidney stones   • History of cancer of left breast   • Iron deficiency   • Hyperlipidemia   • Varicose veins of legs   • Moderate obstructive sleep apnea   • Retrognathia   • Psychophysiological insomnia   • Osteopenia of multiple sites   • GERD (gastroesophageal reflux disease)     Advance Care Planning  Advance Directive is not on file.  ACP discussion was held with the patient during this visit. is on file from 2017    Review of Systems   Constitutional: Negative for activity change, appetite change, chills, diaphoresis, fatigue and fever.   HENT: Negative for congestion, dental problem, drooling, ear discharge, ear pain, facial swelling, hearing loss, mouth sores, nosebleeds, postnasal drip, rhinorrhea, sinus pressure, sneezing, sore throat, tinnitus, trouble swallowing and voice change.    Eyes: Negative for photophobia, pain, discharge, redness, itching and visual disturbance.   Respiratory: Positive for apnea (using CPAP) and shortness of breath (short of breath when climbing stairs). Negative for cough, choking, chest tightness, wheezing and stridor.    Cardiovascular: Negative for chest pain, palpitations and leg swelling.   Gastrointestinal: Negative for abdominal distention, abdominal pain, anal bleeding, blood in stool, constipation, diarrhea, nausea, rectal pain and vomiting.   Endocrine: Negative for cold intolerance, heat intolerance, polydipsia, polyphagia and polyuria.   Genitourinary: Negative for decreased urine volume, difficulty urinating, dyspareunia, dysuria, enuresis, flank pain, frequency, genital sores, hematuria, menstrual problem, pelvic pain, urgency, vaginal bleeding, vaginal  "discharge and vaginal pain.   Musculoskeletal: Positive for gait problem (going up stairs) and neck stiffness (right side that is intermittent aching). Negative for arthralgias, back pain, joint swelling, myalgias and neck pain.   Skin: Negative for color change, pallor, rash and wound.   Allergic/Immunologic: Negative for environmental allergies, food allergies and immunocompromised state.   Neurological: Negative for dizziness, tremors, seizures, syncope, facial asymmetry, speech difficulty, weakness, light-headedness, numbness and confusion.   Hematological: Negative for adenopathy. Does not bruise/bleed easily.   Psychiatric/Behavioral: Negative for agitation, behavioral problems, decreased concentration, dysphoric mood, hallucinations, self-injury, sleep disturbance and suicidal ideas. The patient is not nervous/anxious and is not hyperactive.         Objective    Vitals:    03/31/22 0901   BP: 124/72   Pulse: 73   Temp: 97.8 °F (36.6 °C)   SpO2: 97%   Weight: 63.4 kg (139 lb 12.8 oz)   Height: 168.7 cm (66.4\")   PainSc: 0-No pain     Wt Readings from Last 3 Encounters:   03/31/22 63.4 kg (139 lb 12.8 oz)   05/25/21 61 kg (134 lb 6.4 oz)   03/29/21 60.9 kg (134 lb 3.2 oz)       BMI Readings from Last 1 Encounters:   03/31/22 22.29 kg/m²   BMI is within normal parameters. No follow-up required.    Does the patient have evidence of cognitive impairment? No    Physical Exam  Vitals and nursing note reviewed.   Constitutional:       General: She is not in acute distress.     Appearance: She is well-developed. She is not diaphoretic.   HENT:      Head: Normocephalic and atraumatic.      Right Ear: External ear normal. Decreased hearing noted. There is impacted cerumen.      Left Ear: Tympanic membrane, ear canal and external ear normal. Decreased hearing noted.      Ears:      Comments: Bilateral hearing aids     Nose: Nose normal.      Mouth/Throat:      Lips: Pink.      Mouth: Mucous membranes are moist. Mucous " membranes are not pale, not dry and not cyanotic. No injury.      Dentition: Normal dentition.      Tongue: No lesions.      Palate: No mass.      Pharynx: Uvula midline. No pharyngeal swelling, oropharyngeal exudate, posterior oropharyngeal erythema or uvula swelling.   Eyes:      General: Lids are normal. No scleral icterus.        Right eye: No foreign body, discharge or hordeolum.         Left eye: No foreign body, discharge or hordeolum.      Extraocular Movements:      Right eye: Normal extraocular motion and no nystagmus.      Left eye: Normal extraocular motion and no nystagmus.      Conjunctiva/sclera: Conjunctivae normal.      Right eye: Right conjunctiva is not injected. No chemosis, exudate or hemorrhage.     Left eye: Left conjunctiva is not injected. No chemosis, exudate or hemorrhage.     Pupils: Pupils are equal, round, and reactive to light.   Neck:      Thyroid: No thyroid mass or thyromegaly.      Vascular: No carotid bruit.      Trachea: Trachea normal. No tracheal deviation.     Cardiovascular:      Rate and Rhythm: Normal rate and regular rhythm.      Pulses:           Dorsalis pedis pulses are 2+ on the right side and 2+ on the left side.        Posterior tibial pulses are 2+ on the right side and 2+ on the left side.      Heart sounds: Normal heart sounds. No murmur heard.    No friction rub. No gallop.   Pulmonary:      Effort: Pulmonary effort is normal. No respiratory distress.      Breath sounds: Normal breath sounds. No decreased breath sounds, wheezing, rhonchi or rales.   Chest:      Chest wall: No tenderness. There is no dullness to percussion.   Breasts:      Right: No axillary adenopathy or supraclavicular adenopathy.      Left: No axillary adenopathy or supraclavicular adenopathy.        Comments: Bilateral breast reconstruction with implants  Abdominal:      General: Bowel sounds are normal. There is no distension.      Palpations: Abdomen is soft. There is no hepatomegaly,  splenomegaly or mass.      Tenderness: There is no abdominal tenderness. There is no guarding or rebound.      Hernia: No hernia is present.   Musculoskeletal:         General: No tenderness or deformity. Normal range of motion.      Cervical back: Normal range of motion and neck supple. Muscular tenderness present.   Lymphadenopathy:      Head:      Right side of head: No submandibular adenopathy.      Left side of head: No submandibular adenopathy.      Cervical: No cervical adenopathy.      Right cervical: No superficial, deep or posterior cervical adenopathy.     Left cervical: No superficial, deep or posterior cervical adenopathy.      Upper Body:      Right upper body: No supraclavicular, axillary or pectoral adenopathy.      Left upper body: No supraclavicular, axillary or pectoral adenopathy.   Skin:     General: Skin is warm and dry.      Findings: No rash.      Nails: There is no clubbing.   Neurological:      Mental Status: She is alert and oriented to person, place, and time.      Cranial Nerves: No cranial nerve deficit.      Sensory: No sensory deficit.      Coordination: Coordination normal.      Deep Tendon Reflexes: Reflexes are normal and symmetric.      Reflex Scores:       Bicep reflexes are 2+ on the right side and 2+ on the left side.       Brachioradialis reflexes are 2+ on the right side and 2+ on the left side.       Patellar reflexes are 2+ on the right side and 2+ on the left side.  Psychiatric:         Attention and Perception: Attention normal.         Mood and Affect: Mood and affect normal.         Speech: Speech normal.         Behavior: Behavior normal.         Thought Content: Thought content normal.         Cognition and Memory: Cognition normal.         Judgment: Judgment normal.       Lab Results   Component Value Date    TRIG 42 03/31/2022    HDL 96 (H) 03/31/2022     (H) 03/31/2022    VLDL 6 03/31/2022            HEALTH RISK ASSESSMENT    Smoking Status:  Social History      Tobacco Use   Smoking Status Never Smoker   Smokeless Tobacco Never Used     Alcohol Consumption:  Social History     Substance and Sexual Activity   Alcohol Use No     Fall Risk Screen:    STEADI Fall Risk Assessment was completed, and patient is at LOW risk for falls.Assessment completed on:3/31/2022    Depression Screening:  PHQ-2/PHQ-9 Depression Screening 3/31/2022   Retired PHQ-9 Total Score -   Retired Total Score -   Little Interest or Pleasure in Doing Things 0-->not at all   Feeling Down, Depressed or Hopeless 0-->not at all   PHQ-9: Brief Depression Severity Measure Score 0       Health Habits and Functional and Cognitive Screening:  Functional & Cognitive Status 3/31/2022   Do you have difficulty preparing food and eating? No   Do you have difficulty bathing yourself, getting dressed or grooming yourself? No   Do you have difficulty using the toilet? No   Do you have difficulty moving around from place to place? No   Do you have trouble with steps or getting out of a bed or a chair? No   Current Diet Well Balanced Diet   Dental Exam Up to date        Dental Exam Comment 2/15/22   Eye Exam Up to date        Eye Exam Comment 6/8/2021   Exercise (times per week) 7 times per week   Current Exercises Include Walking        Exercise Comment walks 2-4 miles daily   Do you need help using the phone?  No   Are you deaf or do you have serious difficulty hearing?  No   Do you need help with transportation? No   Do you need help shopping? No   Do you need help preparing meals?  No   Do you need help with housework?  No   Do you need help with laundry? No   Do you need help taking your medications? No   Do you need help managing money? No   Do you ever drive or ride in a car without wearing a seat belt? No   Have you felt unusual stress, anger or loneliness in the last month? No   Who do you live with? Spouse   If you need help, do you have trouble finding someone available to you? (No Data)   Have you been  bothered in the last four weeks by sexual problems? No   Do you have difficulty concentrating, remembering or making decisions? No       Age-appropriate Screening Schedule:  Refer to the list below for future screening recommendations based on patient's age, sex and/or medical conditions. Orders for these recommended tests are listed in the plan section. The patient has been provided with a written plan.    Health Maintenance   Topic Date Due   • PAP SMEAR  09/13/2020   • LIPID PANEL  03/31/2023   • DXA SCAN  10/22/2023   • TDAP/TD VACCINES (2 - Td or Tdap) 03/12/2029   • INFLUENZA VACCINE  Completed   • ZOSTER VACCINE  Completed   • MAMMOGRAM  Discontinued              Assessment/Plan   CMS Preventative Services Quick Reference  Risk Factors Identified During Encounter  Hearing Problem  Immunizations Discussed/Encouraged (specific Immunizations; COVID19  The above risks/problems have been discussed with the patient.  Follow up actions/plans if indicated are seen below in the Assessment/Plan Section.  Pertinent information has been shared with the patient in the After Visit Summary.  Discussed waiting for the pneumococcal 20 vaccine.   Diagnoses and all orders for this visit:    1. Medicare annual wellness visit, subsequent (Primary)  -     POCT urinalysis dipstick, automated    2. Psychophysiological insomnia  -     traZODone (DESYREL) 150 MG tablet; Take 1 tablet by mouth Every Night.  Dispense: 90 tablet; Refill: 1    3. WALKER (dyspnea on exertion)  -     Ambulatory Referral to [facility] [place] [facility] [place]    4. History of cancer of left breast    5. Hyperlipidemia, unspecified hyperlipidemia type  -     Comprehensive Metabolic Panel; Future  -     Lipid Panel; Future    6. Screening for endocrine, metabolic and immunity disorder  -     Comprehensive Metabolic Panel; Future  -     Lipid Panel; Future  -     TSH Rfx On Abnormal To Free T4; Future  -     CBC & Differential; Future    7. Moderate  obstructive sleep apnea      covid 19 vaccine in another month  Follow Up:   No follow-ups on file.     An After Visit Summary and PPPS were made available to the patient.        I spent 40 minutes caring for Kaitlynn on this date of service. This time includes time spent by me in the following activities:preparing for the visit, reviewing tests, obtaining and/or reviewing a separately obtained history, performing a medically appropriate examination and/or evaluation , counseling and educating the patient/family/caregiver, ordering medications, tests, or procedures, referring and communicating with other health care professionals  and documenting information in the medical record         Recent Results (from the past 168 hour(s))   POCT urinalysis dipstick, automated    Collection Time: 03/31/22  9:23 AM    Specimen: Urine   Result Value Ref Range    Color Yellow Yellow, Straw, Dark Yellow, Candice    Clarity, UA Clear Clear    Specific Gravity  1.015 1.005 - 1.030    pH, Urine 6.0 5.0 - 8.0    Leukocytes Negative Negative    Nitrite, UA Negative Negative    Protein, POC Negative Negative mg/dL    Glucose, UA Negative Negative, 1000 mg/dL (3+) mg/dL    Ketones, UA Negative Negative    Urobilinogen, UA Normal Normal    Bilirubin Negative Negative    Blood, UA Negative Negative    Lot Number [id number]     Expiration Date 10/21/2023    Comprehensive Metabolic Panel    Collection Time: 03/31/22 10:21 AM    Specimen: Blood   Result Value Ref Range    Glucose 92 65 - 99 mg/dL    BUN 14 8 - 23 mg/dL    Creatinine 0.94 0.57 - 1.00 mg/dL    Sodium 142 136 - 145 mmol/L    Potassium 4.0 3.5 - 5.2 mmol/L    Chloride 105 98 - 107 mmol/L    CO2 27.2 22.0 - 29.0 mmol/L    Calcium 9.6 8.6 - 10.5 mg/dL    Total Protein 7.1 6.0 - 8.5 g/dL    Albumin 4.50 3.50 - 5.20 g/dL    ALT (SGPT) 14 1 - 33 U/L    AST (SGOT) 19 1 - 32 U/L    Alkaline Phosphatase 76 39 - 117 U/L    Total Bilirubin 0.6 0.0 - 1.2 mg/dL    Globulin 2.6 gm/dL    A/G  Ratio 1.7 g/dL    BUN/Creatinine Ratio 14.9 7.0 - 25.0    Anion Gap 9.8 5.0 - 15.0 mmol/L    eGFR 67.1 >60.0 mL/min/1.73   Lipid Panel    Collection Time: 03/31/22 10:21 AM    Specimen: Blood   Result Value Ref Range    Total Cholesterol 260 (H) 0 - 200 mg/dL    Triglycerides 42 0 - 150 mg/dL    HDL Cholesterol 96 (H) 40 - 60 mg/dL    LDL Cholesterol  158 (H) 0 - 100 mg/dL    VLDL Cholesterol 6 5 - 40 mg/dL    LDL/HDL Ratio 1.62    TSH Rfx On Abnormal To Free T4    Collection Time: 03/31/22 10:21 AM    Specimen: Blood   Result Value Ref Range    TSH 2.190 0.270 - 4.200 uIU/mL   CBC Auto Differential    Collection Time: 03/31/22 10:21 AM    Specimen: Blood   Result Value Ref Range    WBC 4.06 3.40 - 10.80 10*3/mm3    RBC 4.14 3.77 - 5.28 10*6/mm3    Hemoglobin 13.4 12.0 - 15.9 g/dL    Hematocrit 39.6 34.0 - 46.6 %    MCV 95.7 79.0 - 97.0 fL    MCH 32.4 26.6 - 33.0 pg    MCHC 33.8 31.5 - 35.7 g/dL    RDW 12.1 (L) 12.3 - 15.4 %    RDW-SD 42.1 37.0 - 54.0 fl    MPV 11.0 6.0 - 12.0 fL    Platelets 201 140 - 450 10*3/mm3    Neutrophil % 66.7 42.7 - 76.0 %    Lymphocyte % 23.9 19.6 - 45.3 %    Monocyte % 7.9 5.0 - 12.0 %    Eosinophil % 1.0 0.3 - 6.2 %    Basophil % 0.5 0.0 - 1.5 %    Immature Grans % 0.0 0.0 - 0.5 %    Neutrophils, Absolute 2.71 1.70 - 7.00 10*3/mm3    Lymphocytes, Absolute 0.97 0.70 - 3.10 10*3/mm3    Monocytes, Absolute 0.32 0.10 - 0.90 10*3/mm3    Eosinophils, Absolute 0.04 0.00 - 0.40 10*3/mm3    Basophils, Absolute 0.02 0.00 - 0.20 10*3/mm3    Immature Grans, Absolute 0.00 0.00 - 0.05 10*3/mm3    nRBC 0.0 0.0 - 0.2 /100 WBC     Please follow a low animal fat diet that is also low in sugar, low in junk food, low in sweet drinks and low in alcohol.  Please increase the amount of fiber in your diet as well as increasing your daily exercise, such as walking.    Handouts to pt on Fall risk, advance care directives, healthy diets such as Mediterranean or Dash or calorie counting, and healthy exercising.

## 2022-03-31 NOTE — TELEPHONE ENCOUNTER
----- Message from Adela LIN MD sent at 3/31/2022  9:47 AM EDT -----  Regarding: bone density at [facility] Bone density was at [facility]

## 2022-04-01 ENCOUNTER — TELEPHONE (OUTPATIENT)
Dept: INTERNAL MEDICINE | Facility: CLINIC | Age: 67
End: 2022-04-01

## 2022-04-01 NOTE — TELEPHONE ENCOUNTER
----- Message from Adela LIN MD sent at 3/31/2022  7:20 PM EDT -----  LDL is gradually increasing.  Please follow low animal fat, low sugar, low alcohol diet.  If patient already following the diet, need to add statins.

## 2022-04-04 ENCOUNTER — TELEPHONE (OUTPATIENT)
Dept: ORTHOPEDIC SURGERY | Facility: CLINIC | Age: 67
End: 2022-04-04

## 2022-04-04 NOTE — TELEPHONE ENCOUNTER
Caller: Kaitlynn Le    Relationship to patient: Self    Best call back number: [phone]    Patient is needing: CALLBACK  PATIENT THINKS SHE MAY HAVE FRACTURED HER RT FOOT JUST BELOW THE TOES   UNSURE IF ZURI SEES THIS      UNABLE TO WARM TRANSFER

## 2022-04-04 NOTE — TELEPHONE ENCOUNTER
I spoke with the patient and advised her that Dr. Moreno is happy to see her for this in the clinic but his next available is not until 04/12 for a new problem. She mentioned that she may be able to get into her foot doctor sooner but still wanted to stay on the schedule just in case. I told her that this was fine and if she feels like she needs to cancel for other reasons, then she can call in to cancel the appointment. She understood.    RAMANDEEP Abreu

## 2022-04-11 NOTE — PROGRESS NOTES
"[facility]  [facility] Valve Center    Chief Complaint  Shortness of Breath and Establish Care    Subjective    History of Present Illness {CC  Problem List  Visit  Diagnosis   Encounters  Notes  Medications  Labs  Result Review Imaging  Media :23}     Kaitlynn Le is a 66 y.o. female with hyperlipidemia, obstructive sleep apnea (wears CPAP), breast cancer, GERD who presents today for evaluation of exertional dyspnea at the request of Dr. Valdez. She reports over the past few months she has noticed a decrease in her energy and exertional dyspnea if climbing stairs or walking up an incline. She has walked 2-4 miles daily for the past 13 years, which does not bother her but if she goes up an incline she has exertional dyspnea. No chest pain. She notes hx of GERD and chest discomfort at night if she eats tomato products. Notes hx of vertigo and had a syncopal episode secondary to dehydration from GI bug but no recurrent syncope or lightheadedness    Cardiac evaluation in ~2008 in [place] including stress test, echo. No hx of LHC    Her father, brother and sister all had mitral valve replacement. Mother had MVP but no replacement     Cardiac risks: Hyperlipidemia, age    Objective     Vital Signs:   Vitals:    04/13/22 0756 04/13/22 0759   BP: 129/61 129/61   BP Location: Right arm Right arm   Patient Position: Standing Sitting   Cuff Size: Adult Adult   Pulse: 83 77   Resp:  16   Temp: 97.8 °F (36.6 °C) 97.8 °F (36.6 °C)   TempSrc: Temporal Temporal   SpO2: 98% 97%   Weight:  64.4 kg (142 lb)   Height:  168.7 cm (66.4\")     Body mass index is 22.64 kg/m².  Physical Exam  Vitals reviewed.   Constitutional:       Appearance: Normal appearance.   HENT:      Head: Normocephalic.   Neck:      Vascular: Carotid bruit (right) present.   Cardiovascular:      Rate and Rhythm: Normal rate and regular rhythm.      Pulses: Normal pulses.      Heart sounds: S1 normal and S2 normal. Murmur heard.    " Systolic murmur is present with a grade of 2/6.  Pulmonary:      Effort: Pulmonary effort is normal. No respiratory distress.      Breath sounds: Normal breath sounds.   Chest:      Chest wall: No tenderness.   Abdominal:      General: Abdomen is flat.      Palpations: Abdomen is soft.   Musculoskeletal:      Cervical back: Neck supple.      Right lower leg: No edema.      Left lower leg: No edema.   Skin:     General: Skin is warm and dry.   Neurological:      General: No focal deficit present.      Mental Status: She is alert and oriented to person, place, and time. Mental status is at baseline.   Psychiatric:         Mood and Affect: Mood normal.         Behavior: Behavior normal.         Thought Content: Thought content normal.              Result Review  Data Reviewed:{ Labs  Result Review  Imaging  Med Tab  Media :23}   CBC & Differential (03/31/2022 10:21)  TSH Rfx On Abnormal To Free T4 (03/31/2022 10:21)  Lipid Panel (03/31/2022 10:21)  Comprehensive Metabolic Panel (03/31/2022 10:21)  POCT urinalysis dipstick, automated (03/31/2022 09:23)    Consultant notes Dr. Valdez            Assessment and Plan {CC Problem List  Visit Diagnosis  ROS  Review (Popup)  Health Maintenance  Quality  BestPractice  Medications  SmartSets  SnapShot Encounters  Media :23}   1. WALKER (dyspnea on exertion)  Possible anginal equivalent. Will rule out ischemia with MPS and structural heart abnormality with an echo  - Adult Transthoracic Echo Complete W/ Cont if Necessary Per Protocol; Future  - Stress Test With Myocardial Perfusion (1 Day); Future. Stress test instructions provided to patient.       2. Hyperlipidemia, unspecified hyperlipidemia type  The 10-year ASCVD risk score (Elham JOESPH Onofre., et al., 2013) is: 5.8%    Values used to calculate the score:      Age: 66 years      Sex: Female      Is Non- : No      Diabetic: No      Tobacco smoker: No      Systolic Blood Pressure: 129 mmHg       Is BP treated: No      HDL Cholesterol: 96 mg/dL      Total Cholesterol: 260 mg/dL  Ok to continue lifestyle interventions as long as carotid and stress test okay  - Stress Test With Myocardial Perfusion (1 Day); Future    3. Heart murmur  Strong family hx of VHD  - Adult Transthoracic Echo Complete W/ Cont if Necessary Per Protocol; Future  - Stress Test With Myocardial Perfusion (1 Day); Future    4. Bruit of right carotid artery    - Duplex Carotid Ultrasound CAR; Future  - Stress Test With Myocardial Perfusion (1 Day); Future            Follow Up {Instructions Charge Capture  Follow-up Communications :23}   Return if symptoms worsen or fail to improve.    Patient was given instructions and counseling regarding her condition or for health maintenance advice. Please see specific information pulled into the AVS if appropriate.  Advised to call the Heart and Valve Center with any questions, concerns, or worsening symptoms.

## 2022-04-13 ENCOUNTER — OFFICE VISIT (OUTPATIENT)
Dept: CARDIOLOGY | Facility: HOSPITAL | Age: 67
End: 2022-04-13

## 2022-04-13 VITALS
TEMPERATURE: 97.8 F | OXYGEN SATURATION: 97 % | RESPIRATION RATE: 16 BRPM | SYSTOLIC BLOOD PRESSURE: 129 MMHG | HEART RATE: 77 BPM | DIASTOLIC BLOOD PRESSURE: 61 MMHG | WEIGHT: 142 LBS | HEIGHT: 66 IN | BODY MASS INDEX: 22.82 KG/M2

## 2022-04-13 DIAGNOSIS — E78.5 HYPERLIPIDEMIA, UNSPECIFIED HYPERLIPIDEMIA TYPE: ICD-10-CM

## 2022-04-13 DIAGNOSIS — R09.89 BRUIT OF RIGHT CAROTID ARTERY: ICD-10-CM

## 2022-04-13 DIAGNOSIS — R01.1 HEART MURMUR: ICD-10-CM

## 2022-04-13 DIAGNOSIS — R06.09 DOE (DYSPNEA ON EXERTION): Primary | ICD-10-CM

## 2022-04-13 PROCEDURE — 99214 OFFICE O/P EST MOD 30 MIN: CPT | Performed by: NURSE PRACTITIONER

## 2022-05-09 ENCOUNTER — HOSPITAL ENCOUNTER (OUTPATIENT)
Dept: CARDIOLOGY | Facility: HOSPITAL | Age: 67
Discharge: HOME | End: 2022-05-09

## 2022-05-09 ENCOUNTER — TELEPHONE (OUTPATIENT)
Dept: CARDIOLOGY | Facility: HOSPITAL | Age: 67
End: 2022-05-09

## 2022-05-09 ENCOUNTER — HOSPITAL ENCOUNTER (OUTPATIENT)
Dept: CARDIOLOGY | Facility: HOSPITAL | Age: 67
End: 2022-05-09

## 2022-05-09 VITALS — HEIGHT: 66 IN | WEIGHT: 142 LBS | BODY MASS INDEX: 22.82 KG/M2

## 2022-05-09 VITALS — BODY MASS INDEX: 22.82 KG/M2 | HEIGHT: 66 IN | WEIGHT: 142 LBS

## 2022-05-09 DIAGNOSIS — R09.89 BRUIT OF RIGHT CAROTID ARTERY: ICD-10-CM

## 2022-05-09 DIAGNOSIS — R06.09 DOE (DYSPNEA ON EXERTION): ICD-10-CM

## 2022-05-09 DIAGNOSIS — R01.1 HEART MURMUR: ICD-10-CM

## 2022-05-09 DIAGNOSIS — I38 VHD (VALVULAR HEART DISEASE): Primary | ICD-10-CM

## 2022-05-09 DIAGNOSIS — E78.5 HYPERLIPIDEMIA, UNSPECIFIED HYPERLIPIDEMIA TYPE: ICD-10-CM

## 2022-05-09 LAB
ASCENDING AORTA: 3.3 CM
BH CV ECHO MEAS - AO MAX PG: 5.3 MMHG
BH CV ECHO MEAS - AO MEAN PG: 3.3 MMHG
BH CV ECHO MEAS - AO ROOT DIAM: 4.1 CM
BH CV ECHO MEAS - AO V2 MAX: 115.2 CM/SEC
BH CV ECHO MEAS - AO V2 VTI: 24.4 CM
BH CV ECHO MEAS - AVA(I,D): 2.8 CM2
BH CV ECHO MEAS - EDV(CUBED): 53.7 ML
BH CV ECHO MEAS - EDV(MOD-SP2): 87 ML
BH CV ECHO MEAS - EDV(MOD-SP4): 84 ML
BH CV ECHO MEAS - EF(MOD-BP): 63 %
BH CV ECHO MEAS - EF(MOD-SP2): 57.5 %
BH CV ECHO MEAS - EF(MOD-SP4): 63.1 %
BH CV ECHO MEAS - ESV(CUBED): 11.2 ML
BH CV ECHO MEAS - ESV(MOD-SP2): 37 ML
BH CV ECHO MEAS - ESV(MOD-SP4): 31 ML
BH CV ECHO MEAS - FS: 40.8 %
BH CV ECHO MEAS - IVS/LVPW: 1.09 CM
BH CV ECHO MEAS - IVSD: 1.3 CM
BH CV ECHO MEAS - LA DIMENSION: 3.3 CM
BH CV ECHO MEAS - LV MASS(C)D: 157.6 GRAMS
BH CV ECHO MEAS - LV MAX PG: 5.8 MMHG
BH CV ECHO MEAS - LV MEAN PG: 2.5 MMHG
BH CV ECHO MEAS - LV V1 MAX: 120.4 CM/SEC
BH CV ECHO MEAS - LV V1 VTI: 20.8 CM
BH CV ECHO MEAS - LVIDD: 3.8 CM
BH CV ECHO MEAS - LVIDS: 2.2 CM
BH CV ECHO MEAS - LVOT AREA: 3.2 CM2
BH CV ECHO MEAS - LVOT DIAM: 2.03 CM
BH CV ECHO MEAS - LVPWD: 1.2 CM
BH CV ECHO MEAS - MV A MAX VEL: 90.8 CM/SEC
BH CV ECHO MEAS - MV DEC SLOPE: 375.7 CM/SEC2
BH CV ECHO MEAS - MV DEC TIME: 0.13 MSEC
BH CV ECHO MEAS - MV E MAX VEL: 53.8 CM/SEC
BH CV ECHO MEAS - MV E/A: 0.59
BH CV ECHO MEAS - MV MAX PG: 8.7 MMHG
BH CV ECHO MEAS - MV MEAN PG: 4.1 MMHG
BH CV ECHO MEAS - MV P1/2T: 72.4 MSEC
BH CV ECHO MEAS - MV V2 VTI: 30.4 CM
BH CV ECHO MEAS - MVA(P1/2T): 3 CM2
BH CV ECHO MEAS - MVA(VTI): 2.21 CM2
BH CV ECHO MEAS - PA ACC SLOPE: 531.1 CM/SEC2
BH CV ECHO MEAS - PA ACC TIME: 0.17 SEC
BH CV ECHO MEAS - PA PR(ACCEL): 4.1 MMHG
BH CV ECHO MEAS - PA V2 MAX: 86.4 CM/SEC
BH CV ECHO MEAS - RAP SYSTOLE: 3 MMHG
BH CV ECHO MEAS - RVSP: 7 MMHG
BH CV ECHO MEAS - SV(LVOT): 67.4 ML
BH CV ECHO MEAS - SV(MOD-SP2): 50 ML
BH CV ECHO MEAS - SV(MOD-SP4): 53 ML
BH CV ECHO MEAS - TAPSE (>1.6): 2.5 CM
BH CV ECHO MEAS - TR MAX PG: 4.1 MMHG
BH CV ECHO MEAS - TR MAX VEL: 101.6 CM/SEC
BH CV NUCLEAR PRIOR STUDY: 3
BH CV REST NUCLEAR ISOTOPE DOSE: 9.5 MCI
BH CV STRESS BP STAGE 2: NORMAL
BH CV STRESS DURATION MIN STAGE 1: 3
BH CV STRESS DURATION MIN STAGE 2: 3
BH CV STRESS DURATION SEC STAGE 1: 0
BH CV STRESS DURATION SEC STAGE 2: 27
BH CV STRESS GRADE STAGE 1: 10
BH CV STRESS GRADE STAGE 2: 12
BH CV STRESS HR STAGE 1: 137
BH CV STRESS HR STAGE 2: 144
BH CV STRESS METS STAGE 1: 5
BH CV STRESS METS STAGE 2: 7.5
BH CV STRESS NUCLEAR ISOTOPE DOSE: 33 MCI
BH CV STRESS O2 STAGE 1: 100
BH CV STRESS O2 STAGE 2: 98
BH CV STRESS PROTOCOL 1: NORMAL
BH CV STRESS RECOVERY BP: NORMAL MMHG
BH CV STRESS RECOVERY HR: 93 BPM
BH CV STRESS RECOVERY O2: 97 %
BH CV STRESS SPEED STAGE 1: 1.7
BH CV STRESS SPEED STAGE 2: 2.5
BH CV STRESS STAGE 1: 1
BH CV STRESS STAGE 2: 2
BH CV XLRA - RV BASE: 2.5 CM
BH CV XLRA - RV LENGTH: 6.6 CM
BH CV XLRA - RV MID: 1.9 CM
BH CV XLRA MEAS LEFT DIST CCA EDV: 24.4 CM/SEC
BH CV XLRA MEAS LEFT DIST CCA PSV: 85.2 CM/SEC
BH CV XLRA MEAS LEFT DIST ICA EDV: 25.1 CM/SEC
BH CV XLRA MEAS LEFT DIST ICA PSV: 83.1 CM/SEC
BH CV XLRA MEAS LEFT ICA/CCA RATIO: -1.28
BH CV XLRA MEAS LEFT MID CCA EDV: 30.7 CM/SEC
BH CV XLRA MEAS LEFT MID CCA PSV: 94.3 CM/SEC
BH CV XLRA MEAS LEFT MID ICA EDV: 37 CM/SEC
BH CV XLRA MEAS LEFT MID ICA PSV: 108 CM/SEC
BH CV XLRA MEAS LEFT PROX CCA EDV: 33.4 CM/SEC
BH CV XLRA MEAS LEFT PROX CCA PSV: 114.9 CM/SEC
BH CV XLRA MEAS LEFT PROX ECA PSV: 90.1 CM/SEC
BH CV XLRA MEAS LEFT PROX ICA EDV: 28.6 CM/SEC
BH CV XLRA MEAS LEFT PROX ICA PSV: 95.7 CM/SEC
BH CV XLRA MEAS LEFT PROX SCLA PSV: 148.3 CM/SEC
BH CV XLRA MEAS LEFT VERTEBRAL A EDV: 10.5 CM/SEC
BH CV XLRA MEAS LEFT VERTEBRAL A PSV: 44.7 CM/SEC
BH CV XLRA MEAS RIGHT DIST CCA EDV: 26.5 CM/SEC
BH CV XLRA MEAS RIGHT DIST CCA PSV: 100 CM/SEC
BH CV XLRA MEAS RIGHT DIST ICA EDV: 40.3 CM/SEC
BH CV XLRA MEAS RIGHT DIST ICA PSV: 130 CM/SEC
BH CV XLRA MEAS RIGHT ICA/CCA RATIO: 1.06
BH CV XLRA MEAS RIGHT MID CCA EDV: 22.6 CM/SEC
BH CV XLRA MEAS RIGHT MID CCA PSV: 107 CM/SEC
BH CV XLRA MEAS RIGHT MID ICA EDV: 35.4 CM/SEC
BH CV XLRA MEAS RIGHT MID ICA PSV: 106 CM/SEC
BH CV XLRA MEAS RIGHT PROX CCA EDV: 31.4 CM/SEC
BH CV XLRA MEAS RIGHT PROX CCA PSV: 129.6 CM/SEC
BH CV XLRA MEAS RIGHT PROX ECA PSV: 106 CM/SEC
BH CV XLRA MEAS RIGHT PROX ICA EDV: 29.5 CM/SEC
BH CV XLRA MEAS RIGHT PROX ICA PSV: 102 CM/SEC
BH CV XLRA MEAS RIGHT PROX SCLA PSV: 144.4 CM/SEC
BH CV XLRA MEAS RIGHT VERTEBRAL A EDV: 14 CM/SEC
BH CV XLRA MEAS RIGHT VERTEBRAL A PSV: 55 CM/SEC
LEFT ATRIUM VOLUME INDEX: 22 ML/M2
LV EF 2D ECHO EST: 72 %
LV EF NUC BP: 80 %
MAXIMAL PREDICTED HEART RATE: 154 BPM
PERCENT MAX PREDICTED HR: 103.9 %
RIGHT ARM BP: NORMAL MMHG
STRESS BASELINE BP: NORMAL MMHG
STRESS BASELINE HR: 85 BPM
STRESS O2 SAT REST: 100 %
STRESS PERCENT HR: 122 %
STRESS POST ESTIMATED WORKLOAD: 7.6 METS
STRESS POST EXERCISE DUR MIN: 6 MIN
STRESS POST EXERCISE DUR SEC: 27 SEC
STRESS POST O2 SAT PEAK: 98 %
STRESS POST PEAK BP: NORMAL MMHG
STRESS POST PEAK HR: 160 BPM
STRESS TARGET HR: 131 BPM

## 2022-05-09 PROCEDURE — 78452 HT MUSCLE IMAGE SPECT MULT: CPT | Performed by: INTERNAL MEDICINE

## 2022-05-09 PROCEDURE — 93306 TTE W/DOPPLER COMPLETE: CPT | Performed by: INTERNAL MEDICINE

## 2022-05-09 PROCEDURE — 93880 EXTRACRANIAL BILAT STUDY: CPT

## 2022-05-09 PROCEDURE — 78452 HT MUSCLE IMAGE SPECT MULT: CPT

## 2022-05-09 PROCEDURE — 93017 CV STRESS TEST TRACING ONLY: CPT

## 2022-05-09 PROCEDURE — 93018 CV STRESS TEST I&R ONLY: CPT | Performed by: INTERNAL MEDICINE

## 2022-05-09 PROCEDURE — 93306 TTE W/DOPPLER COMPLETE: CPT

## 2022-05-09 PROCEDURE — 93880 EXTRACRANIAL BILAT STUDY: CPT | Performed by: INTERNAL MEDICINE

## 2022-05-09 PROCEDURE — A9500 TC99M SESTAMIBI: HCPCS | Performed by: NURSE PRACTITIONER

## 2022-05-09 PROCEDURE — 0 TECHNETIUM SESTAMIBI: Performed by: NURSE PRACTITIONER

## 2022-05-09 RX ADMIN — TECHNETIUM TC 99M SESTAMIBI 1 DOSE: 1 INJECTION INTRAVENOUS at 09:25

## 2022-05-09 RX ADMIN — TECHNETIUM TC 99M SESTAMIBI 1 DOSE: 1 INJECTION INTRAVENOUS at 07:45

## 2022-05-09 NOTE — TELEPHONE ENCOUNTER
Called patient with stress test and echo results.  Stress test was negative for ischemia,MPI showed normal perfusion with no significant calcifications.  Echo did show evidence of mitral valve prolapse and moderate aortic valve sclerosis with mild dilation of the aortic root.  She has a strong family history of valvular heart disease.  Will refer to cardiology for routine surveillance.  She had no further questions or concerns

## 2022-05-10 ENCOUNTER — OFFICE VISIT (OUTPATIENT)
Dept: GASTROENTEROLOGY | Facility: CLINIC | Age: 67
End: 2022-05-10

## 2022-05-10 VITALS
TEMPERATURE: 97.8 F | DIASTOLIC BLOOD PRESSURE: 66 MMHG | SYSTOLIC BLOOD PRESSURE: 145 MMHG | WEIGHT: 135.6 LBS | HEART RATE: 76 BPM | BODY MASS INDEX: 21.89 KG/M2

## 2022-05-10 DIAGNOSIS — R10.9 TRIGGER POINT OF ABDOMEN: Primary | ICD-10-CM

## 2022-05-10 DIAGNOSIS — K21.00 GASTROESOPHAGEAL REFLUX DISEASE WITH ESOPHAGITIS, UNSPECIFIED WHETHER HEMORRHAGE: ICD-10-CM

## 2022-05-10 DIAGNOSIS — Z01.812 ENCOUNTER FOR PREPROCEDURE SCREENING LABORATORY TESTING FOR COVID-19: Primary | ICD-10-CM

## 2022-05-10 DIAGNOSIS — R13.19 ESOPHAGEAL DYSPHAGIA: ICD-10-CM

## 2022-05-10 DIAGNOSIS — Z20.822 ENCOUNTER FOR PREPROCEDURE SCREENING LABORATORY TESTING FOR COVID-19: Primary | ICD-10-CM

## 2022-05-10 PROCEDURE — 99214 OFFICE O/P EST MOD 30 MIN: CPT | Performed by: INTERNAL MEDICINE

## 2022-05-10 RX ORDER — AMITRIPTYLINE HYDROCHLORIDE 25 MG/1
50 TABLET, FILM COATED ORAL NIGHTLY
Qty: 60 TABLET | Refills: 6 | Status: SHIPPED | OUTPATIENT
Start: 2022-05-10 | End: 2022-05-26

## 2022-05-10 NOTE — PROGRESS NOTES
GASTROENTEROLOGY OFFICE NOTE  Kaitlynn Le  [id number]  1955      Chief Complaint   Patient presents with   • Difficulty Swallowing   • Abdominal Pain        HISTORY OF PRESENT ILLNESS:  66-year-old white female presents primarily to address issues with abdominal pain.  This is in the epigastric pain.  Is been present for months.  At least 2 months.  It is constant and it never goes away.  It is worse when she lays flat.  It is persistent.  It is mild.  Sometimes is worse with food.  She has some mild dysphagia to solids.  She does have to chew carefully and eat slowly to avoid the sensation of food getting stuck or hung up in her esophagus.  She is lost about 5 pounds in last few weeks because she is been limiting her oral intake.    She denies odynophagia, melena, bright red blood per rectum, constipation or diarrhea.    Her last colonoscopy was last May (2021) it was entirely normal and a follow-up colonoscopy is recommended in 7 to 10 years given her prior history of adenomatous polyps.      PAST MEDICAL HISTORY  Past Medical History:   Diagnosis Date   • Cancer (HCC)     breast   • Elevated cholesterol    • GERD (gastroesophageal reflux disease) 3/12/2019   • History of radiation therapy    • Hyperlipidemia    • Kidney stones 04/13/2017    bilateral   • Osteoarthritis of knee    • Osteopenia of multiple sites 9/19/2018   • Osteoporosis    • Pes planus, flexible    • Sleep apnea    • Tibialis posterior tendinitis    • Urinary tract infection July 2017   • Varicose veins of legs    • Vertigo         PAST SURGICAL HISTORY  Past Surgical History:   Procedure Laterality Date   • BREAST RECONSTRUCTION Bilateral 2014   • COLONOSCOPY  2/412575   • COLONOSCOPY  05/04/2021    Dr Manning   • CYSTOSCOPY  04/13/2017    right ureteral stone Dr Lofton   • CYSTOSCOPY  12/07/2009   • CYSTOSCOPY INSERTION / REMOVAL STENT / STONE  05/01/2009    calcium oxxylate mono   • EXTRACORPOREAL SHOCK WAVE LITHOTRIPSY (ESWL)  Left 03/2009   • EXTRACORPOREAL SHOCK WAVE LITHOTRIPSY (ESWL) Bilateral 3/19/2021    Procedure: EXTRACORPOREAL SHOCKWAVE LITHOTRIPSY BILATERAL;  Surgeon: Jayy Lofton Jr., MD;  Location: [facility];  Service: Urology;  Laterality: Bilateral;   • HERNIA REPAIR     • KNEE ARTHROSCOPY W/ MENISCAL REPAIR Right 2011   • MASTECTOMY MODIFIED RADICAL / SIMPLE / COMPLETE Left 2013    breast cancer   • SIMPLE MASTECTOMY Right 2013    hx breast Ca left   • SKIN BIOPSY  07/2017    mid thoracic back Dr Salinas   • TONSILLECTOMY  09/2015   • WISDOM TOOTH EXTRACTION  2010        MEDICATIONS:    Current Outpatient Medications:   •  Calcium Citrate-Vitamin D (CALCIUM CITRATE + D PO), Take 1 tablet by mouth 2 (Two) Times a Day., Disp: , Rfl:   •  Clindamycin Phos-Benzoyl Perox 1.2-5 % gel, As Needed., Disp: , Rfl:   •  exemestane (AROMASIN) 25 MG chemo tablet, 1 tablet Daily., Disp: , Rfl:   •  hydrocortisone 2.5 % cream, As Needed., Disp: , Rfl:   •  metroNIDAZOLE (METROCREAM) 0.75 % cream, Apply 1 application topically to the appropriate area as directed As Needed., Disp: , Rfl:   •  nystatin (MYCOSTATIN) 632200 UNIT/GM cream, As Needed., Disp: , Rfl:   •  Probiotic Product (Align) capsule, Daily, Disp: , Rfl:   •  traZODone (DESYREL) 150 MG tablet, Take 1 tablet by mouth Every Night., Disp: 90 tablet, Rfl: 1  •  triamcinolone (KENALOG) 0.1 % lotion, Apply  topically to the appropriate area as directed Daily As Needed., Disp: , Rfl:   •  vitamin B-12 (CYANOCOBALAMIN) 500 MCG tablet, Take 500 mcg by mouth 3 (Three) Times a Week., Disp: , Rfl:     ALLERGIES  Allergies   Allergen Reactions   • Caffeine Other (See Comments)     Headaches, speeds up heart rate, gets acne with chocolate, keeps her from sleeping   • Ibuprofen GI Intolerance   • Adhesive Tape Rash     Paper tape       FAMILY HISTORY:  Family History   Problem Relation Age of Onset   • Breast cancer Mother    • Mitral valve prolapse Mother    • Heart failure Mother     • Heart disease Mother         Congestive heart failure   • Cancer Mother         Breast cancer   • Mitral valve prolapse Father    • Heart disease Father         Mitral valve replacement  1984   • Cancer Father         Prostate cancer   • Valvular heart disease Father         mitral valve replaced   • Migraines Sister    • Mitral valve prolapse Sister    • Valvular heart disease Sister         mitral valve replaced   • Mitral valve prolapse Brother    • Cancer Brother         Prostate cancer   • Heart disease Brother         Mitral valve replacement  2005   • Valvular heart disease Brother         mitral valve replaced   • Cancer Maternal Grandmother         liver   • Heart attack Maternal Grandfather    • No Known Problems Paternal Grandmother    • No Known Problems Paternal Grandfather    • Cancer Other    • Colon cancer Neg Hx    • Colon polyps Neg Hx        SOCIAL HISTORY  Social History     Socioeconomic History   • Marital status:    Tobacco Use   • Smoking status: Never Smoker   • Smokeless tobacco: Never Used   Vaping Use   • Vaping Use: Never used   Substance and Sexual Activity   • Alcohol use: No   • Drug use: No   • Sexual activity: Yes     Partners: Male     Birth control/protection: Post-menopausal     Comment:      Socioeconomic History:  She is a retired teacher.  She has 3 children and 3 grandchildren.           PHYSICAL EXAM   /66 (BP Location: Left arm, Patient Position: Sitting, Cuff Size: Adult)   Pulse 76   Temp 97.8 °F (36.6 °C) (Temporal)   Wt 61.5 kg (135 lb 9.6 oz)   BMI 21.89 kg/m²   General: Alert and oriented x 3. In no apparent or acute distress.  and No stigmata of chronic liver disease  HEENT: Anicteric sclerae. Normal oropharynx  Neck: Supple. Without lymphadenopathy  CV: Regular rate and rhythm, S1, S2  Lungs: Clear to ausculation. Without rales, rhonchi and wheezing  Abdomen: Very obvious trigger point in the epigastric area where she complains of pain.   Minimal pressure at this point reproduces her complaint of abdominal pain.  Extremeties: without clubbing, cyanosis or edema  Neurologic:  Alert and oriented x 3 without focal motor or sensory deficits  Rectal exam: deferred          ASSESSMENT  1.-  Myofascial abdominal pain  2.-  Intermittent dysphagia solids EGD recommended for further evaluation.  Rule out stricture, neoplasia  3.-  Remote history of colonic polyps    PLAN  1.-  I like her to try Elavil 25 to 50 mg p.o. nightly but she will need to wean off trazodone first.  She states the trazodone has not really been helpful anyway since it was prescribed for helping with insomnia.  2.-  Schedule EGD for esophageal dilation  3.-   further recommendation pending her clinical progress and response to above-noted recommendations    Sebastian Lee MD  5/10/2022   13:40 EDT

## 2022-05-26 ENCOUNTER — OFFICE VISIT (OUTPATIENT)
Dept: INTERNAL MEDICINE | Facility: CLINIC | Age: 67
End: 2022-05-26

## 2022-05-26 VITALS
SYSTOLIC BLOOD PRESSURE: 118 MMHG | DIASTOLIC BLOOD PRESSURE: 72 MMHG | HEART RATE: 97 BPM | OXYGEN SATURATION: 98 % | WEIGHT: 135 LBS | TEMPERATURE: 97.1 F | HEIGHT: 66 IN | RESPIRATION RATE: 20 BRPM | BODY MASS INDEX: 21.69 KG/M2

## 2022-05-26 DIAGNOSIS — F51.04 PSYCHOPHYSIOLOGICAL INSOMNIA: ICD-10-CM

## 2022-05-26 DIAGNOSIS — M62.838 MUSCLE SPASM: ICD-10-CM

## 2022-05-26 DIAGNOSIS — R51.9 NONINTRACTABLE EPISODIC HEADACHE, UNSPECIFIED HEADACHE TYPE: Primary | ICD-10-CM

## 2022-05-26 PROCEDURE — 99214 OFFICE O/P EST MOD 30 MIN: CPT | Performed by: PHYSICIAN ASSISTANT

## 2022-05-26 RX ORDER — TIZANIDINE 2 MG/1
1-2 TABLET ORAL NIGHTLY PRN
Qty: 30 TABLET | Refills: 0 | Status: SHIPPED | OUTPATIENT
Start: 2022-05-26 | End: 2022-07-07

## 2022-05-26 NOTE — ASSESSMENT & PLAN NOTE
Chronic, worsening. Suggested to try melatonin and can add back trazodone at half the dose to try to decrease medication use. F/u if improved sleep does not help the headaches.

## 2022-05-26 NOTE — ASSESSMENT & PLAN NOTE
Will improve sleep and treat muscle spasm of neck. F/u if not having improvement in headaches over next few weeks.   no

## 2022-05-26 NOTE — PROGRESS NOTES
Chief Complaint  Headache    Subjective          History of Present Illness  Kaitlynn Le presents to [facility] for   Headaches:  She has had h/a for the last 2-3 weeks that she attributes to not getting good sleep. She was on trazodone 150 nightly then was rxed elavil for a stomach problem and while it helped her stomach problem it caused her to have fogginess and make her head feel weird. She stopped the elavil and has not restarted trazodone b/c her sleep apnea dr suggested she try to sleep without a sleep aid. She is not getting good sleep. She also attributes her poor sleep to having to use a borrowed CPAP machine since hers was recalled and the settings are not able to be made the same. She will get a new cpap at the end of the year from insurance she hopes.   She is not sure about restarting trazodone.   No hx of migraine. The h/a starts at her neck and goes up her head and causes heaviness in her forehead. She has neck pain and tightness and goes to massage for this. She sleeps with a positional pillow on her back to help with vertigo and this causes stiff neck. She has had the h/a for the last 2-3 weeks since she stopped elavil. No n/v, no vision changes, no paresthesias.       Review of Systems   Constitutional: Negative for fever and unexpected weight loss.   Respiratory: Negative for cough, shortness of breath and wheezing.    Cardiovascular: Negative for chest pain and palpitations.   Neurological: Positive for headache. Negative for dizziness, seizures, syncope, weakness, numbness and confusion.   Psychiatric/Behavioral: Positive for sleep disturbance.       The following portions of the patient's history were reviewed and updated as appropriate: allergies, current medications, past family history, past medical history, past social history, past surgical history and problem list.  Allergies   Allergen Reactions   • Caffeine Other (See Comments)     Headaches, speeds  "up heart rate, gets acne with chocolate, keeps her from sleeping   • Ibuprofen GI Intolerance   • Adhesive Tape Rash     Paper tape     Current Outpatient Medications on File Prior to Visit   Medication Sig Dispense Refill   • Calcium Citrate-Vitamin D (CALCIUM CITRATE + D PO) Take 1 tablet by mouth 2 (Two) Times a Day.     • exemestane (AROMASIN) 25 MG chemo tablet 1 tablet Daily.     • metroNIDAZOLE (METROCREAM) 0.75 % cream Apply 1 application topically to the appropriate area as directed As Needed.     • Probiotic Product (Align) capsule Daily     • vitamin B-12 (CYANOCOBALAMIN) 500 MCG tablet Take 500 mcg by mouth 3 (Three) Times a Week.     • hydrocortisone 2.5 % cream As Needed.     • nystatin (MYCOSTATIN) 490584 UNIT/GM cream As Needed.     • triamcinolone (KENALOG) 0.1 % lotion Apply  topically to the appropriate area as directed Daily As Needed.     • [DISCONTINUED] amitriptyline (ELAVIL) 25 MG tablet Take 2 tablets by mouth Every Night. 60 tablet 6     No current facility-administered medications on file prior to visit.     New Medications Ordered This Visit   Medications   • tiZANidine (ZANAFLEX) 2 MG tablet     Sig: Take 0.5-1 tablets by mouth At Night As Needed for Muscle Spasms.     Dispense:  30 tablet     Refill:  0     Social History     Tobacco Use   Smoking Status Never Smoker   Smokeless Tobacco Never Used        Objective   Vital Signs:   Vitals:    05/26/22 1304   BP: 118/72   Pulse: 97   Resp: 20   Temp: 97.1 °F (36.2 °C)   TempSrc: Temporal   SpO2: 98%   Weight: 61.2 kg (135 lb)   Height: 167.6 cm (66\")   PainSc: 0-No pain   PainLoc: Head      Physical Exam  Vitals reviewed.   Constitutional:       General: She is not in acute distress.     Appearance: Normal appearance.   HENT:      Head: Normocephalic and atraumatic.   Eyes:      General: No scleral icterus.     Extraocular Movements: Extraocular movements intact.      Conjunctiva/sclera: Conjunctivae normal.   Cardiovascular:      Rate " and Rhythm: Normal rate and regular rhythm.      Heart sounds: Normal heart sounds. No murmur heard.  Pulmonary:      Effort: Pulmonary effort is normal. No respiratory distress.      Breath sounds: Normal breath sounds. No stridor. No wheezing or rhonchi.   Musculoskeletal:      Cervical back: Normal range of motion and neck supple.   Skin:     General: Skin is warm and dry.      Coloration: Skin is not jaundiced.   Neurological:      General: No focal deficit present.      Mental Status: She is alert and oriented to person, place, and time.      Gait: Gait normal.   Psychiatric:         Mood and Affect: Mood normal.         Behavior: Behavior normal.        No LMP recorded. Patient is postmenopausal.    Result Review :                   Assessment and Plan    Diagnoses and all orders for this visit:    1. Nonintractable episodic headache, unspecified headache type (Primary)  Assessment & Plan:  Will improve sleep and treat muscle spasm of neck. F/u if not having improvement in headaches over next few weeks.      2. Muscle spasm  Assessment & Plan:  Tizanidine prn for muscle tightness, this may help the h/a. F/u if sx not improving.     Orders:  -     tiZANidine (ZANAFLEX) 2 MG tablet; Take 0.5-1 tablets by mouth At Night As Needed for Muscle Spasms.  Dispense: 30 tablet; Refill: 0    3. Psychophysiological insomnia  Assessment & Plan:  Chronic, worsening. Suggested to try melatonin and can add back trazodone at half the dose to try to decrease medication use. F/u if improved sleep does not help the headaches.         Follow Up   Return if symptoms worsen or fail to improve, for Next scheduled follow up.    Follow up if symptoms worsen or persist or has new or concerning symptoms, go to ER for severe symptoms.   Reviewed common medication effects and side effects and advised to report side effects immediately.  Encouraged medication compliance and the importance of keeping scheduled follow up appointments with me  and any other providers.  If a referral was made please contact our office if you have not heard about an appointment in the next 2 weeks.   If labs or images are ordered we will contact you with the results within the next week.  If you have not heard from us after a week please call our office to inquire about the results.   Patient was given instructions and counseling regarding her condition or for health maintenance advice. Please see specific information pulled into the AVS if appropriate.     Bisi Kenyon PA-C    * Please note that portions of this note were completed with a voice recognition program.

## 2022-06-08 ENCOUNTER — TELEPHONE (OUTPATIENT)
Dept: INTERNAL MEDICINE | Facility: CLINIC | Age: 67
End: 2022-06-08

## 2022-06-09 ENCOUNTER — OFFICE VISIT (OUTPATIENT)
Dept: INTERNAL MEDICINE | Facility: CLINIC | Age: 67
End: 2022-06-09

## 2022-06-09 VITALS
DIASTOLIC BLOOD PRESSURE: 60 MMHG | BODY MASS INDEX: 22.5 KG/M2 | WEIGHT: 140 LBS | HEART RATE: 66 BPM | TEMPERATURE: 97.3 F | SYSTOLIC BLOOD PRESSURE: 122 MMHG | HEIGHT: 66 IN | OXYGEN SATURATION: 96 %

## 2022-06-09 DIAGNOSIS — G44.52 NPDH (NEW PERSISTENT DAILY HEADACHE): Primary | ICD-10-CM

## 2022-06-09 PROBLEM — Z23 NEED FOR VACCINATION FOR STREP PNEUMONIAE: Status: ACTIVE | Noted: 2022-06-09

## 2022-06-09 PROBLEM — Z23 NEED FOR VACCINATION FOR STREP PNEUMONIAE: Status: RESOLVED | Noted: 2022-06-09 | Resolved: 2022-06-09

## 2022-06-09 PROCEDURE — 99213 OFFICE O/P EST LOW 20 MIN: CPT | Performed by: FAMILY MEDICINE

## 2022-06-09 NOTE — PROGRESS NOTES
"Subjective   Kaitlynn Le is a 66 y.o. female.     Chief Complaint   Patient presents with   • head pressure     Pressure in front of head         Visit Vitals  /60   Pulse 66   Temp 97.3 °F (36.3 °C)   Ht 167.6 cm (66\")   Wt 63.5 kg (140 lb)   SpO2 96%   BMI 22.60 kg/m²         Headache  Headache pattern:  Headache sometimes there, sometimes not at all  Initial event:  None  Recent changes:  Headaches come more often than they used to  Frequency:  Daily  Providers seen:  None  Longest time without a headache:  Hours  Number of ER visits for headache:  0  Number of hospitalizations for headaches:  0  ADL impact frequency:  Never  Time of day symptoms are worse:  No specific time of day  Do headaches wake patient from sleep?: No    Days of the week symptoms are worse:  No specific day of the week  Season symptoms are worse:  No particular season  Quality:  Pressure pushing out and tightness  Location:  Front/forehead  Pain severity:  8  Escalation timing:  Pain level doesn't change  Aggravating factors: laptop and screens.     Pt having daily headaches frontal area. Pt denies trauma.  Pt has been having headache for several months.     The following portions of the patient's history were reviewed and updated as appropriate: allergies, current medications, past family history, past medical history, past social history, past surgical history and problem list.    Past Medical History:   Diagnosis Date   • Cancer (HCC)     breast   • Elevated cholesterol    • GERD (gastroesophageal reflux disease) 3/12/2019   • History of radiation therapy    • Hyperlipidemia    • Kidney stones 04/13/2017    bilateral   • Osteoarthritis of knee    • Osteopenia of multiple sites 9/19/2018   • Osteoporosis    • Pes planus, flexible    • Sleep apnea    • Tibialis posterior tendinitis    • Urinary tract infection July 2017   • Varicose veins of legs    • Vertigo       Past Surgical History:   Procedure Laterality Date   • BREAST " RECONSTRUCTION Bilateral 2014   • COLONOSCOPY  2/351014   • COLONOSCOPY  05/04/2021    Dr Manning   • CYSTOSCOPY  04/13/2017    right ureteral stone Dr Lofton   • CYSTOSCOPY  12/07/2009   • CYSTOSCOPY INSERTION / REMOVAL STENT / STONE  05/01/2009    calcium oxxylate mono   • EXTRACORPOREAL SHOCK WAVE LITHOTRIPSY (ESWL) Left 03/2009   • EXTRACORPOREAL SHOCK WAVE LITHOTRIPSY (ESWL) Bilateral 3/19/2021    Procedure: EXTRACORPOREAL SHOCKWAVE LITHOTRIPSY BILATERAL;  Surgeon: Jayy Lofton Jr., MD;  Location: [facility];  Service: Urology;  Laterality: Bilateral;   • HERNIA REPAIR     • KNEE ARTHROSCOPY W/ MENISCAL REPAIR Right 2011   • MASTECTOMY MODIFIED RADICAL / SIMPLE / COMPLETE Left 2013    breast cancer   • SIMPLE MASTECTOMY Right 2013    hx breast Ca left   • SKIN BIOPSY  07/2017    mid thoracic back Dr Salinas   • TONSILLECTOMY  09/2015   • WISDOM TOOTH EXTRACTION  2010      Family History   Problem Relation Age of Onset   • Breast cancer Mother    • Mitral valve prolapse Mother    • Heart failure Mother    • Heart disease Mother         Congestive heart failure   • Cancer Mother         Breast cancer   • Mitral valve prolapse Father    • Heart disease Father         Mitral valve replacement  1984   • Cancer Father         Prostate cancer   • Valvular heart disease Father         mitral valve replaced   • Migraines Sister    • Mitral valve prolapse Sister    • Valvular heart disease Sister         mitral valve replaced   • Mitral valve prolapse Brother    • Cancer Brother         Prostate cancer   • Heart disease Brother         Mitral valve replacement  2005   • Valvular heart disease Brother         mitral valve replaced   • Cancer Maternal Grandmother         liver   • Heart attack Maternal Grandfather    • No Known Problems Paternal Grandmother    • No Known Problems Paternal Grandfather    • Cancer Other    • Colon cancer Neg Hx    • Colon polyps Neg Hx       Social History     Socioeconomic  History   • Marital status:    Tobacco Use   • Smoking status: Never Smoker   • Smokeless tobacco: Never Used   Vaping Use   • Vaping Use: Never used   Substance and Sexual Activity   • Alcohol use: No   • Drug use: No   • Sexual activity: Yes     Partners: Male     Birth control/protection: Post-menopausal     Comment:       Allergies   Allergen Reactions   • Caffeine Other (See Comments)     Headaches, speeds up heart rate, gets acne with chocolate, keeps her from sleeping   • Ibuprofen GI Intolerance   • Adhesive Tape Rash     Paper tape       Review of Systems   HENT: Negative for sinus pain.    Neurological: Positive for headaches.       Objective   Physical Exam  Vitals and nursing note reviewed.   Constitutional:       Appearance: She is well-developed.   HENT:      Head: Normocephalic.      Right Ear: Tympanic membrane, ear canal and external ear normal. Decreased hearing noted.      Left Ear: Tympanic membrane, ear canal and external ear normal. Decreased hearing noted.      Ears:        Nose: Nose normal.      Right Sinus: No maxillary sinus tenderness or frontal sinus tenderness.      Left Sinus: No maxillary sinus tenderness or frontal sinus tenderness.      Mouth/Throat:      Lips: Pink.      Mouth: Mucous membranes are moist. No injury.      Dentition: Normal dentition.      Tongue: No lesions.      Palate: No mass.      Pharynx: Oropharynx is clear. No pharyngeal swelling, oropharyngeal exudate, posterior oropharyngeal erythema or uvula swelling.   Eyes:      General: Lids are normal.      Extraocular Movements: Extraocular movements intact.      Right eye: Normal extraocular motion and no nystagmus.      Left eye: Normal extraocular motion and no nystagmus.      Conjunctiva/sclera: Conjunctivae normal.      Right eye: Right conjunctiva is not injected. No chemosis, exudate or hemorrhage.     Left eye: Left conjunctiva is not injected. No chemosis, exudate or hemorrhage.     Pupils:  Pupils are equal, round, and reactive to light.   Neck:      Thyroid: No thyroid mass or thyromegaly.      Vascular: No carotid bruit.      Trachea: Trachea normal.   Cardiovascular:      Rate and Rhythm: Normal rate and regular rhythm.      Heart sounds: No murmur heard.  Pulmonary:      Effort: Pulmonary effort is normal. No respiratory distress.      Breath sounds: Normal breath sounds. No decreased breath sounds, wheezing, rhonchi or rales.   Chest:      Chest wall: No tenderness.   Abdominal:      General: Bowel sounds are normal.      Palpations: Abdomen is soft.      Tenderness: There is no abdominal tenderness.   Musculoskeletal:         General: Normal range of motion.      Cervical back: Normal range of motion and neck supple.   Skin:     General: Skin is warm and dry.   Neurological:      Mental Status: She is alert and oriented to person, place, and time.      Cranial Nerves: Cranial nerves are intact. No cranial nerve deficit, dysarthria or facial asymmetry.      Gait: Gait normal.      Deep Tendon Reflexes: Reflexes normal.      Reflex Scores:       Bicep reflexes are 2+ on the right side and 2+ on the left side.       Brachioradialis reflexes are 2+ on the right side and 2+ on the left side.       Patellar reflexes are 2+ on the right side and 2+ on the left side.  Psychiatric:         Behavior: Behavior normal.         Assessment & Plan   Diagnoses and all orders for this visit:    1. NPDH (new persistent daily headache) (Primary)  -     MRI Brain Without Contrast; Future    Other orders  -     Cancel: Pneumococcal Conjugate Vaccine 20-Valent (PCV20)      To ER if any weakness or facial drooping  Too late give Covid vaccine, pt will come back for her covid and pneumonia vaccine           Current Outpatient Medications:   •  Calcium Citrate-Vitamin D (CALCIUM CITRATE + D PO), Take 1 tablet by mouth 2 (Two) Times a Day., Disp: , Rfl:   •  exemestane (AROMASIN) 25 MG chemo tablet, 1 tablet Daily., Disp:  , Rfl:   •  hydrocortisone 2.5 % cream, As Needed., Disp: , Rfl:   •  metroNIDAZOLE (METROCREAM) 0.75 % cream, Apply 1 application topically to the appropriate area as directed As Needed., Disp: , Rfl:   •  nystatin (MYCOSTATIN) 009829 UNIT/GM cream, As Needed., Disp: , Rfl:   •  Probiotic Product (Align) capsule, Daily, Disp: , Rfl:   •  tiZANidine (ZANAFLEX) 2 MG tablet, Take 0.5-1 tablets by mouth At Night As Needed for Muscle Spasms., Disp: 30 tablet, Rfl: 0  •  vitamin B-12 (CYANOCOBALAMIN) 500 MCG tablet, Take 500 mcg by mouth 3 (Three) Times a Week., Disp: , Rfl:     Return in about 4 weeks (around 7/7/2022), or if symptoms worsen or fail to improve, for Recheck.  Answers for HPI/ROS submitted by the patient on 6/8/2022  Please describe your symptoms.: A feeling of heaviness in the front and top of my head.  It's pressure, but not like my head is going to explode or anything, just a pressure like something is pressing down on this part of my head and making me feel like a veil has been pulled over part of my head.  It's unlike anything I've ever felt before.  Have you had these symptoms before?: Yes  How long have you been having these symptoms?: Greater than 2 weeks  Please list any medications you are currently taking for this condition.: Nothing for this condition.  Please describe any probable cause for these symptoms. : I really don't know.  I had thought it might be due to poor sleep, but that has improved some in the last two weeks.  It's still not great, but at least I'm getting some sleep.  What is the primary reason for your visit?: Other

## 2022-06-16 ENCOUNTER — HOSPITAL ENCOUNTER (OUTPATIENT)
Dept: MRI IMAGING | Facility: HOSPITAL | Age: 67
Discharge: HOME | End: 2022-06-16
Admitting: FAMILY MEDICINE

## 2022-06-16 DIAGNOSIS — G44.52 NPDH (NEW PERSISTENT DAILY HEADACHE): ICD-10-CM

## 2022-06-16 PROCEDURE — 70551 MRI BRAIN STEM W/O DYE: CPT

## 2022-06-17 ENCOUNTER — CLINICAL SUPPORT NO REQUIREMENTS (OUTPATIENT)
Dept: PREADMISSION TESTING | Facility: HOSPITAL | Age: 67
End: 2022-06-17

## 2022-06-17 DIAGNOSIS — Z01.812 ENCOUNTER FOR PREPROCEDURE SCREENING LABORATORY TESTING FOR COVID-19: ICD-10-CM

## 2022-06-17 DIAGNOSIS — Z20.822 ENCOUNTER FOR PREPROCEDURE SCREENING LABORATORY TESTING FOR COVID-19: ICD-10-CM

## 2022-06-17 PROCEDURE — U0005 INFEC AGEN DETEC AMPLI PROBE: HCPCS

## 2022-06-17 PROCEDURE — U0004 COV-19 TEST NON-CDC HGH THRU: HCPCS

## 2022-06-17 PROCEDURE — C9803 HOPD COVID-19 SPEC COLLECT: HCPCS

## 2022-06-18 LAB — SARS-COV-2 RNA PNL SPEC NAA+PROBE: NOT DETECTED

## 2022-06-20 ENCOUNTER — OUTSIDE FACILITY SERVICE (OUTPATIENT)
Dept: GASTROENTEROLOGY | Facility: CLINIC | Age: 67
End: 2022-06-20

## 2022-06-20 PROCEDURE — 43249 ESOPH EGD DILATION <30 MM: CPT | Performed by: INTERNAL MEDICINE

## 2022-06-20 PROCEDURE — 43239 EGD BIOPSY SINGLE/MULTIPLE: CPT | Performed by: INTERNAL MEDICINE

## 2022-06-21 ENCOUNTER — TELEPHONE (OUTPATIENT)
Dept: INTERNAL MEDICINE | Facility: CLINIC | Age: 67
End: 2022-06-21

## 2022-06-21 NOTE — TELEPHONE ENCOUNTER
----- Message from Adela LIN MD sent at 6/20/2022  7:44 PM EDT -----  Brain MRI does not show acute changes.  There is some small vessel changes consistent with aging, and hardening of the artery.  Please follow low animal fat, low sugar, low alcohol diet.

## 2022-06-21 NOTE — TELEPHONE ENCOUNTER
LM on VM to go over MRI results.  Dr. Valdez sent a [employer] message with the results.  Pt has read this.  I let her know that if she any any questions or concern to feel free to contact the office

## 2022-06-24 ENCOUNTER — TELEPHONE (OUTPATIENT)
Dept: FAMILY MEDICINE CLINIC | Facility: CLINIC | Age: 67
End: 2022-06-24

## 2022-06-24 DIAGNOSIS — Z23 NEED FOR COVID-19 VACCINE: Primary | ICD-10-CM

## 2022-06-24 DIAGNOSIS — Z23 NEED FOR VACCINATION AGAINST STREPTOCOCCUS PNEUMONIAE: ICD-10-CM

## 2022-06-24 NOTE — TELEPHONE ENCOUNTER
Reason for Call: PT IS WANTING A 2ND COVID BOOSTER AND PNEUMONIA SHOT, CAN AN ORDER BE PLACED FOR PT? PLEASE ADVISE.     CONTACT [phone]

## 2022-06-29 ENCOUNTER — CLINICAL SUPPORT (OUTPATIENT)
Dept: INTERNAL MEDICINE | Facility: CLINIC | Age: 67
End: 2022-06-29

## 2022-06-29 DIAGNOSIS — Z23 NEED FOR COVID-19 VACCINE: ICD-10-CM

## 2022-06-29 DIAGNOSIS — Z23 NEED FOR VACCINATION AGAINST STREPTOCOCCUS PNEUMONIAE: ICD-10-CM

## 2022-06-29 PROCEDURE — 90677 PCV20 VACCINE IM: CPT | Performed by: FAMILY MEDICINE

## 2022-06-29 PROCEDURE — G0009 ADMIN PNEUMOCOCCAL VACCINE: HCPCS | Performed by: FAMILY MEDICINE

## 2022-06-29 PROCEDURE — 91305 COVID-19 (PFIZER) 12+ YRS: CPT | Performed by: FAMILY MEDICINE

## 2022-06-29 PROCEDURE — 0054A COVID-19 (PFIZER) 12+ YRS: CPT | Performed by: FAMILY MEDICINE

## 2022-07-06 PROBLEM — R06.09 DOE (DYSPNEA ON EXERTION): Status: ACTIVE | Noted: 2022-07-06

## 2022-07-06 PROBLEM — R09.89 CAROTID BRUIT: Status: ACTIVE | Noted: 2022-07-06

## 2022-07-06 PROBLEM — I38 VALVULAR HEART DISEASE: Status: ACTIVE | Noted: 2022-07-06

## 2022-07-07 ENCOUNTER — OFFICE VISIT (OUTPATIENT)
Dept: CARDIOLOGY | Facility: CLINIC | Age: 67
End: 2022-07-07

## 2022-07-07 VITALS
SYSTOLIC BLOOD PRESSURE: 122 MMHG | HEART RATE: 71 BPM | WEIGHT: 143.4 LBS | HEIGHT: 68 IN | BODY MASS INDEX: 21.73 KG/M2 | OXYGEN SATURATION: 98 % | DIASTOLIC BLOOD PRESSURE: 64 MMHG

## 2022-07-07 DIAGNOSIS — E78.2 MIXED HYPERLIPIDEMIA: ICD-10-CM

## 2022-07-07 DIAGNOSIS — R09.89 BRUIT OF RIGHT CAROTID ARTERY: ICD-10-CM

## 2022-07-07 DIAGNOSIS — I38 VALVULAR HEART DISEASE: ICD-10-CM

## 2022-07-07 DIAGNOSIS — R06.09 DOE (DYSPNEA ON EXERTION): Primary | ICD-10-CM

## 2022-07-07 PROCEDURE — 99214 OFFICE O/P EST MOD 30 MIN: CPT | Performed by: INTERNAL MEDICINE

## 2022-07-07 PROCEDURE — 93000 ELECTROCARDIOGRAM COMPLETE: CPT | Performed by: INTERNAL MEDICINE

## 2022-07-07 RX ORDER — LANOLIN ALCOHOL/MO/W.PET/CERES
3 CREAM (GRAM) TOPICAL NIGHTLY PRN
COMMUNITY
End: 2023-03-13

## 2022-07-07 RX ORDER — TRAZODONE HYDROCHLORIDE 150 MG/1
150 TABLET ORAL NIGHTLY
COMMUNITY
End: 2023-01-11

## 2022-09-12 ENCOUNTER — TELEPHONE (OUTPATIENT)
Dept: ORTHOPEDIC SURGERY | Facility: CLINIC | Age: 67
End: 2022-09-12

## 2022-09-12 NOTE — TELEPHONE ENCOUNTER
Provider: CHARLIE  Caller: VERO MILLER  Relationship to Patient: PATIENT  Pharmacy: N/A  Phone Number: [phone]  Reason for Call: PATIENT CALLING TO SCHEDULED RT KNEE INJECTION

## 2022-11-14 ENCOUNTER — TELEPHONE (OUTPATIENT)
Dept: INTERNAL MEDICINE | Facility: CLINIC | Age: 67
End: 2022-11-14

## 2022-11-14 DIAGNOSIS — E78.00 PURE HYPERCHOLESTEROLEMIA: Primary | ICD-10-CM

## 2022-11-14 NOTE — TELEPHONE ENCOUNTER
Caller: Kaitlynn Le    Relationship: Self    Best call back number: [phone]    What medication are you requesting: CHOLESTEROL MEDICATION    If a prescription is needed, what is your preferred pharmacy and phone number: [facility] [id number] [place], [place] - [facility] AT [facility] [phone] [facility] [phone]      Additional notes: THE PATIENT WOULD LIKE TO START CHOLESTEROL MEDICATION, AS PREVIOUSLY DISCUSSED WITH DR. PHAM.    PLEASE CALL BACK TO ADVISE, IF NEEDED.    THANK YOU.

## 2022-11-16 RX ORDER — ATORVASTATIN CALCIUM 10 MG/1
10 TABLET, FILM COATED ORAL DAILY
Qty: 30 TABLET | Refills: 2 | Status: SHIPPED | OUTPATIENT
Start: 2022-11-16 | End: 2023-02-13

## 2022-11-16 NOTE — TELEPHONE ENCOUNTER
Called and lvm for patient to return call, office number given.     HUB: if patient returns call please relay providers message:     Last lab was in March, did something happen? Did she get lab at work?

## 2022-11-16 NOTE — TELEPHONE ENCOUNTER
Caller: Kaitlynn Le    Relationship: Self    Best call back number:    [phone]          What was the call regarding: PATIENT WAS GIVEN HUB TO READ MESSAGE.  PATIENT STATED THAT SHE DID NOT GET LABS.    Do you require a callback: YES

## 2023-01-05 ENCOUNTER — TELEPHONE (OUTPATIENT)
Dept: INTERNAL MEDICINE | Facility: CLINIC | Age: 68
End: 2023-01-05

## 2023-01-05 NOTE — TELEPHONE ENCOUNTER
lipitor and trazodone are contraindicated with paxlovid-she would have to stop both and continue off for a few days after med.  Her age and breast cancer qualify her for medication

## 2023-01-05 NOTE — TELEPHONE ENCOUNTER
Caller: Kaitlynn Le    Relationship to patient: Self    Best call back number: [phone]    Date of positive COVID19 test: 01/05/2023    COVID19 symptoms: CONGESTION, SNEEZING, COUGHING, BODY ACHES MILD SYMPTOMS    Date of initial quarantine: 01/05/2023    Additional information or concerns: PATIENT TESTED POSITIVE TODAY WITH A HOME COVID TEST AND HAS MILD SYMPTOMS AND WANTS TO KNOW IF SHE SHOULD TAKE THE ANTIVIRAL OR NOT? PLEASE ADVISE    What is the patients preferred pharmacy:   [facility] [phone]

## 2023-01-05 NOTE — TELEPHONE ENCOUNTER
Spoke with pt who states she would like to defer at the moment. Pt states her sx are relatively mild.    Instructed to call back if symptoms worsen or do not improve.    Pt verbalized understanding, agreement, and appreciation

## 2023-01-11 RX ORDER — TRAZODONE HYDROCHLORIDE 150 MG/1
TABLET ORAL
Qty: 90 TABLET | Refills: 0 | Status: SHIPPED | OUTPATIENT
Start: 2023-01-11 | End: 2023-04-06 | Stop reason: SDUPTHER

## 2023-02-12 DIAGNOSIS — E78.00 PURE HYPERCHOLESTEROLEMIA: ICD-10-CM

## 2023-02-13 RX ORDER — ATORVASTATIN CALCIUM 10 MG/1
TABLET, FILM COATED ORAL
Qty: 90 TABLET | Refills: 0 | Status: SHIPPED | OUTPATIENT
Start: 2023-02-13

## 2023-03-03 DIAGNOSIS — I38 VALVULAR HEART DISEASE: Primary | ICD-10-CM

## 2023-03-13 ENCOUNTER — OFFICE VISIT (OUTPATIENT)
Dept: INTERNAL MEDICINE | Facility: CLINIC | Age: 68
End: 2023-03-13
Payer: MEDICARE

## 2023-03-13 VITALS
HEIGHT: 68 IN | OXYGEN SATURATION: 98 % | BODY MASS INDEX: 21.67 KG/M2 | TEMPERATURE: 98.4 F | DIASTOLIC BLOOD PRESSURE: 68 MMHG | HEART RATE: 70 BPM | SYSTOLIC BLOOD PRESSURE: 118 MMHG | WEIGHT: 143 LBS

## 2023-03-13 DIAGNOSIS — R05.1 ACUTE COUGH: Primary | ICD-10-CM

## 2023-03-13 DIAGNOSIS — H92.09 EARACHE: ICD-10-CM

## 2023-03-13 LAB
EXPIRATION DATE: NORMAL
FLUAV AG UPPER RESP QL IA.RAPID: NOT DETECTED
FLUBV AG UPPER RESP QL IA.RAPID: NOT DETECTED
INTERNAL CONTROL: NORMAL
Lab: NORMAL
SARS-COV-2 AG UPPER RESP QL IA.RAPID: NOT DETECTED

## 2023-03-13 PROCEDURE — 99213 OFFICE O/P EST LOW 20 MIN: CPT | Performed by: FAMILY MEDICINE

## 2023-03-13 PROCEDURE — 87428 SARSCOV & INF VIR A&B AG IA: CPT | Performed by: FAMILY MEDICINE

## 2023-03-13 RX ORDER — TRAZODONE HYDROCHLORIDE 150 MG/1
150 TABLET ORAL NIGHTLY
COMMUNITY
Start: 2022-05-26 | End: 2023-04-06 | Stop reason: SDUPTHER

## 2023-03-13 RX ORDER — IBUPROFEN 200 MG
CAPSULE ORAL
COMMUNITY
End: 2023-03-13 | Stop reason: DRUGHIGH

## 2023-03-13 RX ORDER — BENZONATATE 100 MG/1
100 CAPSULE ORAL 3 TIMES DAILY PRN
Qty: 21 CAPSULE | Refills: 1 | Status: SHIPPED | OUTPATIENT
Start: 2023-03-13 | End: 2023-04-06

## 2023-03-13 RX ORDER — BACILLUS COAGULANS/INULIN 1B-250 MG
CAPSULE ORAL
COMMUNITY
End: 2023-04-06

## 2023-03-13 RX ORDER — CEFUROXIME AXETIL 500 MG/1
500 TABLET ORAL 2 TIMES DAILY
Qty: 20 TABLET | Refills: 0 | Status: SHIPPED | OUTPATIENT
Start: 2023-03-13 | End: 2023-04-06

## 2023-03-13 RX ORDER — TRIAMCINOLONE ACETONIDE 1 MG/G
CREAM TOPICAL
COMMUNITY

## 2023-03-13 RX ORDER — UBIDECARENONE 75 MG
CAPSULE ORAL
COMMUNITY
End: 2023-03-13 | Stop reason: DRUGHIGH

## 2023-03-13 RX ORDER — KETOCONAZOLE 20 MG/G
CREAM TOPICAL
COMMUNITY

## 2023-03-13 NOTE — PROGRESS NOTES
"Subjective   Kaitlynn Le is a 67 y.o. female.     Chief Complaint   Patient presents with   • Cough     2 weeks     • Earache     9 days       Visit Vitals  /68 (BP Location: Right arm, Patient Position: Sitting, Cuff Size: Adult)   Pulse 70   Temp 98.4 °F (36.9 °C)   Ht 171.5 cm (67.5\")   Wt 64.9 kg (143 lb)   SpO2 98%   BMI 22.07 kg/m²         Cough  This is a new problem. The current episode started 1 to 4 weeks ago. The problem has been unchanged. The problem occurs every few minutes. The cough is non-productive. Associated symptoms include ear congestion, ear pain, nasal congestion, postnasal drip and rhinorrhea. Pertinent negatives include no chest pain, chills, fever (improved), headaches, heartburn, hemoptysis, myalgias, rash, sore throat, shortness of breath, sweats, weight loss or wheezing. Nothing aggravates the symptoms. She has tried OTC cough suppressant for the symptoms. The treatment provided no relief. There is no history of asthma, bronchiectasis, bronchitis, COPD, emphysema, environmental allergies or pneumonia.   Earache   There is pain in the right ear. This is a new problem. The current episode started 1 to 4 weeks ago. The problem occurs constantly. The problem has been unchanged. There has been no fever. Associated symptoms include coughing, hearing loss and rhinorrhea. Pertinent negatives include no abdominal pain, diarrhea, ear discharge, headaches, neck pain, rash, sore throat or vomiting. She has tried acetaminophen for the symptoms. The treatment provided mild relief.      Pt reports that Dr Dawn Hem-Onc stopped her aromasin.     Pt states that she has been sick for 2 weeks and grandson brought home the infection.     The following portions of the patient's history were reviewed and updated as appropriate: allergies, current medications, past family history, past medical history, past social history, past surgical history and problem list.    Past Medical History:   Diagnosis " Date   • Cancer (HCC)     breast   • Elevated cholesterol    • GERD (gastroesophageal reflux disease) 3/12/2019   • History of radiation therapy    • Hyperlipidemia    • Kidney stones 04/13/2017    bilateral   • Osteoarthritis of knee    • Osteopenia of multiple sites 9/19/2018   • Osteoporosis    • Pes planus, flexible    • Sleep apnea    • Tibialis posterior tendinitis    • Urinary tract infection July 2017   • Varicose veins of legs    • Vertigo       Past Surgical History:   Procedure Laterality Date   • BREAST RECONSTRUCTION Bilateral 2014   • COLONOSCOPY  2/252016   • COLONOSCOPY  05/04/2021    Dr Manning   • CYSTOSCOPY  04/13/2017    right ureteral stone Dr Lofton   • CYSTOSCOPY  12/07/2009   • CYSTOSCOPY INSERTION / REMOVAL STENT / STONE  05/01/2009    calcium oxxylate mono   • EXTRACORPOREAL SHOCK WAVE LITHOTRIPSY (ESWL) Left 03/2009   • EXTRACORPOREAL SHOCK WAVE LITHOTRIPSY (ESWL) Bilateral 03/19/2021    Procedure: EXTRACORPOREAL SHOCKWAVE LITHOTRIPSY BILATERAL;  Surgeon: Jayy Lofton Jr., MD;  Location: [facility];  Service: Urology;  Laterality: Bilateral;   • HERNIA REPAIR     • KNEE ARTHROSCOPY W/ MENISCAL REPAIR Right 2011   • MASTECTOMY MODIFIED RADICAL / SIMPLE / COMPLETE Left 2013    breast cancer   • SIMPLE MASTECTOMY Right 2013    hx breast Ca left   • SKIN BIOPSY  07/2017    mid thoracic back Dr Salinas   • TONSILLECTOMY  09/2015   • UPPER GASTROINTESTINAL ENDOSCOPY  06/20/2022    Hugh   • WISDOM TOOTH EXTRACTION  2010      Family History   Problem Relation Age of Onset   • Breast cancer Mother    • Mitral valve prolapse Mother    • Heart failure Mother    • Heart disease Mother         Congestive heart failure   • Mitral valve prolapse Father    • Heart disease Father         Mitral valve replacement  1984   • Valvular heart disease Father         mitral valve replaced   • Prostate cancer Father    • Migraines Sister    • Mitral valve prolapse Sister    • Valvular heart  disease Sister         mitral valve replaced   • Prostate cancer Brother    • Mitral valve prolapse Brother    • Heart disease Brother         Mitral valve replacement  2005   • Valvular heart disease Brother         mitral valve replaced   • Cancer Maternal Grandmother         liver   • Heart attack Maternal Grandfather    • No Known Problems Paternal Grandmother    • No Known Problems Paternal Grandfather    • Cancer Other    • Colon cancer Neg Hx    • Colon polyps Neg Hx       Social History     Socioeconomic History   • Marital status:    Tobacco Use   • Smoking status: Never   • Smokeless tobacco: Never   Vaping Use   • Vaping Use: Never used   Substance and Sexual Activity   • Alcohol use: No   • Drug use: No   • Sexual activity: Yes     Partners: Male     Birth control/protection: Post-menopausal     Comment:       Allergies   Allergen Reactions   • Caffeine Other (See Comments)     Headaches, speeds up heart rate, gets acne with chocolate, keeps her from sleeping   • Ibuprofen GI Intolerance   • Adhesive Tape Rash     Paper tape       Review of Systems   Constitutional: Negative for chills, fever (improved) and weight loss.   HENT: Positive for ear pain, hearing loss, postnasal drip and rhinorrhea. Negative for ear discharge and sore throat.    Respiratory: Positive for cough. Negative for hemoptysis, shortness of breath and wheezing.    Cardiovascular: Negative for chest pain.   Gastrointestinal: Negative for abdominal pain, diarrhea, heartburn and vomiting.   Musculoskeletal: Negative for myalgias and neck pain.   Skin: Negative for rash.   Allergic/Immunologic: Negative for environmental allergies.   Neurological: Negative for headaches.       Objective   Physical Exam  Vitals and nursing note reviewed.   Constitutional:       Appearance: She is well-developed.   HENT:      Head: Normocephalic.      Right Ear: Tympanic membrane, ear canal and external ear normal.      Left Ear: Tympanic  membrane, ear canal and external ear normal.      Nose: Nose normal.      Right Sinus: No maxillary sinus tenderness or frontal sinus tenderness.      Left Sinus: No maxillary sinus tenderness or frontal sinus tenderness.   Eyes:      General: Lids are normal.      Conjunctiva/sclera: Conjunctivae normal.      Pupils: Pupils are equal, round, and reactive to light.   Neck:      Thyroid: No thyroid mass or thyromegaly.      Vascular: No carotid bruit.      Trachea: Trachea normal.   Cardiovascular:      Rate and Rhythm: Normal rate and regular rhythm.      Heart sounds: No murmur heard.  Pulmonary:      Effort: Pulmonary effort is normal. No respiratory distress.      Breath sounds: Normal breath sounds. No decreased breath sounds, wheezing, rhonchi or rales.   Chest:      Chest wall: No tenderness.   Abdominal:      General: Bowel sounds are normal.      Palpations: Abdomen is soft.      Tenderness: There is no abdominal tenderness.   Musculoskeletal:         General: Normal range of motion.      Cervical back: Normal range of motion and neck supple.   Skin:     General: Skin is warm and dry.   Neurological:      Mental Status: She is alert and oriented to person, place, and time.   Psychiatric:         Behavior: Behavior normal.         Assessment & Plan   Diagnoses and all orders for this visit:    1. Acute cough (Primary)  -     POCT SARS-CoV-2 Antigen JOSE LUIS + Flu  -     cefuroxime (CEFTIN) 500 MG tablet; Take 1 tablet by mouth 2 (Two) Times a Day.  Dispense: 20 tablet; Refill: 0  -     benzonatate (Tessalon Perles) 100 MG capsule; Take 1 capsule by mouth 3 (Three) Times a Day As Needed for Cough.  Dispense: 21 capsule; Refill: 1    2. Earache  -     POCT SARS-CoV-2 Antigen JOSE LUIS + Flu  -     cefuroxime (CEFTIN) 500 MG tablet; Take 1 tablet by mouth 2 (Two) Times a Day.  Dispense: 20 tablet; Refill: 0                   Current Outpatient Medications:   •  atorvastatin (LIPITOR) 10 MG tablet, TAKE ONE TABLET BY MOUTH  DAILY, Disp: 90 tablet, Rfl: 0  •  Calcium Citrate-Vitamin D (CALCIUM CITRATE + D PO), Take 2 tablets by mouth 2 (Two) Times a Day., Disp: , Rfl:   •  hydrocortisone 2.5 % cream, Apply 1 application topically to the appropriate area as directed As Needed., Disp: , Rfl:   •  metroNIDAZOLE (METROCREAM) 0.75 % cream, Apply 1 application topically to the appropriate area as directed As Needed., Disp: , Rfl:   •  nystatin (MYCOSTATIN) 936673 UNIT/GM cream, Apply 1 application topically to the appropriate area as directed As Needed., Disp: , Rfl:   •  Probiotic Product (Align) capsule, Take 1 capsule by mouth Daily., Disp: , Rfl:   •  traZODone (DESYREL) 150 MG tablet, TAKE ONE TABLET BY MOUTH ONCE NIGHTLY, Disp: 90 tablet, Rfl: 0  •  traZODone (DESYREL) 150 MG tablet, Take 1 tablet by mouth Every Night., Disp: , Rfl:   •  vitamin B-12 (CYANOCOBALAMIN) 500 MCG tablet, Take 1 tablet by mouth 3 (Three) Times a Week., Disp: , Rfl:   •  Bacillus Coagulans-Inulin (Probiotic) 1-250 BILLION-MG capsule, Probiotic  Daily, Disp: , Rfl:   •  benzonatate (Tessalon Perles) 100 MG capsule, Take 1 capsule by mouth 3 (Three) Times a Day As Needed for Cough., Disp: 21 capsule, Rfl: 1  •  cefuroxime (CEFTIN) 500 MG tablet, Take 1 tablet by mouth 2 (Two) Times a Day., Disp: 20 tablet, Rfl: 0  •  ketoconazole (NIZORAL) 2 % cream, ketoconazole 2 % topical cream  APPLY TO THE AFFECTED AREA(S) ON FACE AND EARS BY TOPICAL ROUTE ONCE DAILY, Disp: , Rfl:   •  triamcinolone (KENALOG) 0.1 % cream, triamcinolone acetonide 0.1 % topical cream  APPLY A THIN LAYER TO THE AFFECTED AREA(S) ON UPPER GLUTEAL CLEFT BY TOPICAL ROUTE 2 TIMES PER DAY X 2 WEEKS PRN FLARES, Disp: , Rfl:     Return in about 22 days (around 4/4/2023), or if symptoms worsen or fail to improve, for Next scheduled follow up, Recheck.     Recent Results (from the past 168 hour(s))   POCT SARS-CoV-2 Antigen JOSE LUIS + Flu    Collection Time: 03/13/23 11:16 AM    Specimen: Swab   Result Value  Ref Range    SARS Antigen Not Detected Not Detected, Presumptive Negative    Influenza A Antigen JOSE LUIS Not Detected Not Detected    Influenza B Antigen JOSE LUIS Not Detected Not Detected    Internal Control Passed Passed    Lot Number [id number]     Expiration Date 3/7/2024

## 2023-03-20 ENCOUNTER — TELEPHONE (OUTPATIENT)
Dept: INTERNAL MEDICINE | Facility: CLINIC | Age: 68
End: 2023-03-20
Payer: MEDICARE

## 2023-03-20 RX ORDER — DOXYCYCLINE 100 MG/1
100 CAPSULE ORAL 2 TIMES DAILY
Qty: 20 CAPSULE | Refills: 0 | Status: SHIPPED | OUTPATIENT
Start: 2023-03-20 | End: 2023-04-06

## 2023-03-20 NOTE — TELEPHONE ENCOUNTER
Caller: Kaitlynn Le    Relationship: Self    Best call back number:[phone]    What is the best time to reach you: ANYTIME    Who are you requesting to speak with (clinical staff, provider,  specific staff member): CLINICAL STAFF    Do you know the name of the person who called: PATIENT    What was the call regarding: NO IMPROVEMENT AFTER TAKING cefuroxime (CEFTIN) 500 MG tablet; SHOULD SHE BE SWITCHED  TO AN ALTERNATIVE MEDICATION. SHE HAS 3 DAYS LEFT OF THIS MEDICATION    [facility] [id number] [address] [place] AT [facility] [phone] [facility] [phone] FX        Do you require a callback:YES

## 2023-03-20 NOTE — TELEPHONE ENCOUNTER
Called and spoke with patient, informed her that new medication has been sent to the pharmacy. She verbalized understanding and had no further questions.

## 2023-04-06 ENCOUNTER — OFFICE VISIT (OUTPATIENT)
Dept: INTERNAL MEDICINE | Facility: CLINIC | Age: 68
End: 2023-04-06
Payer: MEDICARE

## 2023-04-06 VITALS
SYSTOLIC BLOOD PRESSURE: 130 MMHG | DIASTOLIC BLOOD PRESSURE: 70 MMHG | TEMPERATURE: 97.5 F | HEIGHT: 66 IN | OXYGEN SATURATION: 97 % | BODY MASS INDEX: 23.95 KG/M2 | HEART RATE: 60 BPM | WEIGHT: 149 LBS

## 2023-04-06 DIAGNOSIS — F51.04 PSYCHOPHYSIOLOGICAL INSOMNIA: ICD-10-CM

## 2023-04-06 DIAGNOSIS — M54.2 NECK PAIN: ICD-10-CM

## 2023-04-06 DIAGNOSIS — E78.2 MIXED HYPERLIPIDEMIA: ICD-10-CM

## 2023-04-06 DIAGNOSIS — Z79.899 ENCOUNTER FOR LONG-TERM (CURRENT) USE OF MEDICATIONS: ICD-10-CM

## 2023-04-06 DIAGNOSIS — Z00.00 MEDICARE ANNUAL WELLNESS VISIT, SUBSEQUENT: Primary | ICD-10-CM

## 2023-04-06 DIAGNOSIS — R42 VERTIGO: ICD-10-CM

## 2023-04-06 PROCEDURE — 1160F RVW MEDS BY RX/DR IN RCRD: CPT | Performed by: FAMILY MEDICINE

## 2023-04-06 PROCEDURE — G0439 PPPS, SUBSEQ VISIT: HCPCS | Performed by: FAMILY MEDICINE

## 2023-04-06 PROCEDURE — 1170F FXNL STATUS ASSESSED: CPT | Performed by: FAMILY MEDICINE

## 2023-04-06 PROCEDURE — 1159F MED LIST DOCD IN RCRD: CPT | Performed by: FAMILY MEDICINE

## 2023-04-06 RX ORDER — TRAZODONE HYDROCHLORIDE 150 MG/1
150 TABLET ORAL NIGHTLY
Qty: 90 TABLET | Refills: 1 | Status: SHIPPED | OUTPATIENT
Start: 2023-04-06

## 2023-04-06 NOTE — PROGRESS NOTES
The ABCs of the Annual Wellness Visit  Subsequent Medicare Wellness Visit    Subjective    Kaitlynn Le is a 67 y.o. female who presents for a Subsequent Medicare Wellness Visit.    Pt sees massage therapist Estefany Barry monthly for her neck pain and vertigo. Pt asked for referral.    Pt is taking atorvastatin for hyperlipidemia and has not had recent lab work.   The following portions of the patient's history were reviewed and   updated as appropriate: allergies, current medications, past family history, past medical history, past social history, past surgical history and problem list.    Past Medical History:   Diagnosis Date   • Cancer     breast   • Elevated cholesterol    • GERD (gastroesophageal reflux disease) 03/12/2019   • Heart murmur June 2022   • History of radiation therapy    • Hyperlipidemia    • Kidney stones 04/13/2017    bilateral   • Osteoarthritis of knee    • Osteopenia of multiple sites 09/19/2018   • Osteoporosis    • Pes planus, flexible    • Sleep apnea    • Tibialis posterior tendinitis    • Urinary tract infection 07/2017   • Varicose veins of legs    • Vertigo        Past Surgical History:   Procedure Laterality Date   • BREAST RECONSTRUCTION Bilateral 2014   • COLONOSCOPY  2/252016   • COLONOSCOPY  05/04/2021    Dr Manning   • CYSTOSCOPY  04/13/2017    right ureteral stone Dr Lofton   • CYSTOSCOPY  12/07/2009   • CYSTOSCOPY INSERTION / REMOVAL STENT / STONE  05/01/2009    calcium oxxylate mono   • EXTRACORPOREAL SHOCK WAVE LITHOTRIPSY (ESWL) Left 03/2009   • EXTRACORPOREAL SHOCK WAVE LITHOTRIPSY (ESWL) Bilateral 03/19/2021    Procedure: EXTRACORPOREAL SHOCKWAVE LITHOTRIPSY BILATERAL;  Surgeon: Jayy Lofton Jr., MD;  Location: [facility];  Service: Urology;  Laterality: Bilateral;   • HERNIA REPAIR     • KNEE ARTHROSCOPY W/ MENISCAL REPAIR Right 2011   • MASTECTOMY MODIFIED RADICAL / SIMPLE / COMPLETE Left 2013    breast cancer   • SIMPLE MASTECTOMY Right 2013    hx breast  Ca left   • SKIN BIOPSY  07/2017    mid thoracic back Dr Salinas   • TONSILLECTOMY  09/2015   • UPPER GASTROINTESTINAL ENDOSCOPY  06/20/2022    Hugh   • WISDOM TOOTH EXTRACTION  2010       Allergies   Allergen Reactions   • Caffeine Other (See Comments)     Headaches, speeds up heart rate, gets acne with chocolate, keeps her from sleeping   • Ibuprofen GI Intolerance   • Adhesive Tape Rash     Paper tape       Family History   Problem Relation Age of Onset   • Breast cancer Mother    • Mitral valve prolapse Mother    • Heart failure Mother    • Heart disease Mother         Congestive heart failure   • Cancer Mother         Breast   • Mitral valve prolapse Father    • Heart disease Father         Mitral valve replacement  1984   • Valvular heart disease Father         mitral valve replaced   • Prostate cancer Father    • Cancer Father         Prostate   • Migraines Sister    • Mitral valve prolapse Sister    • Valvular heart disease Sister         mitral valve replaced   • Prostate cancer Brother    • Mitral valve prolapse Brother    • Heart disease Brother         Mitral valve replacement  2005   • Valvular heart disease Brother         mitral valve replaced   • Cancer Maternal Grandmother         Liver   • Heart attack Maternal Grandfather    • Mental illness Paternal Grandmother    • No Known Problems Paternal Grandfather    • Cancer Other    • Heart disease Sister         Mitral valve replacement  2020   • Colon cancer Neg Hx    • Colon polyps Neg Hx        Social History     Socioeconomic History   • Marital status:    Tobacco Use   • Smoking status: Never   • Smokeless tobacco: Never   Vaping Use   • Vaping Use: Never used   Substance and Sexual Activity   • Alcohol use: No   • Drug use: No   • Sexual activity: Yes     Partners: Male     Birth control/protection: Post-menopausal     Comment:        Compared to one year ago, the patient feels her physical   health is the same.    Compared to  one year ago, the patient feels her mental   health is the same.    Recent Hospitalizations:  She was not admitted to the hospital during the last year.       Current Medical Providers:  Patient Care Team:  Adela Valdez MD as PCP - General (Family Medicine)  Jahaira Dawn MD as Consulting Physician (Hematology)  Jayy Lofton Jr., MD as Consulting Physician (Urology)  Lv Ramos MD as Consulting Physician (Dermatology)  Ron Pinzon MD (Inactive) as Consulting Physician (Gynecology)  Shyla Rowell as Audiologist  Harsh Moreno MD as Consulting Physician (Orthopedic Surgery)  Lucio Currie APRN as Nurse Practitioner (Nurse Practitioner)  Maggy Carr APRN as Nurse Practitioner (Nurse Practitioner)  Jey Diaz MD as Consulting Physician (Otolaryngology)  Sebastian Lee MD as Consulting Physician (Gastroenterology)  Dina Ford APRN as Nurse Practitioner (Nurse Practitioner)  Arnold Blount MD as Consulting Physician (Cardiology)  jacob guillory (Dermatology)  Celine Eisenberg (Sleep Medicine)  Lyla Russell (Ophthalmology)  Jahaira Dawn MD as Consulting Physician (Hematology)    Outpatient Medications Prior to Visit   Medication Sig Dispense Refill   • atorvastatin (LIPITOR) 10 MG tablet TAKE ONE TABLET BY MOUTH DAILY 90 tablet 0   • Calcium Citrate-Vitamin D (CALCIUM CITRATE + D PO) Take 2 tablets by mouth 2 (Two) Times a Day.     • ketoconazole (NIZORAL) 2 % cream ketoconazole 2 % topical cream   APPLY TO THE AFFECTED AREA(S) ON FACE AND EARS BY TOPICAL ROUTE ONCE DAILY     • metroNIDAZOLE (METROCREAM) 0.75 % cream Apply 1 application topically to the appropriate area as directed As Needed.     • nystatin (MYCOSTATIN) 534267 UNIT/GM cream Apply 1 application topically to the appropriate area as directed As Needed.     • Probiotic Product (Align) capsule Take 1 capsule by mouth Daily.     • triamcinolone  (KENALOG) 0.1 % cream triamcinolone acetonide 0.1 % topical cream   APPLY A THIN LAYER TO THE AFFECTED AREA(S) ON UPPER GLUTEAL CLEFT BY TOPICAL ROUTE 2 TIMES PER DAY X 2 WEEKS PRN FLARES     • vitamin B-12 (CYANOCOBALAMIN) 500 MCG tablet Take 1 tablet by mouth 3 (Three) Times a Week.     • traZODone (DESYREL) 150 MG tablet Take 1 tablet by mouth Every Night.     • Bacillus Coagulans-Inulin (Probiotic) 1-250 BILLION-MG capsule Probiotic   Daily     • benzonatate (Tessalon Perles) 100 MG capsule Take 1 capsule by mouth 3 (Three) Times a Day As Needed for Cough. 21 capsule 1   • cefuroxime (CEFTIN) 500 MG tablet Take 1 tablet by mouth 2 (Two) Times a Day. 20 tablet 0   • doxycycline (MONODOX) 100 MG capsule Take 1 capsule by mouth 2 (Two) Times a Day. 20 capsule 0   • hydrocortisone 2.5 % cream Apply 1 application topically to the appropriate area as directed As Needed.     • traZODone (DESYREL) 150 MG tablet TAKE ONE TABLET BY MOUTH ONCE NIGHTLY 90 tablet 0     No facility-administered medications prior to visit.       No opioid medication identified on active medication list. I have reviewed chart for other potential  high risk medication/s and harmful drug interactions in the elderly.          Aspirin is not on active medication list.  Aspirin use is not indicated based on review of current medical condition/s. Risk of harm outweighs potential benefits.  .    Patient Active Problem List   Diagnosis   • Kidney stones   • History of cancer of left breast   • Iron deficiency   • Hyperlipidemia   • Varicose veins of legs   • Moderate obstructive sleep apnea   • Retrognathia   • Psychophysiological insomnia   • Osteopenia of multiple sites   • GERD (gastroesophageal reflux disease)   • Nonintractable episodic headache   • Muscle spasm   • WALKER (dyspnea on exertion)   • Valvular heart disease   • Carotid bruit     Advance Care Planning   Advance Care Planning     Advance Directive is on file.  ACP discussion was held with  "the patient during this visit. Patient has an advance directive in EMR which is still valid.      Objective    Vitals:    04/06/23 1049   BP: 130/70   BP Location: Right arm   Patient Position: Sitting   Cuff Size: Adult   Pulse: 60   Temp: 97.5 °F (36.4 °C)   SpO2: 97%   Weight: 67.6 kg (149 lb)   Height: 168.3 cm (66.25\")   PainSc: 0-No pain     Estimated body mass index is 23.87 kg/m² as calculated from the following:    Height as of this encounter: 168.3 cm (66.25\").    Weight as of this encounter: 67.6 kg (149 lb).    BMI is within normal parameters. No other follow-up for BMI required.      Does the patient have evidence of cognitive impairment? No          HEALTH RISK ASSESSMENT    Smoking Status:  Social History     Tobacco Use   Smoking Status Never   Smokeless Tobacco Never     Alcohol Consumption:  Social History     Substance and Sexual Activity   Alcohol Use No     Fall Risk Screen:    STEADI Fall Risk Assessment was completed, and patient is at LOW risk for falls.Assessment completed on:4/6/2023    Depression Screening:  PHQ-2/PHQ-9 Depression Screening 4/6/2023   Little Interest or Pleasure in Doing Things 0-->not at all   Feeling Down, Depressed or Hopeless 0-->not at all   PHQ-9: Brief Depression Severity Measure Score 0       Health Habits and Functional and Cognitive Screening:  Functional & Cognitive Status 4/6/2023   Do you have difficulty preparing food and eating? No   Do you have difficulty bathing yourself, getting dressed or grooming yourself? No   Do you have difficulty using the toilet? No   Do you have difficulty moving around from place to place? No   Do you have trouble with steps or getting out of a bed or a chair? No   Current Diet Well Balanced Diet   Dental Exam Up to date        Dental Exam Comment 4/2/2023   Eye Exam Up to date        Eye Exam Comment 2/3/2023   Exercise (times per week) 7 times per week   Current Exercises Include Walking        Exercise Comment walks 2-4 miles " per day   Do you need help using the phone?  No   Are you deaf or do you have serious difficulty hearing?  No   Do you need help with transportation? No   Do you need help shopping? No   Do you need help preparing meals?  No   Do you need help with housework?  No   Do you need help with laundry? No   Do you need help taking your medications? No   Do you need help managing money? No   Do you ever drive or ride in a car without wearing a seat belt? No   Have you felt unusual stress, anger or loneliness in the last month? No   Who do you live with? Spouse   If you need help, do you have trouble finding someone available to you? No   Have you been bothered in the last four weeks by sexual problems? No   Do you have difficulty concentrating, remembering or making decisions? No       Age-appropriate Screening Schedule:  Refer to the list below for future screening recommendations based on patient's age, sex and/or medical conditions. Orders for these recommended tests are listed in the plan section. The patient has been provided with a written plan.    Health Maintenance   Topic Date Due   • PAP SMEAR  09/13/2020   • LIPID PANEL  03/31/2023   • INFLUENZA VACCINE  08/01/2023   • DXA SCAN  10/22/2023   • ANNUAL WELLNESS VISIT  04/06/2024   • TDAP/TD VACCINES (2 - Td or Tdap) 03/12/2029   • COLORECTAL CANCER SCREENING  05/06/2031   • HEPATITIS C SCREENING  Completed   • COVID-19 Vaccine  Completed   • Pneumococcal Vaccine 65+  Completed   • ZOSTER VACCINE  Completed   • MAMMOGRAM  Discontinued                  CMS Preventative Services Quick Reference  Risk Factors Identified During Encounter  Hearing Problem: pt has pending ENT appt  The above risks/problems have been discussed with the patient.  Pertinent information has been shared with the patient in the After Visit Summary.  An After Visit Summary and PPPS were made available to the patient.    Follow Up:   Next Medicare Wellness visit to be scheduled in 1 year.        Additional E&M Note during same encounter follows:  Patient has multiple medical problems which are significant and separately identifiable that require additional work above and beyond the Medicare Wellness Visit.      Chief Complaint  Medicare Wellness-subsequent    Subjective      Insomnia  This is a chronic problem. The current episode started more than 1 year ago. The problem occurs constantly. The problem has been unchanged. Associated symptoms include neck pain (pt sees massage therapist monthly ) and vertigo (improved with massage therapy). Pertinent negatives include no abdominal pain, anorexia, arthralgias, change in bowel habit, chest pain, chills, congestion, coughing, diaphoresis, fatigue, fever, headaches, joint swelling, myalgias, nausea, numbness, rash, sore throat, swollen glands, urinary symptoms, visual change, vomiting or weakness. The symptoms are aggravated by stress. Treatments tried: trazodone. The treatment provided significant relief.   Neck Pain   This is a chronic problem. The current episode started more than 1 year ago. The problem occurs constantly. The problem has been unchanged. The pain is associated with nothing. The pain is present in the right side and occipital region. The quality of the pain is described as aching (intermittent). The symptoms are aggravated by stress. The pain is same all the time. Pertinent negatives include no chest pain, fever, headaches, leg pain, numbness, pain with swallowing, paresis, photophobia, syncope, tingling, trouble swallowing, visual change, weakness or weight loss. Treatments tried: massage therapy. The treatment provided significant relief.     Kaitlynn Le is also being seen today for insomnia and neck pain    Review of Systems   Constitutional: Negative for activity change, appetite change, chills, diaphoresis, fatigue, fever and unexpected weight loss.   HENT: Positive for hearing loss (wearing hearing aids). Negative for  "congestion, dental problem, drooling, ear discharge, ear pain, facial swelling, mouth sores, nosebleeds, postnasal drip, rhinorrhea, sinus pressure, sneezing, sore throat, swollen glands, tinnitus, trouble swallowing and voice change.    Eyes: Negative for photophobia, pain, discharge, redness, itching and visual disturbance.   Respiratory: Negative for apnea, cough, choking, chest tightness, shortness of breath, wheezing and stridor.    Cardiovascular: Negative for chest pain, palpitations, leg swelling and syncope.   Gastrointestinal: Negative for abdominal distention, abdominal pain, anal bleeding, anorexia, blood in stool, change in bowel habit, constipation, diarrhea, nausea, rectal pain and vomiting.   Endocrine: Negative for cold intolerance, heat intolerance, polydipsia, polyphagia and polyuria.   Genitourinary: Negative for decreased urine volume, difficulty urinating, dyspareunia, dysuria, enuresis, flank pain, frequency, genital sores, hematuria, menstrual problem, pelvic pain, urgency, vaginal bleeding, vaginal discharge and vaginal pain.   Musculoskeletal: Positive for neck pain (pt sees massage therapist monthly ). Negative for arthralgias, back pain, gait problem, joint swelling, myalgias and neck stiffness.   Skin: Negative for rash.   Neurological: Positive for vertigo (improved with massage therapy). Negative for tingling, weakness and numbness.   Psychiatric/Behavioral: The patient has insomnia.        Objective   Vital Signs:  /70 (BP Location: Right arm, Patient Position: Sitting, Cuff Size: Adult)   Pulse 60   Temp 97.5 °F (36.4 °C)   Ht 168.3 cm (66.25\")   Wt 67.6 kg (149 lb)   SpO2 97%   BMI 23.87 kg/m²     Physical Exam  Vitals and nursing note reviewed.   Constitutional:       General: She is not in acute distress.     Appearance: She is well-developed. She is not diaphoretic.   HENT:      Head: Normocephalic and atraumatic.      Right Ear: Tympanic membrane, ear canal and " external ear normal. Decreased hearing noted.      Left Ear: Tympanic membrane, ear canal and external ear normal. Decreased hearing noted.      Ears:      Comments: Bilateral hearing aids     Nose: Nose normal.      Mouth/Throat:      Lips: Pink.      Mouth: Mucous membranes are moist. Mucous membranes are not pale, not dry and not cyanotic. No injury.      Dentition: Normal dentition.      Tongue: No lesions.      Palate: No mass.      Pharynx: Uvula midline. No pharyngeal swelling, oropharyngeal exudate, posterior oropharyngeal erythema or uvula swelling.   Eyes:      General: Lids are normal. No scleral icterus.        Right eye: No foreign body, discharge or hordeolum.         Left eye: No foreign body, discharge or hordeolum.      Extraocular Movements:      Right eye: Normal extraocular motion and no nystagmus.      Left eye: Normal extraocular motion and no nystagmus.      Conjunctiva/sclera: Conjunctivae normal.      Right eye: Right conjunctiva is not injected. No chemosis, exudate or hemorrhage.     Left eye: Left conjunctiva is not injected. No chemosis, exudate or hemorrhage.     Pupils: Pupils are equal, round, and reactive to light.   Neck:      Thyroid: No thyroid mass or thyromegaly.      Vascular: No carotid bruit.      Trachea: Trachea normal. No tracheal deviation.   Cardiovascular:      Rate and Rhythm: Normal rate and regular rhythm.      Pulses:           Dorsalis pedis pulses are 2+ on the right side and 2+ on the left side.        Posterior tibial pulses are 2+ on the right side and 2+ on the left side.      Heart sounds: Normal heart sounds. No murmur heard.    No friction rub. No gallop.   Pulmonary:      Effort: Pulmonary effort is normal. No respiratory distress.      Breath sounds: Normal breath sounds. No decreased breath sounds, wheezing, rhonchi or rales.   Chest:      Chest wall: No tenderness. There is no dullness to percussion.   Breasts:     Right: Absent. No mass, skin change or  tenderness.      Left: Absent. No mass, skin change or tenderness.      Comments: Bilateral breast implants  Abdominal:      General: Bowel sounds are normal. There is no distension.      Palpations: Abdomen is soft. There is no hepatomegaly, splenomegaly or mass.      Tenderness: There is no abdominal tenderness. There is no guarding or rebound.      Hernia: No hernia is present.   Musculoskeletal:         General: No tenderness or deformity. Normal range of motion.      Cervical back: Normal range of motion and neck supple.      Right lower leg: No edema.      Left lower leg: No edema.   Lymphadenopathy:      Head:      Right side of head: No submandibular adenopathy.      Left side of head: No submandibular adenopathy.      Cervical: No cervical adenopathy.      Right cervical: No superficial, deep or posterior cervical adenopathy.     Left cervical: No superficial, deep or posterior cervical adenopathy.      Upper Body:      Right upper body: No supraclavicular, axillary or pectoral adenopathy.      Left upper body: No supraclavicular, axillary or pectoral adenopathy.   Skin:     General: Skin is warm and dry.      Findings: No rash.      Nails: There is no clubbing.   Neurological:      Mental Status: She is alert and oriented to person, place, and time.      Cranial Nerves: No cranial nerve deficit.      Sensory: No sensory deficit.      Coordination: Coordination normal.      Deep Tendon Reflexes: Reflexes are normal and symmetric.      Reflex Scores:       Bicep reflexes are 2+ on the right side and 2+ on the left side.       Brachioradialis reflexes are 2+ on the right side and 2+ on the left side.       Patellar reflexes are 2+ on the right side and 2+ on the left side.  Psychiatric:         Attention and Perception: Attention normal.         Mood and Affect: Mood normal.         Speech: Speech normal.         Behavior: Behavior normal.         Thought Content: Thought content normal.         Cognition and  Memory: Cognition normal.         Judgment: Judgment normal.                   Assessment and Plan Diagnoses and all orders for this visit:    1. Medicare annual wellness visit, subsequent (Primary)    2. Psychophysiological insomnia  -     traZODone (DESYREL) 150 MG tablet; Take 1 tablet by mouth Every Night.  Dispense: 90 tablet; Refill: 1    3. Vertigo  -     Ambulatory Referral to Massage Therapy    4. Neck pain  -     Ambulatory Referral to Massage Therapy    5. Mixed hyperlipidemia  -     Comprehensive Metabolic Panel; Future  -     Lipid Panel; Future    6. Encounter for long-term (current) use of medications  -     Comprehensive Metabolic Panel; Future  -     TSH Rfx On Abnormal To Free T4; Future  -     Lipid Panel; Future  -     CBC & Differential; Future           I spent 25 minutes caring for Kaitlynn on this date of service. This time includes time spent by me in the following activities:preparing for the visit, reviewing tests, obtaining and/or reviewing a separately obtained history, performing a medically appropriate examination and/or evaluation , counseling and educating the patient/family/caregiver, ordering medications, tests, or procedures, referring and communicating with other health care professionals  and documenting information in the medical record  Follow Up Return in about 6 months (around 10/6/2023), or if symptoms worsen or fail to improve, for Recheck lipid.  Patient was given instructions and counseling regarding her condition or for health maintenance advice. Please see specific information pulled into the AVS if appropriate.     Please follow a low animal fat diet that is also low in sugar, low in junk food, low in sweet drinks and low in alcohol.  Please increase the amount of fiber in your diet as well as increasing your daily exercise, such as walking.    Handouts to pt on Fall risk, advance care directives, healthy diets such as Mediterranean or Dash or calorie counting, and  healthy exercising.

## 2023-05-10 ENCOUNTER — LAB (OUTPATIENT)
Dept: INTERNAL MEDICINE | Facility: CLINIC | Age: 68
End: 2023-05-10
Payer: MEDICARE

## 2023-05-10 DIAGNOSIS — Z79.899 ENCOUNTER FOR LONG-TERM (CURRENT) USE OF MEDICATIONS: ICD-10-CM

## 2023-05-10 DIAGNOSIS — E78.2 MIXED HYPERLIPIDEMIA: ICD-10-CM

## 2023-05-10 LAB
ALBUMIN SERPL-MCNC: 4.1 G/DL (ref 3.5–5.2)
ALBUMIN/GLOB SERPL: 1.8 G/DL
ALP SERPL-CCNC: 75 U/L (ref 39–117)
ALT SERPL W P-5'-P-CCNC: 28 U/L (ref 1–33)
ANION GAP SERPL CALCULATED.3IONS-SCNC: 9.6 MMOL/L (ref 5–15)
AST SERPL-CCNC: 27 U/L (ref 1–32)
BASOPHILS # BLD AUTO: 0.01 10*3/MM3 (ref 0–0.2)
BASOPHILS NFR BLD AUTO: 0.3 % (ref 0–1.5)
BILIRUB SERPL-MCNC: 0.4 MG/DL (ref 0–1.2)
BUN SERPL-MCNC: 17 MG/DL (ref 8–23)
BUN/CREAT SERPL: 18.5 (ref 7–25)
CALCIUM SPEC-SCNC: 9.4 MG/DL (ref 8.6–10.5)
CHLORIDE SERPL-SCNC: 106 MMOL/L (ref 98–107)
CHOLEST SERPL-MCNC: 200 MG/DL (ref 0–200)
CO2 SERPL-SCNC: 27.4 MMOL/L (ref 22–29)
CREAT SERPL-MCNC: 0.92 MG/DL (ref 0.57–1)
DEPRECATED RDW RBC AUTO: 43.8 FL (ref 37–54)
EGFRCR SERPLBLD CKD-EPI 2021: 68.4 ML/MIN/1.73
EOSINOPHIL # BLD AUTO: 0.11 10*3/MM3 (ref 0–0.4)
EOSINOPHIL NFR BLD AUTO: 3.5 % (ref 0.3–6.2)
ERYTHROCYTE [DISTWIDTH] IN BLOOD BY AUTOMATED COUNT: 12.3 % (ref 12.3–15.4)
GLOBULIN UR ELPH-MCNC: 2.3 GM/DL
GLUCOSE SERPL-MCNC: 91 MG/DL (ref 65–99)
HCT VFR BLD AUTO: 37.7 % (ref 34–46.6)
HDLC SERPL-MCNC: 96 MG/DL (ref 40–60)
HGB BLD-MCNC: 12.8 G/DL (ref 12–15.9)
IMM GRANULOCYTES # BLD AUTO: 0.01 10*3/MM3 (ref 0–0.05)
IMM GRANULOCYTES NFR BLD AUTO: 0.3 % (ref 0–0.5)
LDLC SERPL CALC-MCNC: 97 MG/DL (ref 0–100)
LDLC/HDLC SERPL: 1.01 {RATIO}
LYMPHOCYTES # BLD AUTO: 1.39 10*3/MM3 (ref 0.7–3.1)
LYMPHOCYTES NFR BLD AUTO: 44 % (ref 19.6–45.3)
MCH RBC QN AUTO: 32.6 PG (ref 26.6–33)
MCHC RBC AUTO-ENTMCNC: 34 G/DL (ref 31.5–35.7)
MCV RBC AUTO: 95.9 FL (ref 79–97)
MONOCYTES # BLD AUTO: 0.33 10*3/MM3 (ref 0.1–0.9)
MONOCYTES NFR BLD AUTO: 10.4 % (ref 5–12)
NEUTROPHILS NFR BLD AUTO: 1.31 10*3/MM3 (ref 1.7–7)
NEUTROPHILS NFR BLD AUTO: 41.5 % (ref 42.7–76)
NRBC BLD AUTO-RTO: 0 /100 WBC (ref 0–0.2)
PLATELET # BLD AUTO: 198 10*3/MM3 (ref 140–450)
PMV BLD AUTO: 10.8 FL (ref 6–12)
POTASSIUM SERPL-SCNC: 4.4 MMOL/L (ref 3.5–5.2)
PROT SERPL-MCNC: 6.4 G/DL (ref 6–8.5)
RBC # BLD AUTO: 3.93 10*6/MM3 (ref 3.77–5.28)
SODIUM SERPL-SCNC: 143 MMOL/L (ref 136–145)
TRIGL SERPL-MCNC: 36 MG/DL (ref 0–150)
TSH SERPL DL<=0.05 MIU/L-ACNC: 3.11 UIU/ML (ref 0.27–4.2)
VLDLC SERPL-MCNC: 7 MG/DL (ref 5–40)
WBC NRBC COR # BLD: 3.16 10*3/MM3 (ref 3.4–10.8)

## 2023-05-10 PROCEDURE — 80061 LIPID PANEL: CPT | Performed by: FAMILY MEDICINE

## 2023-05-10 PROCEDURE — 85025 COMPLETE CBC W/AUTO DIFF WBC: CPT | Performed by: FAMILY MEDICINE

## 2023-05-10 PROCEDURE — 80053 COMPREHEN METABOLIC PANEL: CPT | Performed by: FAMILY MEDICINE

## 2023-05-10 PROCEDURE — 36415 COLL VENOUS BLD VENIPUNCTURE: CPT | Performed by: FAMILY MEDICINE

## 2023-05-10 PROCEDURE — 84443 ASSAY THYROID STIM HORMONE: CPT | Performed by: FAMILY MEDICINE

## 2023-05-12 DIAGNOSIS — E78.00 PURE HYPERCHOLESTEROLEMIA: ICD-10-CM

## 2023-05-12 RX ORDER — ATORVASTATIN CALCIUM 10 MG/1
TABLET, FILM COATED ORAL
Qty: 90 TABLET | Refills: 1 | Status: SHIPPED | OUTPATIENT
Start: 2023-05-12

## 2023-05-22 ENCOUNTER — PATIENT MESSAGE (OUTPATIENT)
Dept: INTERNAL MEDICINE | Facility: CLINIC | Age: 68
End: 2023-05-22
Payer: MEDICARE

## 2023-05-22 DIAGNOSIS — E53.8 B12 DEFICIENCY: Primary | ICD-10-CM

## 2023-05-22 NOTE — TELEPHONE ENCOUNTER
From: Kaitlynn Le  To: Adela Valdez  Sent: 5/22/2023 10:06 AM EDT  Subject: Vitamin B-12 Question    Dr. Valdez,  I am experiencing weakness in my arms and legs and wanted to see what my Vitamin B-12 results are in the recent bloodwork I had done, but I don't see that reflected in the results. Please advise.    --Kaitlynn Le ([birth year])

## 2023-05-30 ENCOUNTER — APPOINTMENT (OUTPATIENT)
Dept: GENERAL RADIOLOGY | Facility: HOSPITAL | Age: 68
End: 2023-05-30

## 2023-05-30 ENCOUNTER — APPOINTMENT (OUTPATIENT)
Dept: MRI IMAGING | Facility: HOSPITAL | Age: 68
End: 2023-05-30

## 2023-05-30 ENCOUNTER — TELEPHONE (OUTPATIENT)
Dept: INTERNAL MEDICINE | Facility: CLINIC | Age: 68
End: 2023-05-30

## 2023-05-30 ENCOUNTER — HOSPITAL ENCOUNTER (EMERGENCY)
Facility: HOSPITAL | Age: 68
Discharge: HOME | End: 2023-05-30
Attending: EMERGENCY MEDICINE | Admitting: EMERGENCY MEDICINE

## 2023-05-30 ENCOUNTER — NURSE TRIAGE (OUTPATIENT)
Dept: CALL CENTER | Facility: HOSPITAL | Age: 68
End: 2023-05-30

## 2023-05-30 VITALS
BODY MASS INDEX: 22.76 KG/M2 | RESPIRATION RATE: 16 BRPM | HEART RATE: 103 BPM | HEIGHT: 67 IN | DIASTOLIC BLOOD PRESSURE: 96 MMHG | TEMPERATURE: 98.1 F | WEIGHT: 145 LBS | OXYGEN SATURATION: 98 % | SYSTOLIC BLOOD PRESSURE: 177 MMHG

## 2023-05-30 DIAGNOSIS — M50.122 DISORDER OF INTERVERTEBRAL DISC AT C5-C6 LEVEL WITH RADICULOPATHY: Primary | ICD-10-CM

## 2023-05-30 DIAGNOSIS — E04.1 THYROID NODULE: ICD-10-CM

## 2023-05-30 LAB
ALBUMIN SERPL-MCNC: 4.4 G/DL (ref 3.5–5.2)
ALBUMIN/GLOB SERPL: 1.6 G/DL
ALP SERPL-CCNC: 82 U/L (ref 39–117)
ALT SERPL W P-5'-P-CCNC: 21 U/L (ref 1–33)
ANION GAP SERPL CALCULATED.3IONS-SCNC: 9 MMOL/L (ref 5–15)
AST SERPL-CCNC: 23 U/L (ref 1–32)
BASOPHILS # BLD AUTO: 0.02 10*3/MM3 (ref 0–0.2)
BASOPHILS NFR BLD AUTO: 0.4 % (ref 0–1.5)
BILIRUB SERPL-MCNC: 0.3 MG/DL (ref 0–1.2)
BUN SERPL-MCNC: 16 MG/DL (ref 8–23)
BUN/CREAT SERPL: 19.8 (ref 7–25)
CALCIUM SPEC-SCNC: 9.2 MG/DL (ref 8.6–10.5)
CHLORIDE SERPL-SCNC: 104 MMOL/L (ref 98–107)
CO2 SERPL-SCNC: 27 MMOL/L (ref 22–29)
CREAT SERPL-MCNC: 0.81 MG/DL (ref 0.57–1)
DEPRECATED RDW RBC AUTO: 44.7 FL (ref 37–54)
EGFRCR SERPLBLD CKD-EPI 2021: 79.7 ML/MIN/1.73
EOSINOPHIL # BLD AUTO: 0.05 10*3/MM3 (ref 0–0.4)
EOSINOPHIL NFR BLD AUTO: 1 % (ref 0.3–6.2)
ERYTHROCYTE [DISTWIDTH] IN BLOOD BY AUTOMATED COUNT: 12.5 % (ref 12.3–15.4)
GEN 5 2HR TROPONIN T REFLEX: 17 NG/L
GLOBULIN UR ELPH-MCNC: 2.7 GM/DL
GLUCOSE SERPL-MCNC: 102 MG/DL (ref 65–99)
HCT VFR BLD AUTO: 40.6 % (ref 34–46.6)
HGB BLD-MCNC: 13.1 G/DL (ref 12–15.9)
HOLD SPECIMEN: NORMAL
IMM GRANULOCYTES # BLD AUTO: 0.01 10*3/MM3 (ref 0–0.05)
IMM GRANULOCYTES NFR BLD AUTO: 0.2 % (ref 0–0.5)
LIPASE SERPL-CCNC: 28 U/L (ref 13–60)
LYMPHOCYTES # BLD AUTO: 1.6 10*3/MM3 (ref 0.7–3.1)
LYMPHOCYTES NFR BLD AUTO: 33.3 % (ref 19.6–45.3)
MCH RBC QN AUTO: 31.3 PG (ref 26.6–33)
MCHC RBC AUTO-ENTMCNC: 32.3 G/DL (ref 31.5–35.7)
MCV RBC AUTO: 96.9 FL (ref 79–97)
MONOCYTES # BLD AUTO: 0.42 10*3/MM3 (ref 0.1–0.9)
MONOCYTES NFR BLD AUTO: 8.8 % (ref 5–12)
NEUTROPHILS NFR BLD AUTO: 2.7 10*3/MM3 (ref 1.7–7)
NEUTROPHILS NFR BLD AUTO: 56.3 % (ref 42.7–76)
NRBC BLD AUTO-RTO: 0 /100 WBC (ref 0–0.2)
NT-PROBNP SERPL-MCNC: 213.2 PG/ML (ref 0–900)
PLATELET # BLD AUTO: 211 10*3/MM3 (ref 140–450)
PMV BLD AUTO: 9.9 FL (ref 6–12)
POTASSIUM SERPL-SCNC: 4.1 MMOL/L (ref 3.5–5.2)
PROT SERPL-MCNC: 7.1 G/DL (ref 6–8.5)
QT INTERVAL: 376 MS
QT INTERVAL: 398 MS
QTC INTERVAL: 454 MS
QTC INTERVAL: 456 MS
RBC # BLD AUTO: 4.19 10*6/MM3 (ref 3.77–5.28)
SODIUM SERPL-SCNC: 140 MMOL/L (ref 136–145)
TROPONIN T DELTA: 2 NG/L
TROPONIN T SERPL HS-MCNC: 15 NG/L
WBC NRBC COR # BLD: 4.8 10*3/MM3 (ref 3.4–10.8)
WHOLE BLOOD HOLD COAG: NORMAL
WHOLE BLOOD HOLD SPECIMEN: NORMAL

## 2023-05-30 PROCEDURE — 84484 ASSAY OF TROPONIN QUANT: CPT | Performed by: EMERGENCY MEDICINE

## 2023-05-30 PROCEDURE — 80053 COMPREHEN METABOLIC PANEL: CPT | Performed by: EMERGENCY MEDICINE

## 2023-05-30 PROCEDURE — 83880 ASSAY OF NATRIURETIC PEPTIDE: CPT | Performed by: EMERGENCY MEDICINE

## 2023-05-30 PROCEDURE — 72141 MRI NECK SPINE W/O DYE: CPT

## 2023-05-30 PROCEDURE — 99283 EMERGENCY DEPT VISIT LOW MDM: CPT

## 2023-05-30 PROCEDURE — 70551 MRI BRAIN STEM W/O DYE: CPT

## 2023-05-30 PROCEDURE — 36415 COLL VENOUS BLD VENIPUNCTURE: CPT

## 2023-05-30 PROCEDURE — 85025 COMPLETE CBC W/AUTO DIFF WBC: CPT | Performed by: EMERGENCY MEDICINE

## 2023-05-30 PROCEDURE — 71045 X-RAY EXAM CHEST 1 VIEW: CPT

## 2023-05-30 PROCEDURE — 93005 ELECTROCARDIOGRAM TRACING: CPT | Performed by: EMERGENCY MEDICINE

## 2023-05-30 PROCEDURE — 83690 ASSAY OF LIPASE: CPT | Performed by: EMERGENCY MEDICINE

## 2023-05-30 RX ORDER — PREDNISONE 20 MG/1
TABLET ORAL
Qty: 18 TABLET | Refills: 0 | Status: SHIPPED | OUTPATIENT
Start: 2023-05-30

## 2023-05-30 RX ORDER — ASPIRIN 81 MG/1
324 TABLET, CHEWABLE ORAL ONCE
Status: DISCONTINUED | OUTPATIENT
Start: 2023-05-30 | End: 2023-05-30

## 2023-05-30 RX ORDER — SODIUM CHLORIDE 0.9 % (FLUSH) 0.9 %
10 SYRINGE (ML) INJECTION AS NEEDED
Status: DISCONTINUED | OUTPATIENT
Start: 2023-05-30 | End: 2023-05-30 | Stop reason: HOSPADM

## 2023-05-30 NOTE — TELEPHONE ENCOUNTER
"HUB call - caller with weakness in arms, unable to reach provider. ([id number])    Spoke with caller, who reports \"heaviness in bilateral upper arms from shoulders to elbows. Denies chest pain, soa, no skipped beats. Denies history of lung or cardiac disease. States has been talking with Dr. Valdez over last 2-3 weeks regarding B12 supplements which is when she noticed heaviness in legs that resolved and now in arms.     Guidelines followed, protocols reviewed. This RN contacted Lluvia at Dr. Valdez's office who states patient does have heart history and will speak to her about going to ER. Call transferred thru.    Reason for Disposition   MODERATE weakness (i.e., interferes with work, school, normal activities) and cause unknown  (Exceptions: Weakness with acute minor illness, or weakness from poor fluid intake.)    Additional Information   Negative: SEVERE difficulty breathing (e.g., struggling for each breath, speaks in single words)   Negative: Shock suspected (e.g., cold/pale/clammy skin, too weak to stand, low BP, rapid pulse)   Negative: Difficult to awaken or acting confused (e.g., disoriented, slurred speech)   Negative: Fainted > 15 minutes ago and still feels too weak or dizzy to stand   Negative: SEVERE weakness (i.e., unable to walk or barely able to walk, requires support) and new-onset or worsening   Negative: Sounds like a life-threatening emergency to the triager   Negative: Weakness of the face, arm or leg on one side of the body   Negative: Has diabetes mellitus and weakness from low blood sugar (i.e., < 60 mg/dL or 3.5 mmol/L)   Negative: Recent heat exposure, suspected cause of weakness   Negative: Vomiting is main symptom   Negative: Diarrhea is main symptom   Negative: Difficulty breathing   Negative: Heart beating < 50 beats per minute OR > 140 beats per minute   Negative: Extra heartbeats OR irregular heart beating (i.e., 'palpitations')   Negative: Follows bleeding (e.g., from vomiting, " "rectum, vagina)  (Exception: Small transient weakness from sight of a small amount blood.)   Negative: Bloody, black, or tarry bowel movements  (Exception: Chronic-unchanged black-grey bowel movements and is taking iron pills or Pepto-Bismol.)   Negative: MODERATE weakness from poor fluid intake with no improvement after 2 hours of rest and fluids   Negative: Drinking very little and dehydration suspected (e.g., no urine > 12 hours, very dry mouth, very lightheaded)   Negative: Patient sounds very sick or weak to the triager   Negative: Fever > 103 F  (39.4 C) and not able to get the Fever down using CARE ADVICE    Answer Assessment - Initial Assessment Questions  1. DESCRIPTION: \"Describe how you are feeling.\"      Heaviness in bilateral arms from shoulder to elbow  2. SEVERITY: \"How bad is it?\"  \"Can you stand and walk?\"    - MILD - Feels weak or tired, but does not interfere with work, school or normal activities    - MODERATE - Able to stand and walk; weakness interferes with work, school, or normal activities    - SEVERE - Unable to stand or walk      Moderate  3. ONSET:  \"When did the weakness begin?\"      Started 2-3 weeks ago  4. CAUSE: \"What do you think is causing the weakness?\"      Unknown  5. MEDICINES: \"Have you recently started a new medicine or had a change in the amount of a medicine?\"     B12  6. OTHER SYMPTOMS: \"Do you have any other symptoms?\" (e.g., chest pain, fever, cough, SOB, vomiting, diarrhea, bleeding, other areas of pain)      Denies all of the above  7. PREGNANCY: \"Is there any chance you are pregnant?\" \"When was your last menstrual period?\"      No    Protocols used: Weakness (Generalized) and Fatigue-ADULT-OH    "

## 2023-05-30 NOTE — TELEPHONE ENCOUNTER
Received a nurse triage call from the hub stating that patient was complaining of shoulder to elbow weakness that has not improved. Patient thought this may have been caused by the vitamin B 12 but has not shown any improvement. Patient describes as a heavy and weak feeling. An appointment has been made for June 1 and patient was urged to go to the ER as she is a cardiac patient. Patient felt the appointment would be enough. However, due to myself not being clinical I have told patient I will ask a clinical staff member to call her back and discuss further.

## 2023-05-30 NOTE — ED PROVIDER NOTES
Subjective   History of Present Illness    Weakness and heaviness both upper arms for a few weeks.  Denies distal weakness or loss of dexterity in her hands.  Today the heaviness sensation started going into neck so she came in.  Denies speech or vision symptoms.  No headaches.  No swallowing changes.  No chest pain or dyspnea.    She had some heaviness in her legs a few weeks ago but started taking B12 and symptoms resolved quickly and have not returned.    PMH HL, GERD, breast cancer (remote)    History provided by:  Patient      Review of Systems   Constitutional: Negative for fever.   HENT: Negative for trouble swallowing.    Eyes: Negative for visual disturbance.   Respiratory: Negative for shortness of breath.    Cardiovascular: Negative for chest pain.   Neurological: Positive for weakness. Negative for speech difficulty and numbness.   All other systems reviewed and are negative.      Past Medical History:   Diagnosis Date   • Cancer     breast   • Elevated cholesterol    • GERD (gastroesophageal reflux disease) 03/12/2019   • Heart murmur June 2022   • History of radiation therapy    • Hyperlipidemia    • Kidney stones 04/13/2017    bilateral   • Osteoarthritis of knee    • Osteopenia of multiple sites 09/19/2018   • Osteoporosis    • Pes planus, flexible    • Sleep apnea    • Tibialis posterior tendinitis    • Urinary tract infection 07/2017   • Varicose veins of legs    • Vertigo        Allergies   Allergen Reactions   • Caffeine Other (See Comments)     Headaches, speeds up heart rate, gets acne with chocolate, keeps her from sleeping   • Ibuprofen GI Intolerance   • Adhesive Tape Rash     Paper tape       Past Surgical History:   Procedure Laterality Date   • BREAST RECONSTRUCTION Bilateral 2014   • COLONOSCOPY  [id number]   • COLONOSCOPY  05/04/2021    Dr Manning   • CYSTOSCOPY  04/13/2017    right ureteral stone Dr Lofton   • CYSTOSCOPY  12/07/2009   • CYSTOSCOPY INSERTION / REMOVAL STENT / STONE   05/01/2009    calcium oxxylate mono   • EXTRACORPOREAL SHOCK WAVE LITHOTRIPSY (ESWL) Left 03/2009   • EXTRACORPOREAL SHOCK WAVE LITHOTRIPSY (ESWL) Bilateral 03/19/2021    Procedure: EXTRACORPOREAL SHOCKWAVE LITHOTRIPSY BILATERAL;  Surgeon: Jayy Lofton Jr., MD;  Location: [facility] to [facility];  Service: Urology;  Laterality: Bilateral;   • HERNIA REPAIR     • KNEE ARTHROSCOPY W/ MENISCAL REPAIR Right 2011   • MASTECTOMY MODIFIED RADICAL / SIMPLE / COMPLETE Left 2013    breast cancer   • SIMPLE MASTECTOMY Right 2013    hx breast Ca left   • SKIN BIOPSY  07/2017    mid thoracic back Dr Salinas   • TONSILLECTOMY  09/2015   • UPPER GASTROINTESTINAL ENDOSCOPY  06/20/2022    [facility]   • WISDOM TOOTH EXTRACTION  2010       Family History   Problem Relation Age of Onset   • Breast cancer Mother    • Mitral valve prolapse Mother    • Heart failure Mother    • Heart disease Mother         Congestive heart failure   • Cancer Mother         Breast   • Mitral valve prolapse Father    • Heart disease Father         Mitral valve replacement  1984   • Valvular heart disease Father         mitral valve replaced   • Prostate cancer Father    • Cancer Father         Prostate   • Migraines Sister    • Mitral valve prolapse Sister    • Valvular heart disease Sister         mitral valve replaced   • Prostate cancer Brother    • Mitral valve prolapse Brother    • Heart disease Brother         Mitral valve replacement  2005   • Valvular heart disease Brother         mitral valve replaced   • Cancer Maternal Grandmother         Liver   • Heart attack Maternal Grandfather    • Mental illness Paternal Grandmother    • No Known Problems Paternal Grandfather    • Cancer Other    • Heart disease Sister         Mitral valve replacement  2020   • Colon cancer Neg Hx    • Colon polyps Neg Hx        Social History     Socioeconomic History   • Marital status:    Tobacco Use   • Smoking status: Never   • Smokeless tobacco: Never   Vaping Use    • Vaping Use: Never used   Substance and Sexual Activity   • Alcohol use: No   • Drug use: No   • Sexual activity: Yes     Partners: Male     Birth control/protection: Post-menopausal     Comment:            Objective   Physical Exam  Vitals and nursing note reviewed.   Constitutional:       General: She is not in acute distress.     Appearance: Normal appearance. She is not ill-appearing.   HENT:      Head: Normocephalic and atraumatic.      Mouth/Throat:      Mouth: Mucous membranes are moist.   Eyes:      General: No scleral icterus.        Right eye: No discharge.         Left eye: No discharge.      Extraocular Movements: Extraocular movements intact.      Conjunctiva/sclera: Conjunctivae normal.   Cardiovascular:      Rate and Rhythm: Normal rate and regular rhythm.      Heart sounds: No murmur heard.  Pulmonary:      Effort: Pulmonary effort is normal. No respiratory distress.      Breath sounds: Normal breath sounds. No wheezing.   Abdominal:      General: Bowel sounds are normal. There is no distension.      Palpations: Abdomen is soft.      Tenderness: There is no abdominal tenderness. There is no guarding or rebound.   Musculoskeletal:         General: No swelling. Normal range of motion.      Cervical back: Normal range of motion and neck supple.   Skin:     General: Skin is warm and dry.      Findings: No rash.   Neurological:      General: No focal deficit present.      Mental Status: She is alert. Mental status is at baseline.      Comments: Speech fluent and articulate.  No facial droop.  5/5 strength in arms and legs.  Normal .  Mentation normal.     Psychiatric:         Mood and Affect: Mood normal.         Behavior: Behavior normal.         Thought Content: Thought content normal.         Procedures           ED Course          EKG NSR, 1st degree block. Labs benign.  CXR NAD.    MRI brain negative for acute process.  MRI C-spine shows thyroid nodule and disk disease.    Patient  stable on serial rechecks.  Discussed findings, concerns, plan of care, expected course, reasons to return and followup.  Provided the opportunity to ask questions.  Will treat for herniated disk and if she fails to improve she needs continued outpatient workup.    Thyroid nodule discussed with pt, need outpatient workup.                                    Medical Decision Making  ddx includes ALS, brain tumor, demyelinating disease, anginal equivalent, vitamin deficiency    Disorder of intervertebral disc at C5-C6 level with radiculopathy: acute illness or injury  Thyroid nodule: undiagnosed new problem with uncertain prognosis  Amount and/or Complexity of Data Reviewed  Labs: ordered. Decision-making details documented in ED Course.  Radiology: ordered and independent interpretation performed. Decision-making details documented in ED Course.     Details: my read of MRI C-spine: disk herniation at C5-6 causing thecal sac compression  ECG/medicine tests: ordered and independent interpretation performed. Decision-making details documented in ED Course.     Details: NSR with 1st degree AV block, my interpretation      Risk  OTC drugs.  Prescription drug management.          Final diagnoses:   Disorder of intervertebral disc at C5-C6 level with radiculopathy   Thyroid nodule       ED Disposition  ED Disposition     ED Disposition   Discharge    Condition   Stable    Comment   --             Adela Valdez MD  [address]  [address]  [place] [address]  [phone]    In 1 week           Medication List      New Prescriptions    predniSONE 20 MG tablet  Commonly known as: DELTASONE  3 tabs PO QD x 3d, then 2 tabs PO QD x 3d, then 1 tab PO QD x 3d           Where to Get Your Medications      These medications were sent to [facility] [id number] - [place], [place] - [address] AT [facility] - [phone]  - [phone] FX  [address], [facility] [address]    Phone: [phone]   · predniSONE  20 MG tablet          Francisco Torres MD  05/31/23 2331

## 2023-05-30 NOTE — TELEPHONE ENCOUNTER
Called and spoke with patient, informed her of Fara's recommendations. She verbalized understanding and will proceed to the ER.

## 2023-05-31 NOTE — DISCHARGE INSTRUCTIONS
Evidence for a large nodule or cyst involving the right lobe of the thyroid measuring up to 2.7 cm. Recommend correlation with follow-up nonemergent outpatient ultrasound of the thyroid gland for better characterization

## 2023-06-15 ENCOUNTER — OFFICE VISIT (OUTPATIENT)
Dept: INTERNAL MEDICINE | Facility: CLINIC | Age: 68
End: 2023-06-15
Payer: MEDICARE

## 2023-06-15 VITALS
WEIGHT: 150 LBS | SYSTOLIC BLOOD PRESSURE: 122 MMHG | OXYGEN SATURATION: 98 % | BODY MASS INDEX: 23.54 KG/M2 | HEART RATE: 107 BPM | TEMPERATURE: 98.2 F | HEIGHT: 67 IN | DIASTOLIC BLOOD PRESSURE: 62 MMHG

## 2023-06-15 DIAGNOSIS — Z90.13 STATUS POST BILATERAL MASTECTOMY: ICD-10-CM

## 2023-06-15 DIAGNOSIS — Z85.3 HISTORY OF CANCER OF LEFT BREAST: ICD-10-CM

## 2023-06-15 DIAGNOSIS — E78.2 MIXED HYPERLIPIDEMIA: ICD-10-CM

## 2023-06-15 DIAGNOSIS — M79.10 MYALGIA: Primary | ICD-10-CM

## 2023-06-15 DIAGNOSIS — E04.1 THYROID NODULE: ICD-10-CM

## 2023-06-15 DIAGNOSIS — M50.30 DDD (DEGENERATIVE DISC DISEASE), CERVICAL: ICD-10-CM

## 2023-06-15 DIAGNOSIS — R07.89 CHEST WALL TENDERNESS: ICD-10-CM

## 2023-06-15 NOTE — PROGRESS NOTES
"Subjective   Kaitlynn Le is a 67 y.o. female.     Chief Complaint   Patient presents with    Extremity Weakness     ER follow up on upper arm weakness.  Discuss atorvastatin       breast implants     Questions on follow up        Visit Vitals  /62 (BP Location: Right arm, Patient Position: Sitting, Cuff Size: Adult)   Pulse 107   Temp 98.2 °F (36.8 °C)   Ht 170.2 cm (67\")   Wt 68 kg (150 lb)   SpO2 98%   BMI 23.49 kg/m²         Extremity Weakness   This is a new problem. The current episode started in the past 7 days. There has been no history of extremity trauma. The problem occurs constantly. The problem has been rapidly improving. The pain is at a severity of 0/10. The patient is experiencing no pain. Associated symptoms include tingling. Pertinent negatives include no fever, inability to bear weight, itching, joint locking, joint swelling, limited range of motion, numbness or stiffness. The symptoms are aggravated by activity. Treatments tried: pt stopped the atorvastatin. The treatment provided moderate relief. Family history does not include gout or rheumatoid arthritis. There is no history of diabetes, gout, osteoarthritis or rheumatoid arthritis.    Pt having upper extremity weakness. Pt was exhausted after 1 hour yard work-trimming bushes with electric tal and a rake.     Pt thinks the atorvastatin is causing the muscle aches and weakness. Pt stopped atorvastatin Sunday. Pt is feeling better. Pt ha been on atorvastatin about 7 months.     Pt is feeling a pinch on the left ant axillary line where her implant sits.  Pt had lymph nodes removed from that area with modified radical mastectomy.     Pt is not having neck pain or radiculopathy symptoms.   The following portions of the patient's history were reviewed and updated as appropriate: allergies, current medications, past family history, past medical history, past social history, past surgical history, and problem list.    Past Medical " History:   Diagnosis Date    Cancer     breast    Elevated cholesterol     GERD (gastroesophageal reflux disease) 03/12/2019    Heart murmur June 2022    History of radiation therapy     Hyperlipidemia     Kidney stones 04/13/2017    bilateral    Osteoarthritis of knee     Osteopenia of multiple sites 09/19/2018    Osteoporosis     Pes planus, flexible     Sleep apnea     Tibialis posterior tendinitis     Urinary tract infection 07/2017    Varicose veins of legs     Vertigo       Past Surgical History:   Procedure Laterality Date    BREAST RECONSTRUCTION Bilateral 2014    COLONOSCOPY  2/252016    COLONOSCOPY  05/04/2021    Dr Manning    CYSTOSCOPY  04/13/2017    right ureteral stone Dr Lofton    CYSTOSCOPY  12/07/2009    CYSTOSCOPY INSERTION / REMOVAL STENT / STONE  05/01/2009    calcium oxxylate mono    EXTRACORPOREAL SHOCK WAVE LITHOTRIPSY (ESWL) Left 03/2009    EXTRACORPOREAL SHOCK WAVE LITHOTRIPSY (ESWL) Bilateral 03/19/2021    Procedure: EXTRACORPOREAL SHOCKWAVE LITHOTRIPSY BILATERAL;  Surgeon: Jayy Lofton Jr., MD;  Location: [facility];  Service: Urology;  Laterality: Bilateral;    HERNIA REPAIR      KNEE ARTHROSCOPY W/ MENISCAL REPAIR Right 2011    MASTECTOMY MODIFIED RADICAL / SIMPLE / COMPLETE Left 2013    breast cancer    SIMPLE MASTECTOMY Right 2013    hx breast Ca left    SKIN BIOPSY  07/2017    mid thoracic back Dr Salinas    TONSILLECTOMY  09/2015    UPPER GASTROINTESTINAL ENDOSCOPY  06/20/2022    [facility]    WISDOM TOOTH EXTRACTION  2010      Family History   Problem Relation Age of Onset    Breast cancer Mother     Mitral valve prolapse Mother     Heart failure Mother     Heart disease Mother         Congestive heart failure    Cancer Mother         Breast    Mitral valve prolapse Father     Heart disease Father         Mitral valve replacement  1984    Valvular heart disease Father         mitral valve replaced    Prostate cancer Father     Cancer Father         Prostate     Migraines Sister     Mitral valve prolapse Sister     Valvular heart disease Sister         mitral valve replaced    Prostate cancer Brother     Mitral valve prolapse Brother     Heart disease Brother         Mitral valve replacement  2005    Valvular heart disease Brother         mitral valve replaced    Cancer Maternal Grandmother         Liver    Heart attack Maternal Grandfather     Mental illness Paternal Grandmother     No Known Problems Paternal Grandfather     Cancer Other     Heart disease Sister         Mitral valve replacement  2020    Colon cancer Neg Hx     Colon polyps Neg Hx       Social History     Socioeconomic History    Marital status:    Tobacco Use    Smoking status: Never    Smokeless tobacco: Never   Vaping Use    Vaping Use: Never used   Substance and Sexual Activity    Alcohol use: No    Drug use: No    Sexual activity: Yes     Partners: Male     Birth control/protection: Post-menopausal     Comment:       Allergies   Allergen Reactions    Caffeine Other (See Comments)     Headaches, speeds up heart rate, gets acne with chocolate, keeps her from sleeping    Ibuprofen GI Intolerance    Adhesive Tape Rash     Paper tape       Review of Systems   Constitutional:  Negative for fever.   Musculoskeletal:  Positive for extremity weakness. Negative for gout and stiffness.   Skin:  Negative for itching.   Neurological:  Positive for tingling. Negative for numbness.     Objective   Physical Exam  Vitals and nursing note reviewed.   Constitutional:       Appearance: She is well-developed.   HENT:      Head: Normocephalic.      Right Ear: External ear normal.      Left Ear: External ear normal.      Nose: Nose normal.   Eyes:      General: Lids are normal.      Conjunctiva/sclera: Conjunctivae normal.      Pupils: Pupils are equal, round, and reactive to light.   Neck:      Thyroid: No thyroid mass or thyromegaly.      Vascular: No carotid bruit.      Trachea: Trachea normal.    Cardiovascular:      Rate and Rhythm: Normal rate and regular rhythm.      Heart sounds: No murmur heard.  Pulmonary:      Effort: Pulmonary effort is normal. No respiratory distress.      Breath sounds: Normal breath sounds. No decreased breath sounds, wheezing, rhonchi or rales.   Chest:      Chest wall: No tenderness.   Breasts:     Right: Absent.      Left: Absent. Tenderness present.      Comments: Pt has bilateral implants, s/p bilateral mastectomy. Slight tenderness anterior axillary line at lateral edge of implant.   Abdominal:      General: Bowel sounds are normal.      Palpations: Abdomen is soft.      Tenderness: There is no abdominal tenderness.   Musculoskeletal:         General: Normal range of motion.      Cervical back: Normal range of motion and neck supple.   Lymphadenopathy:      Upper Body:      Right upper body: No supraclavicular, axillary or pectoral adenopathy.      Left upper body: No supraclavicular, axillary or pectoral adenopathy.   Skin:     General: Skin is warm and dry.   Neurological:      Mental Status: She is alert and oriented to person, place, and time.   Psychiatric:         Behavior: Behavior normal.       Assessment & Plan   Problems Addressed this Visit          Cardiac and Vasculature    Hyperlipidemia       Hematology and Neoplasia    History of cancer of left breast    Relevant Orders    US Chest       Neuro    DDD (degenerative disc disease), cervical     Other Visit Diagnoses       Myalgia    -  Primary    Relevant Orders    CK    Thyroid nodule        Relevant Orders    US Thyroid    Chest wall tenderness        Relevant Orders    US Chest    Status post bilateral mastectomy        Relevant Orders    US Chest          Diagnoses         Codes Comments    Myalgia    -  Primary ICD-10-CM: M79.10  ICD-9-CM: 729.1     Mixed hyperlipidemia     ICD-10-CM: E78.2  ICD-9-CM: 272.2     Thyroid nodule     ICD-10-CM: E04.1  ICD-9-CM: 241.0     DDD (degenerative disc disease),  cervical     ICD-10-CM: M50.30  ICD-9-CM: 722.4     Chest wall tenderness     ICD-10-CM: R07.89  ICD-9-CM: 786.52     History of cancer of left breast     ICD-10-CM: Z85.3  ICD-9-CM: V10.3     Status post bilateral mastectomy     ICD-10-CM: Z90.13  ICD-9-CM: V45.71                        Current Outpatient Medications:     Calcium Citrate-Vitamin D (CALCIUM CITRATE + D PO), Take 2 tablets by mouth 2 (Two) Times a Day., Disp: , Rfl:     metroNIDAZOLE (METROCREAM) 0.75 % cream, Apply 1 application topically to the appropriate area as directed As Needed., Disp: , Rfl:     nystatin (MYCOSTATIN) 098426 UNIT/GM cream, Apply 1 application topically to the appropriate area as directed As Needed., Disp: , Rfl:     Probiotic Product (Align) capsule, Take 1 capsule by mouth Daily., Disp: , Rfl:     traZODone (DESYREL) 150 MG tablet, Take 1 tablet by mouth Every Night., Disp: 90 tablet, Rfl: 1    vitamin B-12 (CYANOCOBALAMIN) 500 MCG tablet, Take 1 tablet by mouth 3 (Three) Times a Week., Disp: , Rfl:     Return in about 4 months (around 10/6/2023) for Recheck, Next scheduled follow up.      Narrative & Impression      MRI CERVICAL SPINE WO CONTRAST     Date of Exam: 5/30/2023 6:28 PM EDT     Indication: progressive bilateral proximal arm weakness.     Comparison: None available.     Technique:  Routine multiplanar/multisequence sequence images of the cervical spine were obtained without contrast administration.       FINDINGS:  The cervical vertebral body alignment is within normal limits. No focal abnormal bone marrow signal is seen. There is no evidence for abnormal edema or stress-related changes. Age-related changes of the discs are noted. There is no abnormal signal   throughout the cervical cord.     The C2/3 level demonstrates no significant central canal narrowing or neural foraminal narrowing.     The C3/4 level demonstrates no evidence for significant posterior disc bulge or disc protrusion/extrusion. There are mild  degenerative uncovertebral changes. Mild hypertrophic posterior facet changes are seen. There is no significant central canal   narrowing. Mild bilateral neural foraminal narrowing is observed.     The C4/5 level demonstrates evidence for a small posterior disc bulge which results in mild impression on the ventral thecal sac and encroachment on the lateral recesses. There are mild degenerative uncovertebral changes. Mild to moderate hypertrophic   posterior facet changes are seen. There is mild central canal narrowing. Mild left neural foraminal narrowing is observed. Moderate right neural foraminal narrowing is noted.     The C5/6 level demonstrates evidence for a moderate posterior disc osteophyte complex which results in moderate impression on the ventral thecal sac and encroachment on the lateral recesses. There is mild flattening of the ventral cervical cord. Moderate   degenerative uncovertebral joint changes are seen. Mild hypertrophic posterior facet changes are noted. There is moderate central canal narrowing. Moderate bilateral neural foraminal narrowing is noted.     The C6/7 level demonstrates evidence for a small left of center posterior disc protrusion/extrusion measuring 2 mm in AP dimension. This finding results in mild impression on the ventral thecal sac and encroachment on the left lateral recess. Mild   degenerative uncovertebral joint changes are seen. Mild hypertrophic posterior facet changes are noted. There is mild central canal narrowing. Mild bilateral neural foraminal narrowing is noted.     The C7/T1 level demonstrates no significant central canal narrowing or neural foraminal narrowing.     There is a prominent fluid signal focus noted involving the left lobe of the thyroid. The findings suggest a large nodule or cyst measuring 2.7 x 1.5 cm in craniocaudal by transverse dimensions.     IMPRESSION:  1.No abnormal signal is observed throughout the cervical cord.  2.Mild to moderate  multilevel cervical spondylosis. These findings contribute to central canal narrowing and neural foraminal narrowing at multiple levels.  3.There is a small left of center posterior disc protrusion/extrusion measuring 2 mm in AP dimension at the C6/7 level.  4.Mild to moderate degenerative uncovertebral joint changes and hypertrophic posterior facet changes. These findings contribute to neural foraminal narrowing at multiple levels bilaterally.  5.Evidence for a large nodule or cyst involving the right lobe of the thyroid measuring up to 2.7 cm. Recommend correlation with follow-up nonemergent outpatient ultrasound of the thyroid gland for better characterization.     Electronically Signed: Juancarlos Pinto    5/30/2023 8:20 PM EDT    Workstation ID: [id number]       Narrative & Impression   MRI BRAIN WO CONTRAST     Date of Exam: 5/30/2023 6:11 PM EDT     Indication: progressive bilateral proximal arm weakness.     Comparison: 6/16/2012.     Technique:  Routine multiplanar/multisequence sequence images of the brain were obtained without contrast administration.        Findings:  No acute infarct is present on diffusion weighted sequences. Midline structures are normal and the craniocervical junction appears satisfactory. Mild stable age-related changes are noted from comparison, including some generalized volume loss and few   nonspecific areas of white matter T2 hyperintensity, likely to reflect typical chronic microvascular ischemia. There is otherwise no evidence of intracranial hemorrhage, mass or mass effect. The ventricles are normal in size and configuration. The orbits   are normal. The paranasal sinuses are grossly clear. Intracranial arterial flow voids are maintained. There is nonspecific moderate right mastoid effusion noted.     IMPRESSION:  Impression:  Stable mild chronic and age-related changes. No evidence of acute ischemia, hemorrhage, mass or mass effect.     Incidentally noted new moderate  right-sided mastoid effusion. Correlate with any clinical concern for mastoiditis.           Electronically Signed: John Falcon    5/30/2023 6:51 PM EDT    Workstation ID: [id number]     Recent Results (from the past 1344 hour(s))   Comprehensive Metabolic Panel    Collection Time: 05/10/23  8:35 AM    Specimen: Arm, Right; Blood   Result Value Ref Range    Glucose 91 65 - 99 mg/dL    BUN 17 8 - 23 mg/dL    Creatinine 0.92 0.57 - 1.00 mg/dL    Sodium 143 136 - 145 mmol/L    Potassium 4.4 3.5 - 5.2 mmol/L    Chloride 106 98 - 107 mmol/L    CO2 27.4 22.0 - 29.0 mmol/L    Calcium 9.4 8.6 - 10.5 mg/dL    Total Protein 6.4 6.0 - 8.5 g/dL    Albumin 4.1 3.5 - 5.2 g/dL    ALT (SGPT) 28 1 - 33 U/L    AST (SGOT) 27 1 - 32 U/L    Alkaline Phosphatase 75 39 - 117 U/L    Total Bilirubin 0.4 0.0 - 1.2 mg/dL    Globulin 2.3 gm/dL    A/G Ratio 1.8 g/dL    BUN/Creatinine Ratio 18.5 7.0 - 25.0    Anion Gap 9.6 5.0 - 15.0 mmol/L    eGFR 68.4 >60.0 mL/min/1.73   TSH Rfx On Abnormal To Free T4    Collection Time: 05/10/23  8:35 AM    Specimen: Arm, Right; Blood   Result Value Ref Range    TSH 3.110 0.270 - 4.200 uIU/mL   Lipid Panel    Collection Time: 05/10/23  8:35 AM    Specimen: Arm, Right; Blood   Result Value Ref Range    Total Cholesterol 200 0 - 200 mg/dL    Triglycerides 36 0 - 150 mg/dL    HDL Cholesterol 96 (H) 40 - 60 mg/dL    LDL Cholesterol  97 0 - 100 mg/dL    VLDL Cholesterol 7 5 - 40 mg/dL    LDL/HDL Ratio 1.01    CBC Auto Differential    Collection Time: 05/10/23  8:35 AM    Specimen: Arm, Right; Blood   Result Value Ref Range    WBC 3.16 (L) 3.40 - 10.80 10*3/mm3    RBC 3.93 3.77 - 5.28 10*6/mm3    Hemoglobin 12.8 12.0 - 15.9 g/dL    Hematocrit 37.7 34.0 - 46.6 %    MCV 95.9 79.0 - 97.0 fL    MCH 32.6 26.6 - 33.0 pg    MCHC 34.0 31.5 - 35.7 g/dL    RDW 12.3 12.3 - 15.4 %    RDW-SD 43.8 37.0 - 54.0 fl    MPV 10.8 6.0 - 12.0 fL    Platelets 198 140 - 450 10*3/mm3    Neutrophil % 41.5 (L) 42.7 - 76.0 %    Lymphocyte %  44.0 19.6 - 45.3 %    Monocyte % 10.4 5.0 - 12.0 %    Eosinophil % 3.5 0.3 - 6.2 %    Basophil % 0.3 0.0 - 1.5 %    Immature Grans % 0.3 0.0 - 0.5 %    Neutrophils, Absolute 1.31 (L) 1.70 - 7.00 10*3/mm3    Lymphocytes, Absolute 1.39 0.70 - 3.10 10*3/mm3    Monocytes, Absolute 0.33 0.10 - 0.90 10*3/mm3    Eosinophils, Absolute 0.11 0.00 - 0.40 10*3/mm3    Basophils, Absolute 0.01 0.00 - 0.20 10*3/mm3    Immature Grans, Absolute 0.01 0.00 - 0.05 10*3/mm3    nRBC 0.0 0.0 - 0.2 /100 WBC   ECG 12 Lead ED Triage Standing Order; Chest Pain    Collection Time: 05/30/23  5:21 PM   Result Value Ref Range    QT Interval 376 ms    QTC Interval 454 ms   High Sensitivity Troponin T    Collection Time: 05/30/23  5:33 PM    Specimen: Blood   Result Value Ref Range    HS Troponin T 15 (H) <10 ng/L   Comprehensive Metabolic Panel    Collection Time: 05/30/23  5:33 PM    Specimen: Blood   Result Value Ref Range    Glucose 102 (H) 65 - 99 mg/dL    BUN 16 8 - 23 mg/dL    Creatinine 0.81 0.57 - 1.00 mg/dL    Sodium 140 136 - 145 mmol/L    Potassium 4.1 3.5 - 5.2 mmol/L    Chloride 104 98 - 107 mmol/L    CO2 27.0 22.0 - 29.0 mmol/L    Calcium 9.2 8.6 - 10.5 mg/dL    Total Protein 7.1 6.0 - 8.5 g/dL    Albumin 4.4 3.5 - 5.2 g/dL    ALT (SGPT) 21 1 - 33 U/L    AST (SGOT) 23 1 - 32 U/L    Alkaline Phosphatase 82 39 - 117 U/L    Total Bilirubin 0.3 0.0 - 1.2 mg/dL    Globulin 2.7 gm/dL    A/G Ratio 1.6 g/dL    BUN/Creatinine Ratio 19.8 7.0 - 25.0    Anion Gap 9.0 5.0 - 15.0 mmol/L    eGFR 79.7 >60.0 mL/min/1.73   Lipase    Collection Time: 05/30/23  5:33 PM    Specimen: Blood   Result Value Ref Range    Lipase 28 13 - 60 U/L   BNP    Collection Time: 05/30/23  5:33 PM    Specimen: Blood   Result Value Ref Range    proBNP 213.2 0.0 - 900.0 pg/mL   Green Top (Gel)    Collection Time: 05/30/23  5:33 PM   Result Value Ref Range    Extra Tube Hold for add-ons.    Lavender Top    Collection Time: 05/30/23  5:33 PM   Result Value Ref Range    Extra  Tube hold for add-on    Gold Top - SST    Collection Time: 05/30/23  5:33 PM   Result Value Ref Range    Extra Tube Hold for add-ons.    Gray Top    Collection Time: 05/30/23  5:33 PM   Result Value Ref Range    Extra Tube Hold for add-ons.    Light Blue Top    Collection Time: 05/30/23  5:33 PM   Result Value Ref Range    Extra Tube Hold for add-ons.    CBC Auto Differential    Collection Time: 05/30/23  5:33 PM    Specimen: Blood   Result Value Ref Range    WBC 4.80 3.40 - 10.80 10*3/mm3    RBC 4.19 3.77 - 5.28 10*6/mm3    Hemoglobin 13.1 12.0 - 15.9 g/dL    Hematocrit 40.6 34.0 - 46.6 %    MCV 96.9 79.0 - 97.0 fL    MCH 31.3 26.6 - 33.0 pg    MCHC 32.3 31.5 - 35.7 g/dL    RDW 12.5 12.3 - 15.4 %    RDW-SD 44.7 37.0 - 54.0 fl    MPV 9.9 6.0 - 12.0 fL    Platelets 211 140 - 450 10*3/mm3    Neutrophil % 56.3 42.7 - 76.0 %    Lymphocyte % 33.3 19.6 - 45.3 %    Monocyte % 8.8 5.0 - 12.0 %    Eosinophil % 1.0 0.3 - 6.2 %    Basophil % 0.4 0.0 - 1.5 %    Immature Grans % 0.2 0.0 - 0.5 %    Neutrophils, Absolute 2.70 1.70 - 7.00 10*3/mm3    Lymphocytes, Absolute 1.60 0.70 - 3.10 10*3/mm3    Monocytes, Absolute 0.42 0.10 - 0.90 10*3/mm3    Eosinophils, Absolute 0.05 0.00 - 0.40 10*3/mm3    Basophils, Absolute 0.02 0.00 - 0.20 10*3/mm3    Immature Grans, Absolute 0.01 0.00 - 0.05 10*3/mm3    nRBC 0.0 0.0 - 0.2 /100 WBC   ECG 12 Lead ED Triage Standing Order; Chest Pain    Collection Time: 05/30/23  7:33 PM   Result Value Ref Range    QT Interval 398 ms    QTC Interval 456 ms   High Sensitivity Troponin T 2Hr    Collection Time: 05/30/23  7:42 PM    Specimen: Arm, Right; Blood   Result Value Ref Range    HS Troponin T 17 (H) <10 ng/L    Troponin T Delta 2 >=-4 - <+4 ng/L     I spent 33 minutes caring for Kaitlynn on this date of service. This time includes time spent by me in the following activities: preparing for the visit, reviewing tests, obtaining and/or reviewing a separately obtained history, performing a medically  appropriate examination and/or evaluation, counseling and educating the patient/family/caregiver, ordering medications, tests, or procedures, and documenting information in the medical record

## 2023-06-29 PROBLEM — E04.1 THYROID NODULE GREATER THAN OR EQUAL TO 1.5 CM IN DIAMETER INCIDENTALLY NOTED ON IMAGING STUDY: Status: ACTIVE | Noted: 2023-06-29

## 2023-07-24 ENCOUNTER — TELEPHONE (OUTPATIENT)
Dept: ORTHOPEDIC SURGERY | Facility: CLINIC | Age: 68
End: 2023-07-24
Payer: MEDICARE

## 2023-07-24 NOTE — TELEPHONE ENCOUNTER
Caller: VERO MILLER    Relationship to patient: SELF    Best call back number: [phone]    Chief complaint:  PAT[place] FOR SE[place]AL MONTHS AND ASKED IF SHE COULD GET A RIGHT KNEE INJECTION THIS WEEK.    Type of visit: F/U/ INJ    Requested date: THIS WEEK

## 2023-07-26 ENCOUNTER — CLINICAL SUPPORT (OUTPATIENT)
Dept: INTERNAL MEDICINE | Facility: CLINIC | Age: 68
End: 2023-07-26
Payer: MEDICARE

## 2023-07-26 ENCOUNTER — LAB (OUTPATIENT)
Dept: INTERNAL MEDICINE | Facility: CLINIC | Age: 68
End: 2023-07-26
Payer: MEDICARE

## 2023-07-26 DIAGNOSIS — E78.2 MIXED HYPERLIPIDEMIA: ICD-10-CM

## 2023-07-26 DIAGNOSIS — Z23 NEED FOR COVID-19 VACCINE: ICD-10-CM

## 2023-07-26 DIAGNOSIS — E78.2 MIXED HYPERLIPIDEMIA: Primary | ICD-10-CM

## 2023-07-26 PROCEDURE — 91312 COVID-19 (PFIZER) BIVALENT 12+YRS: CPT | Performed by: FAMILY MEDICINE

## 2023-07-26 PROCEDURE — 0124A PR ADM SARSCOV2 30MCG/0.3ML BST: CPT | Performed by: FAMILY MEDICINE

## 2023-11-16 ENCOUNTER — OFFICE VISIT (OUTPATIENT)
Dept: ORTHOPEDIC SURGERY | Facility: CLINIC | Age: 68
End: 2023-11-16
Payer: MEDICARE

## 2023-11-16 VITALS
HEIGHT: 67 IN | DIASTOLIC BLOOD PRESSURE: 78 MMHG | WEIGHT: 153.6 LBS | SYSTOLIC BLOOD PRESSURE: 142 MMHG | BODY MASS INDEX: 24.11 KG/M2

## 2023-11-16 DIAGNOSIS — M17.0 PRIMARY OSTEOARTHRITIS OF BOTH KNEES: ICD-10-CM

## 2023-11-16 DIAGNOSIS — M25.561 PAIN IN BOTH KNEES, UNSPECIFIED CHRONICITY: Primary | ICD-10-CM

## 2023-11-16 DIAGNOSIS — M25.562 PAIN IN BOTH KNEES, UNSPECIFIED CHRONICITY: Primary | ICD-10-CM

## 2023-11-16 RX ORDER — TRIAMCINOLONE ACETONIDE 40 MG/ML
40 INJECTION, SUSPENSION INTRA-ARTICULAR; INTRAMUSCULAR
Status: COMPLETED | OUTPATIENT
Start: 2023-11-16 | End: 2023-11-16

## 2023-11-16 RX ORDER — LIDOCAINE HYDROCHLORIDE 10 MG/ML
3 INJECTION, SOLUTION EPIDURAL; INFILTRATION; INTRACAUDAL; PERINEURAL
Status: COMPLETED | OUTPATIENT
Start: 2023-11-16 | End: 2023-11-16

## 2023-11-16 RX ADMIN — LIDOCAINE HYDROCHLORIDE 3 ML: 10 INJECTION, SOLUTION EPIDURAL; INFILTRATION; INTRACAUDAL; PERINEURAL at 12:09

## 2023-11-16 RX ADMIN — TRIAMCINOLONE ACETONIDE 40 MG: 40 INJECTION, SUSPENSION INTRA-ARTICULAR; INTRAMUSCULAR at 12:09

## 2023-11-16 NOTE — PROGRESS NOTES
Procedure   - Large Joint Arthrocentesis: L knee on 11/16/2023 12:09 PM  Indications: pain  Details: 21 G needle, anterolateral approach  Medications: 3 mL lidocaine PF 1% 1 %; 40 mg triamcinolone acetonide 40 MG/ML  Outcome: tolerated well, no immediate complications  Procedure, treatment alternatives, risks and benefits explained, specific risks discussed. Consent was given by the patient. Immediately prior to procedure a time out was called to verify the correct patient, procedure, equipment, support staff and site/side marked as required. Patient was prepped and draped in the usual sterile fashion.

## 2023-11-16 NOTE — PROGRESS NOTES
"    [facility] [place] [facility] Note        Subjective     CC: Follow-up (2.5 year follow-up: Bilateral knee pain)      HPI    Kaitlynn Le is a 68 y.o. female.  Patient is here today for follow-up of her right knee arthritis and also for new problem today regarding her left knee.  When I saw her initially for her right knee in March 2021, she was injected with corticosteroid and has gotten great relief until recently.  Has had a flareup of her pain.  Patient is also here for new Bromday regarding her left knee.  Left knee bothers her in the front of the knee and anterior laterally and radiates to her shin.  She has difficulty sleeping.  She says the pain is not constant.    Overall, patient's symptoms are as above.    ROS:    Constiutional:Pt denies fever, chills, nausea, or vomiting.  MSK:as above        Objective      Past Medical History  Past Medical History:   Diagnosis Date    Breast cancer     Cancer     breast    Elevated cholesterol     GERD (gastroesophageal reflux disease) 03/12/2019    Heart murmur June 2022    History of radiation therapy     Hx of radiation therapy     Hyperlipidemia     Kidney stones 04/13/2017    bilateral    Osteoarthritis of knee     Osteopenia of multiple sites 09/19/2018    Osteoporosis     Pes planus, flexible     Sleep apnea     Thyroid nodule 06/29/2023    right,    Tibialis posterior tendinitis     Urinary tract infection 07/2017    Valvular heart disease 07/06/2022    Varicose veins of legs     Vertigo      Social History     Socioeconomic History    Marital status:    Tobacco Use    Smoking status: Never    Smokeless tobacco: Never   Vaping Use    Vaping Use: Never used   Substance and Sexual Activity    Alcohol use: No    Drug use: No    Sexual activity: Yes     Partners: Male     Birth control/protection: Post-menopausal     Comment:           Physical Exam  /78   Ht 168.9 cm (66.5\")   Wt 69.7 kg (153 lb 9.6 oz)   BMI 24.42 kg/m² "     Body mass index is 24.42 kg/m².    Patient is well nourished and well developed.        Ortho Exam  Peripheral Vascular:    Upper Extremity:   Inspection:  Left--no cyanotic nail beds Right--no cyanotic nail beds   Bilateral:  Pink nail beds with brisk capillary refill   Palpation:  Bilateral radial pulse normal    Musculoskeletal:  Global Assessment:  Overall assessment of Lower Extremity Muscle Strength and Tone:  Left quadriceps--5/5   Left hamstrings--5/5       Left tibialis anterior--5/5  Left gastroc-soleus--5/5  Left EHL--5/5    Right quadriceps--5/5  Right hamstrings--5/5  Right tibialis anterior--5/5  Right gastroc soleus--5/5  Right EHL--5/5    Lower Extremity:  Knee/Patella:  No digital clubbing or cyanosis.    Examination of left and right knees reveals:  Normal deep tendon reflexes, coordination, strength, tone, sensation.  No known fractures or deformities.    Inspection and Palpation:    Left knee:  Tenderness:  Over the medial joint line and moderate severity  Effusion:  1+  Crepitus:  Positive  Pulses:  2+  Ecchymosis:  None  Warmth:  None     Right knee:  Tenderness:  Over the lateral joint line and moderate severity  Effusion:  1+  Crepitus:  Positive  Pulses:  2+  Ecchymosis:  None  Warmth:  None     ROM:  Right:  Extension:5    Flexion:120  Left:  Extension:5     Flexion:120    Instability:    Left:  Lachman Test:  Negative, Varus stress test negative, Valgus stress test negative  Right:  Lachman Test:  Negative, Varus stress test negative, Valgus stress test negative    Deformities/Malalignments/Discrepancies:    Left: Neutral  Right: Neutral    Functional Testing:  Right:  Obed's test:  Negative  Patella grind test:  Positive  Q-angle:  Normal    Left:  Obed's test:  Negative  Patella grind test:  Positive  Q-angle:  normal      Imaging/Labs/EMG Reviewed:  Imaging Results (Last 24 Hours)       Procedure Component Value Units Date/Time    XR Knee 4+ View Bilateral [[id number]]  Resulted: 11/16/23 1248     Updated: 11/16/23 1250    Narrative:      Right knee X-Ray    Indication: Pain    Study:  Upright AP, Skiers, Lateral, and Sunrise views of Right knee(s)    Comparison: Right knee 3/25/2021    Findings:    Patient appears to have mild degenerative changes in the medial   compartment.    There are moderate degenerative changes in the lateral compartment.    There are moderate changes in the patellofemoral compartment.    Patient has overall varus alignment.    Kellgren-Jaswinder thGthrthathdtheth:th th4th Impression:   Moderate lateral compartment and patellofemoral compartment degnerative   changes of the right knee           Left knee X-Ray    Indication: Pain    Study:  Upright AP, Skiers, Lateral, and Sunrise views of Left knee(s)    Comparison: Left knee 7/2/2019    Findings:    Patient appears to have moderate degenerative changes in the medial   compartment.    There are mild degenerative changes in the lateral compartment.    There are moderate changes in the patellofemoral compartment.    Patient has overall varus alignment.    Kellgren-Jaswinder thGthrthathdtheth:th th4th Impression:   Moderate medial compartment and patellofemoral compartment degnerative   changes of the left knee                 Assessment    Assessment:  1. Pain in both knees, unspecified chronicity    2. Primary osteoarthritis of both knees        Plan:  Recommend over the counter anti-inflammatories for pain and/or swelling  Bilateral knee arthritis--right side we will get a repeat steroid injection.  Given how well she is done with the steroid on the right side, I have suggested a similar approach on the left side and we will see how she does.  She will call back if she seeks further treatment but again if she is not a lot better or does not get great relief, other modalities may be an option.      Procedure Note:    I discussed with the patient the potential benefits of performing a therapeutic injections as well as potential risks  including but not limited to infection, swelling, pain, bleeding, bruising, nerve/vessel damage, skin color changes, transient elevation in blood glucose levels, and fat atrophy. After informed consent and after the areas were prepped with chlorhexadine soap, ethyl chloride was used to numb the skin. Via the anterolateral approach, 3mL of 1% lidocaine followed by 40mg of Kenalog were each injected into the right and left knee joint. The patient tolerated the procedure well. There were no complications. A sterile dressing was placed over the injection sites.        Harsh Moreno MD  11/16/23  16:59 EST      Dictated Utilizing Dragon Dictation.

## 2024-02-09 NOTE — PROGRESS NOTES
OFFICE VISIT  NOTE  [facility] CARDIOLOGY      Name: Kaitlynn Le    Date: 2022  MRN:  [id number]  :  1955      REFERRING/PRIMARY PROVIDER:  Adela Valdez MD    Chief Complaint   Patient presents with   • Valvular heart disease   • WALKER (dyspnea on exertion)       HPI: Kaitlynn Le is a 66 y.o. female who presents today for new consultation for dyspnea on exertion and valvular heart disease.  She has a strong family history of mitral valve prolapse, father brother and sister all had mitral valve replacement surgeries.  She had dyspnea on exertion worsening in the spring, got echo 2022 which showed mild mitral valve prolapse with mild mitral regurgitation, EF 70%, grade 1 diastolic dysfunction mild LVH.  Stress test same time, low risk, carotid duplex bilateral 0-49%.  Shortness of breath has improved.    Past Medical History:   Diagnosis Date   • Cancer (HCC)     breast   • Elevated cholesterol    • GERD (gastroesophageal reflux disease) 3/12/2019   • History of radiation therapy    • Hyperlipidemia    • Kidney stones 2017    bilateral   • Osteoarthritis of knee    • Osteopenia of multiple sites 2018   • Osteoporosis    • Pes planus, flexible    • Sleep apnea    • Tibialis posterior tendinitis    • Urinary tract infection 2017   • Varicose veins of legs    • Vertigo        Past Surgical History:   Procedure Laterality Date   • BREAST RECONSTRUCTION Bilateral    • COLONOSCOPY     • COLONOSCOPY  2021    Dr Manning   • CYSTOSCOPY  2017    right ureteral stone Dr Lofton   • CYSTOSCOPY  2009   • CYSTOSCOPY INSERTION / REMOVAL STENT / STONE  2009    calcium oxxylate mono   • EXTRACORPOREAL SHOCK WAVE LITHOTRIPSY (ESWL) Left 2009   • EXTRACORPOREAL SHOCK WAVE LITHOTRIPSY (ESWL) Bilateral 2021    Procedure: EXTRACORPOREAL SHOCKWAVE LITHOTRIPSY BILATERAL;  Surgeon: Jayy Lofton Jr., MD;  [facility]: [facility];   Service: Urology;  Laterality: Bilateral;   • HERNIA REPAIR     • KNEE ARTHROSCOPY W/ MENISCAL REPAIR Right 2011   • MASTECTOMY MODIFIED RADICAL / SIMPLE / COMPLETE Left 2013    breast cancer   • SIMPLE MASTECTOMY Right 2013    hx breast Ca left   • SKIN BIOPSY  07/2017    mid thoracic back Dr Salinas   • TONSILLECTOMY  09/2015   • UPPER GASTROINTESTINAL ENDOSCOPY  06/20/2022    Hugh   • WISDOM TOOTH EXTRACTION  2010       Social History     Socioeconomic History   • Marital status:    Tobacco Use   • Smoking status: Never Smoker   • Smokeless tobacco: Never Used   Vaping Use   • Vaping Use: Never used   Substance and Sexual Activity   • Alcohol use: No   • Drug use: No   • Sexual activity: Yes     Partners: Male     Birth control/protection: Post-menopausal     Comment:        Family History   Problem Relation Age of Onset   • Breast cancer Mother    • Mitral valve prolapse Mother    • Heart failure Mother    • Heart disease Mother         Congestive heart failure   • Mitral valve prolapse Father    • Heart disease Father         Mitral valve replacement  1984   • Valvular heart disease Father         mitral valve replaced   • Prostate cancer Father    • Migraines Sister    • Mitral valve prolapse Sister    • Valvular heart disease Sister         mitral valve replaced   • Prostate cancer Brother    • Mitral valve prolapse Brother    • Heart disease Brother         Mitral valve replacement  2005   • Valvular heart disease Brother         mitral valve replaced   • Cancer Maternal Grandmother         liver   • Heart attack Maternal Grandfather    • No Known Problems Paternal Grandmother    • No Known Problems Paternal Grandfather    • Cancer Other    • Colon cancer Neg Hx    • Colon polyps Neg Hx         ROS:   Constitutional no fever,  no weight loss   Skin no rash, no subcutaneous nodules   Otolaryngeal no difficulty swallowing   Cardiovascular See HPI   Pulmonary no cough, no sputum production  "  Gastrointestinal no constipation, no diarrhea   Genitourinary no dysuria, no hematuria   Hematologic no easy bruisability, no abnormal bleeding   Musculoskeletal no muscle pain   Neurologic no dizziness, no falls         Allergies   Allergen Reactions   • Caffeine Other (See Comments)     Headaches, speeds up heart rate, gets acne with chocolate, keeps her from sleeping   • Ibuprofen GI Intolerance   • Adhesive Tape Rash     Paper tape         Current Outpatient Medications:   •  Calcium Citrate-Vitamin D (CALCIUM CITRATE + D PO), Take 2 tablets by mouth 2 (Two) Times a Day., Disp: , Rfl:   •  exemestane (AROMASIN) 25 MG chemo tablet, Take 1 tablet by mouth Daily., Disp: , Rfl:   •  hydrocortisone 2.5 % cream, Apply 1 application topically to the appropriate area as directed As Needed., Disp: , Rfl:   •  melatonin 3 MG tablet, Take 3 mg by mouth At Night As Needed for Sleep. 2 TABS AT NIGHT, Disp: , Rfl:   •  metroNIDAZOLE (METROCREAM) 0.75 % cream, Apply 1 application topically to the appropriate area as directed As Needed., Disp: , Rfl:   •  nystatin (MYCOSTATIN) 897931 UNIT/GM cream, Apply 1 application topically to the appropriate area as directed As Needed., Disp: , Rfl:   •  Probiotic Product (Align) capsule, Take 1 capsule by mouth Daily., Disp: , Rfl:   •  traZODone (DESYREL) 150 MG tablet, Take 150 mg by mouth Every Night. 0.5 tab DAILY PRN, Disp: , Rfl:   •  vitamin B-12 (CYANOCOBALAMIN) 500 MCG tablet, Take 500 mcg by mouth 3 (Three) Times a Week., Disp: , Rfl:     Vitals:    07/07/22 0846   BP: 122/64   BP Location: Right arm   Patient Position: Sitting   Pulse: 71   SpO2: 98%   Weight: 65 kg (143 lb 6.4 oz)   Height: 171.5 cm (67.5\")     Body mass index is 22.13 kg/m².    PHYSICAL EXAM:    General Appearance:   · well developed  · well nourished  HENT:   · oropharynx moist  · lips not cyanotic  Neck:  · thyroid not enlarged  · supple  Respiratory:  · no respiratory distress  · normal breath " sounds  · no rales  Cardiovascular:  · no jugular venous distention  · regular rhythm  · apical impulse normal  · S1 normal, S2 normal  · no S3, no S4   · no murmur  · no rub, no thrill  · carotid pulses normal; no bruit  · lower extremity edema: none      Musculoskeletal:  · no clubbing of fingers.   · normocephalic, head atraumatic  Skin:   · warm, dry  Psychiatric:  · judgement and insight appropriate  · normal mood and affect    RESULTS:     ECG 12 Lead    Date/Time: 7/7/2022 9:08 AM  Performed by: Arnold Blount MD  Authorized by: Arnold Blount MD   Comparison: not compared with previous ECG   Previous ECG: no previous ECG available  Rhythm: sinus rhythm  Rate: normal  QRS axis: normal    Clinical impression: abnormal EKG  Comments: Possible left atrial lodgment.            Results for orders placed during the hospital encounter of 05/09/22    Adult Transthoracic Echo Complete W/ Cont if Necessary Per Protocol    Interpretation Summary  · Left ventricular wall thickness is consistent with mild concentric hypertrophy.  · There is calcification of the aortic valve mainly affecting the non-coronary cusp(s) consistent with mild-moderate aortic valvular sclerosis.  · Estimated right ventricular systolic pressure from tricuspid regurgitation is normal (<35 mmHg).  · Mild dilation of the aortic root and of the sinuses of Valsalva present.  · Estimated left ventricular EF = 72% Left ventricular ejection fraction appears to be greater than 70%. Left ventricular systolic function is hyperdynamic (EF > 70%).  · Left ventricular diastolic function is consistent with (grade I) impaired relaxation.  · There is mild mitral valve prolapse of the anterior mitral leaflet.  · Normal right ventricular cavity size, wall thickness, systolic function and septal motion noted.  · No evidence of pulmonary hypertension is present.  · There is no evidence of pericardial effusion.  · Technically difficult and limited  study.        Labs:  Lab Results   Component Value Date    CHOL 260 (H) 03/31/2022    TRIG 42 03/31/2022    HDL 96 (H) 03/31/2022     (H) 03/31/2022    AST 19 03/31/2022    ALT 14 03/31/2022     Lab Results   Component Value Date    HGBA1C 5.30 09/13/2017     No components found for: CREATINININE  eGFR Non  Amer   Date Value Ref Range Status   05/25/2021 64 >60 mL/min/1.73 Final   12/01/2020 69 >60 mL/min/1.73 Final   07/09/2020 66 >60 mL/min/1.73 Final       Most recent PCP note, imaging tests, and labs reviewed.  I personally reviewed her echocardiogram, shows mild mitral regurgitation with very mild mitral valve prolapse, also reviewed her stress test report prior PCP and prior cardiology notes.    ASSESSMENT:  Problem List Items Addressed This Visit        Cardiac and Vasculature    Hyperlipidemia    WALKER (dyspnea on exertion) - Primary    Overview     5/9/22 Stress: Low risk, no evidence of ischemia                Valvular heart disease    Overview     5/9/22 Echo: EF 72%, calcification of the aortic valve consistent with mild to moderate aortic valvular sclerosis  · Left ventricular wall thickness is consistent with mild concentric hypertrophy.  · There is calcification of the aortic valve mainly affecting the non-coronary cusp(s) consistent with mild-moderate aortic valvular sclerosis.  · Estimated right ventricular systolic pressure from tricuspid regurgitation is normal (<35 mmHg).               Relevant Orders    Adult Transthoracic Echo Complete W/ Cont if Necessary Per Protocol    Carotid bruit    Overview     5/9/22 Carotid duplex: 0-49%, vertebral artery flow antegrade bilaterally                     PLAN:    1.  Mitral valve prolapse with mild mitral regurgitation:  Repeat echo in 1 year  Dyspnea improved  Continue exercise    2.  Bilateral carotid stenosis:  0 to 49% on carotid duplex 5/2022  She wants to avoid statins, however I would recommend a statin given elevated cholesterol and  mild plaque on carotid duplex.  She will think about it.    3.  Hyperlipidemia:  Recent lipids March 2022 reviewed, markedly elevated, goal LDL less than 130, goal total cholesterol less than 200  I recommend atorvastatin 20 mg daily, she wants to avoid statins, work on diet for now.  Repeat lipids in 2 to 3 months    Advance Care Planning   ACP discussion was held with the patient during this visit. Patient does not have an advance directive, declines further assistance.         Return to clinic in 12 months, or sooner as needed.    Thank you for the opportunity to share in the care of your patient; please do not hesitate to call me with any questions.     Arnold Blount MD, FACC  Office: [phone] [address] [place] [address]    07/07/22     Positive

## 2024-02-12 ENCOUNTER — TELEPHONE (OUTPATIENT)
Dept: INTERNAL MEDICINE | Facility: CLINIC | Age: 69
End: 2024-02-12

## 2024-02-12 NOTE — TELEPHONE ENCOUNTER
Caller: Kaitlynn Le    Relationship to patient: Self    Best call back number: [phone]    Patient is needing: PATIENT RECEIVED A LETTER STATING THAT SHE HAD NOT FOLLOWED UP AND GOTTEN FASTING LABS COMPLETED. PATIENT DECIDED TO TAKE CARE OF HER CHOLESTEROL BY CHANGING HER DIET AND DECIDED AGAINST TAKING MEDICATION. PATIENT IS ALSO LIVING TEMPORARILY IN [place] AND WILL NOT BE ABLE TO GET LABS DONE FOR A FEW MONTHS.

## 2024-05-04 NOTE — PROGRESS NOTES
Subjective   Kaitlynn Le is a 63 y.o. female    Chief Complaint   Patient presents with   • Urinary Tract Infection     urinary urgency and frequency.      Urinary Tract Infection    This is a new problem. The current episode started in the past 7 days. The problem occurs intermittently. The problem has been waxing and waning. The quality of the pain is described as burning. The pain is mild. There has been no fever. She is not sexually active. There is no history of pyelonephritis. Associated symptoms include frequency and urgency. Pertinent negatives include no chills, discharge, flank pain, hematuria, hesitancy, nausea, possible pregnancy, sweats or vomiting. She has tried nothing for the symptoms. The treatment provided no relief. There is no history of catheterization, kidney stones, recurrent UTIs, a single kidney, urinary stasis or a urological procedure.        The following portions of the patient's history were reviewed and updated as appropriate: allergies, current medications, past family history, past medical history, past social history, past surgical history and problem list.    Current Outpatient Medications:   •  aspirin 81 MG chewable tablet, Chew 81 mg Daily., Disp: , Rfl:   •  Calcium Citrate-Vitamin D (CALCIUM CITRATE + D PO), Take  by mouth., Disp: , Rfl:   •  ciprofloxacin (CIPRO) 500 MG tablet, Take 1 tablet by mouth 2 (Two) Times a Day., Disp: 14 tablet, Rfl: 0  •  exemestane (AROMASIN) 25 MG chemo tablet, 1 tablet Daily., Disp: , Rfl:   •  metroNIDAZOLE (METROCREAM) 0.75 % cream, As Needed., Disp: , Rfl:   •  nystatin (MYCOSTATIN) 908531 UNIT/GM cream, As Needed., Disp: , Rfl:   •  phenazopyridine (PYRIDIUM) 100 MG tablet, Take 1 tablet by mouth 3 (Three) Times a Day As Needed for bladder spasms., Disp: 15 tablet, Rfl: 0  •  rOPINIRole (REQUIP) 0.25 MG tablet, 1-2 po qhs, Disp: 60 tablet, Rfl: 3  •  traZODone (DESYREL) 50 MG tablet, Take 1 tablet by mouth Every Night., Disp: 30 tablet,  "Rfl: 3  •  triamcinolone (KENALOG) 0.1 % cream, As Needed., Disp: , Rfl:   •  vitamin B-12 (CYANOCOBALAMIN) 500 MCG tablet, Take 500 mcg by mouth Daily., Disp: , Rfl:      Review of Systems   Constitutional: Negative for chills, fatigue and fever.   Respiratory: Negative for cough, chest tightness and shortness of breath.    Cardiovascular: Negative for chest pain.   Gastrointestinal: Negative for abdominal pain, diarrhea, nausea and vomiting.   Endocrine: Negative for cold intolerance and heat intolerance.   Genitourinary: Positive for frequency and urgency. Negative for flank pain, hematuria and hesitancy.   Musculoskeletal: Negative for arthralgias.   Neurological: Negative for dizziness.       Objective   Physical Exam   Constitutional: She is oriented to person, place, and time. She appears well-developed and well-nourished.   HENT:   Head: Normocephalic and atraumatic.   Eyes: Conjunctivae and EOM are normal. Pupils are equal, round, and reactive to light.   Neck: Normal range of motion.   Cardiovascular: Normal rate, regular rhythm and normal heart sounds.   Pulmonary/Chest: Effort normal and breath sounds normal.   Abdominal: Soft. Bowel sounds are normal.   Musculoskeletal: Normal range of motion.   Neurological: She is alert and oriented to person, place, and time. She has normal reflexes.   Skin: Skin is warm and dry.   Psychiatric: She has a normal mood and affect. Her behavior is normal. Judgment and thought content normal.     Vitals:    03/25/19 1618   BP: 138/62   Pulse: 70   Resp: 16   Temp: 97.8 °F (36.6 °C)   TempSrc: Temporal   SpO2: 99%   Weight: 70.6 kg (155 lb 9.6 oz)   Height: 170.2 cm (67.01\")         Assessment/Plan   Kaitlynn was seen today for urinary tract infection.    Diagnoses and all orders for this visit:    Acute cystitis with hematuria  -     POC Urinalysis Dipstick, Automated  -     Cancel: Urine Culture - Urine, Urine, Clean Catch  -     Urine Culture - Urine, Urine, Clean Catch; " Future  -     CBC & Differential; Future  -     Urine Culture - Urine, Urine, Clean Catch  -     CBC & Differential  -     CBC Auto Differential    Other orders  -     ciprofloxacin (CIPRO) 500 MG tablet; Take 1 tablet by mouth 2 (Two) Times a Day.  -     phenazopyridine (PYRIDIUM) 100 MG tablet; Take 1 tablet by mouth 3 (Three) Times a Day As Needed for bladder spasms.      Meds as directed  Increase fluids  Urine sent for culture  RTC if sx's worsen or do not improve            [de-identified] : EDWIN JALLOH Was seen in follow-up on April 30.  She is not having any further bleeding.  She notes some left-sided discomfort and comes in for repeat evaluation.  She has the chronic ozenoid changes on the left but has not really been using the rinse recently.  The patient had no other ear nose or throat complaints at this visit.

## 2024-06-03 ENCOUNTER — TRANSCRIBE ORDERS (OUTPATIENT)
Dept: ADMINISTRATIVE | Facility: HOSPITAL | Age: 69
End: 2024-06-03
Payer: MEDICARE

## 2024-06-03 DIAGNOSIS — E04.1 NONTOXIC UNINODULAR GOITER: Primary | ICD-10-CM

## 2024-07-24 ENCOUNTER — OFFICE VISIT (OUTPATIENT)
Dept: CARDIOLOGY | Facility: CLINIC | Age: 69
End: 2024-07-24
Payer: MEDICARE

## 2024-07-24 VITALS
OXYGEN SATURATION: 96 % | DIASTOLIC BLOOD PRESSURE: 62 MMHG | SYSTOLIC BLOOD PRESSURE: 90 MMHG | HEART RATE: 99 BPM | WEIGHT: 155.2 LBS | HEIGHT: 67 IN | BODY MASS INDEX: 24.36 KG/M2

## 2024-07-24 DIAGNOSIS — I38 VALVULAR HEART DISEASE: Primary | ICD-10-CM

## 2024-07-24 DIAGNOSIS — R06.09 DOE (DYSPNEA ON EXERTION): ICD-10-CM

## 2024-07-24 DIAGNOSIS — I83.813 VARICOSE VEINS OF BOTH LOWER EXTREMITIES WITH PAIN: ICD-10-CM

## 2024-07-24 DIAGNOSIS — E78.2 MIXED HYPERLIPIDEMIA: ICD-10-CM

## 2024-07-24 PROCEDURE — 1159F MED LIST DOCD IN RCRD: CPT | Performed by: INTERNAL MEDICINE

## 2024-07-24 PROCEDURE — 1160F RVW MEDS BY RX/DR IN RCRD: CPT | Performed by: INTERNAL MEDICINE

## 2024-07-24 PROCEDURE — 99213 OFFICE O/P EST LOW 20 MIN: CPT | Performed by: INTERNAL MEDICINE

## 2024-07-24 NOTE — PROGRESS NOTES
OFFICE VISIT  NOTE  [facility] CARDIOLOGY      Name: Kaitlynn Le    Date: 2024  MRN:  [id number]  :  1955      REFERRING/PRIMARY PROVIDER:  Adela Valdez MD     Chief Complaint   Patient presents with    Valvular heart disease     HPI: Kaitlynn Le is a 69 y.o. female who presents for dyspnea on exertion and valvular heart disease.  She has a strong family history of mitral valve prolapse, father brother and sister all had mitral valve replacement surgeries.  She had dyspnea on exertion worsening in the spring, got echo 2022 which showed mild mitral valve prolapse with mild mitral regurgitation, EF 70%, grade 1 diastolic dysfunction mild LVH.  Stress test same time, low risk, carotid duplex bilateral 0-49%.  Doing very well, no change in breathing, exercising by walking and doing Silver sneakers.  Denies chest pain or significant shortness of breath.    ROS:Pertinent positives as listed in the HPI.  All other systems reviewed and negative.    Past Medical History:   Diagnosis Date    Breast cancer     Cancer     breast    Elevated cholesterol     GERD (gastroesophageal reflux disease) 2019    Heart murmur 2022    History of radiation therapy     Hx of radiation therapy     Hyperlipidemia     Kidney stones 2017    bilateral    Osteoarthritis of knee     Osteopenia of multiple sites 2018    Osteoporosis     Pes planus, flexible     Sleep apnea     Thyroid nodule 2023    right,    Tibialis posterior tendinitis     Urinary tract infection 2017    Valvular heart disease 2022    Varicose veins of legs     Vertigo        Past Surgical History:   Procedure Laterality Date    BREAST RECONSTRUCTION Bilateral     COLONOSCOPY  [id number]    COLONOSCOPY  2021    Dr Manning    CYSTOSCOPY  2017    right ureteral stone Dr Lofton    CYSTOSCOPY  2009    CYSTOSCOPY INSERTION / REMOVAL STENT / STONE  2009     calcium oxxylate mono    EXTRACORPOREAL SHOCK WAVE LITHOTRIPSY (ESWL) Left 03/2009    EXTRACORPOREAL SHOCK WAVE LITHOTRIPSY (ESWL) Bilateral 03/19/2021    Procedure: EXTRACORPOREAL SHOCKWAVE LITHOTRIPSY BILATERAL;  Surgeon: Jayy Lofton Jr., MD;  Location: [facility];  Service: Urology;  Laterality: Bilateral;    HERNIA REPAIR      KNEE ARTHROSCOPY W/ MENISCAL REPAIR Right 2011    KNEE SURGERY  2011    MASTECTOMY MODIFIED RADICAL / SIMPLE / COMPLETE Left 2013    breast cancer    SIMPLE MASTECTOMY Right 2013    hx breast Ca left    SKIN BIOPSY  07/2017    mid thoracic back Dr Salinas    TONSILLECTOMY  09/2015    UPPER GASTROINTESTINAL ENDOSCOPY  06/20/2022    Hugh    WISDOM TOOTH EXTRACTION  2010       Social History     Socioeconomic History    Marital status:    Tobacco Use    Smoking status: Never    Smokeless tobacco: Never   Vaping Use    Vaping status: Never Used   Substance and Sexual Activity    Alcohol use: No    Drug use: No    Sexual activity: Yes     Partners: Male     Birth control/protection: Post-menopausal     Comment:        Family History   Problem Relation Age of Onset    Breast cancer Mother     Mitral valve prolapse Mother     Heart failure Mother         Congestive heart failure    Heart disease Mother         Congestive heart failure    Cancer Mother         Breast    Mitral valve prolapse Father     Heart disease Father         Mitral valve replacement  1984    Valvular heart disease Father         mitral valve replaced    Prostate cancer Father     Cancer Father         Prostate    Heart failure Father         Congestive heart failure    Migraines Sister     Mitral valve prolapse Sister     Valvular heart disease Sister         mitral valve replaced    Prostate cancer Brother     Mitral valve prolapse Brother     Heart disease Brother         Mitral valve replacement  2005    Valvular heart disease Brother         mitral valve replaced    Cancer Maternal  "Grandmother         Liver    Heart attack Maternal Grandfather     Mental illness Paternal Grandmother     No Known Problems Paternal Grandfather     Cancer Other     Heart disease Sister         Mitral valve replacement  2020    Colon cancer Neg Hx     Colon polyps Neg Hx         Allergies   Allergen Reactions    Caffeine Other (See Comments)     Headaches, speeds up heart rate, gets acne with chocolate, keeps her from sleeping    Ibuprofen GI Intolerance    Lipitor [Atorvastatin] Myalgia    Adhesive Tape Rash     Paper tape       Current Outpatient Medications   Medication Instructions    Calcium Citrate-Vitamin D (CALCIUM CITRATE + D PO) 2 tablets, Oral, 2 Times Daily    metroNIDAZOLE (METROCREAM) 0.75 % cream 1 application , Topical, As Needed    nystatin (MYCOSTATIN) 072519 UNIT/GM cream 1 application , Topical, As Needed    Probiotic Product (Align) capsule 1 capsule, Oral, Daily    vitamin B-12 (CYANOCOBALAMIN) 500 mcg, Oral, 3 Times Weekly       Vitals:    07/24/24 0903   BP: 90/62   BP Location: Right arm   Patient Position: Sitting   Cuff Size: Adult   Pulse: 99   SpO2: 96%   Weight: 70.4 kg (155 lb 3.2 oz)   Height: 170.2 cm (67\")       Body mass index is 24.31 kg/m².    PHYSICAL EXAM:    General Appearance:   well developed  well nourished  Neck:  thyroid not enlarged  supple  Respiratory:  no respiratory distress  normal breath sounds  no rales  Cardiovascular:  no jugular venous distention  regular rhythm  apical impulse normal  S1 normal, S2 normal  no S3, no S4   no murmur  no rub, no thrill  carotid pulses normal; no bruit  pedal pulses normal  lower extremity edema: none    Skin:   warm, dry    RESULTS:   Procedures    Results for orders placed during the hospital encounter of 07/20/23    Adult Transthoracic Echo Complete W/ Cont if Necessary Per Protocol    Interpretation Summary    Left ventricular systolic function is normal. Calculated left ventricular EF = 58% Left ventricular ejection " fraction appears to be 56 - 60%.    Left ventricular diastolic function was normal.    There is mild calcification of the aortic valve.    There is mild mitral valve prolapse of the anterior mitral leaflet, with trace/mild mitral valve regurgitation.    Estimated right ventricular systolic pressure from tricuspid regurgitation is normal (<35 mmHg). Calculated right ventricular systolic pressure from tricuspid regurgitation is 7 mmHg.    Stable/improved from previous study.    Labs:  Lab Results   Component Value Date    CHOL 200 05/10/2023    TRIG 36 05/10/2023    HDL 96 (H) 05/10/2023    LDL 97 05/10/2023    AST 23 05/30/2023    ALT 21 05/30/2023     Lab Results   Component Value Date    HGBA1C 5.30 09/13/2017     Creatinine   Date Value Ref Range Status   05/30/2023 0.81 0.57 - 1.00 mg/dL Final   05/10/2023 0.92 0.57 - 1.00 mg/dL Final   03/31/2022 0.94 0.57 - 1.00 mg/dL Final     eGFR Non  Amer   Date Value Ref Range Status   05/25/2021 64 >60 mL/min/1.73 Final   12/01/2020 69 >60 mL/min/1.73 Final   07/09/2020 66 >60 mL/min/1.73 Final       ASSESSMENT:  Problem List Items Addressed This Visit       Hyperlipidemia    Varicose veins of legs    WALKER (dyspnea on exertion)    Overview     5/9/22 Stress: Low risk, no evidence of ischemia              Valvular heart disease - Primary    Overview     5/9/22 Echo: EF 72%, calcification of the aortic valve consistent with mild to moderate aortic valvular sclerosis  Left ventricular wall thickness is consistent with mild concentric hypertrophy.  There is calcification of the aortic valve mainly affecting the non-coronary cusp(s) consistent with mild-moderate aortic valvular sclerosis.  Estimated right ventricular systolic pressure from tricuspid regurgitation is normal (<35 mmHg).                PLAN:  1.  Mitral valve prolapse with mild mitral regurgitation:  Last echo 5/2023 reviewed, stable, no need for repeat echo unless symptoms occur.  Dyspnea  improved  Continue exercise     2.  Bilateral carotid stenosis:  0 to 49% on carotid duplex 5/2022  Continue low-dose aspirin  Tried atorvastatin but cannot tolerate due to muscle weakness.  We offered Zetia last time but she did not take it due to perceived side effects.     3.  Hyperlipidemia:  Cannot tolerate atorvastatin, has prescription for Zetia  If she cannot tolerate it I would recommend bempedoic acid  Repeat lipid panel 5/2023 with , LDL 97, but while she was on atorvastatin       Advance Care Planning   ACP discussion was held with the patient during this visit. Patient does not have an advance directive, declines further assistance.          Follow-up   Return if symptoms worsen or fail to improve.    Arnold Blount MD, FACC, [facility]  Interventional Cardiology

## 2025-06-17 ENCOUNTER — OFFICE VISIT (OUTPATIENT)
Dept: ORTHOPEDIC SURGERY | Facility: CLINIC | Age: 70
End: 2025-06-17
Payer: MEDICARE

## 2025-06-17 VITALS
BODY MASS INDEX: 25.39 KG/M2 | HEIGHT: 66 IN | SYSTOLIC BLOOD PRESSURE: 108 MMHG | WEIGHT: 158 LBS | DIASTOLIC BLOOD PRESSURE: 68 MMHG

## 2025-06-17 DIAGNOSIS — M17.0 PRIMARY OSTEOARTHRITIS OF BOTH KNEES: Primary | ICD-10-CM

## 2025-06-17 RX ORDER — TRIAMCINOLONE ACETONIDE 40 MG/ML
40 INJECTION, SUSPENSION INTRA-ARTICULAR; INTRAMUSCULAR
Status: COMPLETED | OUTPATIENT
Start: 2025-06-17 | End: 2025-06-17

## 2025-06-17 RX ORDER — LIDOCAINE HYDROCHLORIDE 10 MG/ML
3 INJECTION, SOLUTION EPIDURAL; INFILTRATION; INTRACAUDAL; PERINEURAL
Status: COMPLETED | OUTPATIENT
Start: 2025-06-17 | End: 2025-06-17

## 2025-06-17 RX ORDER — UBIDECARENONE 75 MG
CAPSULE ORAL
COMMUNITY

## 2025-06-17 RX ORDER — EZETIMIBE 10 MG/1
10 TABLET ORAL
COMMUNITY
Start: 2025-04-14

## 2025-06-17 RX ADMIN — LIDOCAINE HYDROCHLORIDE 3 ML: 10 INJECTION, SOLUTION EPIDURAL; INFILTRATION; INTRACAUDAL; PERINEURAL at 14:18

## 2025-06-17 RX ADMIN — TRIAMCINOLONE ACETONIDE 40 MG: 40 INJECTION, SUSPENSION INTRA-ARTICULAR; INTRAMUSCULAR at 14:18

## 2025-06-17 NOTE — PROGRESS NOTES
[facility] Note        Subjective     CC: Follow-up (7 month follow-up: Primary osteoarthritis of both knees/)      HPI    Kaitlynn Le is a 69 y.o. female.  Patient seeing me for the first time since November 2023 for right greater than left knee pain.  She says that both knees give way periodically.  Pain is worse in the right knee than left knee.  The right side hurts laterally and the left side hurts in a nonfocal manner.  She has been a [employer] for the last 16 years and was in [place] few months ago and fell and injured her left hip requiring physical therapy.  The physical therapy exercises that she has been doing have really aggravated her knee pain.  She has had gel in the past and one of her knees after a meniscal surgery in 2011.  She has done well with gel in the past.  She has done well with the steroid injections we have given her as well.    Overall, patient's symptoms are as above    ROS:    Constiutional:Pt denies fever, chills, nausea, or vomiting.  MSK:as above        Objective      Past Medical History  Past Medical History:   Diagnosis Date    Breast cancer     Cancer     breast    Elevated cholesterol     GERD (gastroesophageal reflux disease) 03/12/2019    Heart murmur June 2022    History of radiation therapy     Hx of radiation therapy     Hyperlipidemia     Kidney stones 04/13/2017    bilateral    Osteoarthritis of knee     Osteopenia of multiple sites 09/19/2018    Osteoporosis     Pes planus, flexible     Sleep apnea     Sleep apnea, obstructive July 2018    Thyroid nodule 06/29/2023    right,    Tibialis posterior tendinitis     Urinary tract infection 07/2017    Valvular heart disease 07/06/2022    Varicella 1960    Varicose veins of legs     Vertigo      Social History     Socioeconomic History    Marital status:    Tobacco Use    Smoking status: Never    Smokeless tobacco: Never   Vaping Use    Vaping status: Never Used   Substance and  "Sexual Activity    Alcohol use: No    Drug use: No    Sexual activity: Yes     Partners: Male     Birth control/protection: Post-menopausal     Comment:           Physical Exam  /68   Ht 167.6 cm (66\")   Wt 71.7 kg (158 lb)   BMI 25.50 kg/m²     Body mass index is 25.5 kg/m².    Patient is well nourished and well developed.        Ortho Exam  Peripheral Vascular:    Upper Extremity:   Inspection:  Left--no cyanotic nail beds Right--no cyanotic nail beds   Bilateral:  Pink nail beds with brisk capillary refill   Palpation:  Bilateral radial pulse normal    Musculoskeletal:  Global Assessment:  Overall assessment of Lower Extremity Muscle Strength and Tone:  Left quadriceps--5/5   Left hamstrings--5/5       Left tibialis anterior--5/5  Left gastroc-soleus--5/5  Left EHL--5/5    Right quadriceps--5/5  Right hamstrings--5/5  Right tibialis anterior--5/5  Right gastroc soleus--5/5  Right EHL--5/5    Lower Extremity:  Knee/Patella:  No digital clubbing or cyanosis.    Examination of left and right knees reveals:  Normal deep tendon reflexes, coordination, strength, tone, sensation.  No known fractures or deformities.    Inspection and Palpation:    Left knee:  Tenderness:  Over the medial joint line and mild severity  Effusion:  1+  Crepitus:  Positive  Pulses:  2+  Ecchymosis:  None  Warmth:  None     Right knee:  Tenderness:  Over the lateral joint line and moderate severity  Effusion:  1+  Crepitus:  Positive  Pulses:  2+  Ecchymosis:  None  Warmth:  None     ROM:  Right:  Extension:5    Flexion:120  Left:  Extension:5     Flexion:120    Instability:    Left:  Lachman Test:  Negative, Varus stress test negative, Valgus stress test negative  Right:  Lachman Test:  Negative, Varus stress test negative, Valgus stress test negative    Deformities/Malalignments/Discrepancies:    Left: Neutral  Right: Neutral    Functional Testing:  Right:  Obed's test:  Negative  Patella grind test:  Positive  Q-angle:  " Normal    Left:  Obed's test:  Negative  Patella grind test:  Positive  Q-angle:  normal      Imaging/Labs/EMG Reviewed and Interpreted:  Imaging Results (Last 24 Hours)       Procedure Component Value Units Date/Time    XR Knee 4+ View Bilateral [[id number]] Resulted: 06/17/25 1406     Updated: 06/17/25 1406    Narrative:      Knee X-Ray    Indication: Pain    Study:  Upright AP, Skiers, Lateral, and Sunrise views of Bilateral   knee(s)    Comparison: Bilateral knee 11/16/2023    Findings:    Right Knee:  Patient appears to have moderate degenerative changes in the   medial compartment.  There are severe degenerative changes in the lateral   compartment.  There are moderate changes in the patellofemoral   compartment.  Patient has overall valgus alignment.      Left Knee:  Patient appears to have severe degenerative changes in the   medial compartment.  There are mild degenerative changes in the lateral   compartment.  There are moderate changes in the patellofemoral   compartment.  Patient has overall varus alignment.     Impression:   Right Knee: Severe lateral and moderate patellofemoral compartment   osteoarthritis, genu valgum.  Left Knee: Severe medial and moderate patellofemoral compartment   osteoarthritis, genu varum.                   Assessment    Assessment:  1. Primary osteoarthritis of both knees        Plan:  Recommend over the counter anti-inflammatories for pain and/or swelling  Bilateral knee arthritis right greater than left--patient has windswept knees with end-stage lateral compartment arthritis in the right knee and moderate to severe medial compartment arthritis on the left.  I am going to recommend a corticosteroid injection for her acute exacerbation bilaterally.  I will see her back in 4 months and if she has not gotten great relief, consider initiating a course of viscosupplementation which will be ordered today.      Procedure Note:    I discussed with the patient the potential  benefits of performing a therapeutic injections as well as potential risks including but not limited to infection, swelling, pain, bleeding, bruising, nerve/vessel damage, skin color changes, transient elevation in blood glucose levels, elevated blood pressure and fat atrophy. After informed consent and after the areas were prepped with chlorhexadine soap, ethyl chloride was used to numb the skin. Via the anterolateral approach, 3mL of 1% lidocaine followed by 40mg of Kenalog were each injected into the right and left knee joint. The patient tolerated the procedure well. There were no complications. A sterile dressing was placed over the injection sites.        Harsh Moreno MD  06/17/25  14:19 EDT      Dictated Utilizing Dragon Dictation.

## 2025-06-17 NOTE — PROGRESS NOTES
Procedure   - Large Joint Arthrocentesis: bilateral knee on 6/17/2025 2:18 PM  Indications: pain  Details: 21 G needle, anterolateral approach  Medications (Right): 3 mL lidocaine PF 1% 1 %; 40 mg triamcinolone acetonide 40 MG/ML  Medications (Left): 3 mL lidocaine PF 1% 1 %; 40 mg triamcinolone acetonide 40 MG/ML  Outcome: tolerated well, no immediate complications  Procedure, treatment alternatives, risks and benefits explained, specific risks discussed. Consent was given by the patient. Immediately prior to procedure a time out was called to verify the correct patient, procedure, equipment, support staff and site/side marked as required. Patient was prepped and draped in the usual sterile fashion.

## 2025-08-19 ENCOUNTER — OFFICE VISIT (OUTPATIENT)
Dept: ORTHOPEDIC SURGERY | Facility: CLINIC | Age: 70
End: 2025-08-19
Payer: MEDICARE

## 2025-08-19 VITALS
BODY MASS INDEX: 25.46 KG/M2 | DIASTOLIC BLOOD PRESSURE: 72 MMHG | WEIGHT: 158.4 LBS | SYSTOLIC BLOOD PRESSURE: 106 MMHG | HEIGHT: 66 IN

## 2025-08-19 DIAGNOSIS — M17.0 PRIMARY OSTEOARTHRITIS OF BOTH KNEES: Primary | ICD-10-CM

## 2025-08-19 DIAGNOSIS — M17.11 PRIMARY OSTEOARTHRITIS OF RIGHT KNEE: ICD-10-CM

## 2025-08-19 RX ORDER — LIDOCAINE HYDROCHLORIDE 10 MG/ML
3 INJECTION, SOLUTION EPIDURAL; INFILTRATION; INTRACAUDAL; PERINEURAL
Status: COMPLETED | OUTPATIENT
Start: 2025-08-19 | End: 2025-08-19

## 2025-08-19 RX ADMIN — LIDOCAINE HYDROCHLORIDE 3 ML: 10 INJECTION, SOLUTION EPIDURAL; INFILTRATION; INTRACAUDAL; PERINEURAL at 09:28

## 2025-08-27 ENCOUNTER — TELEPHONE (OUTPATIENT)
Dept: INTERNAL MEDICINE | Facility: CLINIC | Age: 70
End: 2025-08-27
Payer: COMMERCIAL